# Patient Record
Sex: MALE | Race: WHITE | NOT HISPANIC OR LATINO | URBAN - METROPOLITAN AREA
[De-identification: names, ages, dates, MRNs, and addresses within clinical notes are randomized per-mention and may not be internally consistent; named-entity substitution may affect disease eponyms.]

---

## 2017-09-29 ENCOUNTER — OFFICE (AMBULATORY)
Dept: URBAN - METROPOLITAN AREA CLINIC 64 | Facility: CLINIC | Age: 72
End: 2017-09-29

## 2017-09-29 ENCOUNTER — HOSPITAL ENCOUNTER (OUTPATIENT)
Dept: OTHER | Facility: HOSPITAL | Age: 72
Setting detail: SPECIMEN
Discharge: HOME | End: 2017-09-29
Attending: INTERNAL MEDICINE | Admitting: INTERNAL MEDICINE

## 2017-09-29 ENCOUNTER — ON CAMPUS - OUTPATIENT (AMBULATORY)
Dept: URBAN - METROPOLITAN AREA HOSPITAL 2 | Facility: HOSPITAL | Age: 72
End: 2017-09-29

## 2017-09-29 VITALS
DIASTOLIC BLOOD PRESSURE: 66 MMHG | SYSTOLIC BLOOD PRESSURE: 121 MMHG | RESPIRATION RATE: 18 BRPM | WEIGHT: 212 LBS | RESPIRATION RATE: 16 BRPM | OXYGEN SATURATION: 100 % | DIASTOLIC BLOOD PRESSURE: 85 MMHG | OXYGEN SATURATION: 98 % | HEART RATE: 64 BPM | SYSTOLIC BLOOD PRESSURE: 171 MMHG | SYSTOLIC BLOOD PRESSURE: 143 MMHG | SYSTOLIC BLOOD PRESSURE: 100 MMHG | SYSTOLIC BLOOD PRESSURE: 124 MMHG | OXYGEN SATURATION: 97 % | SYSTOLIC BLOOD PRESSURE: 112 MMHG | HEART RATE: 63 BPM | DIASTOLIC BLOOD PRESSURE: 74 MMHG | HEART RATE: 70 BPM | HEART RATE: 61 BPM | DIASTOLIC BLOOD PRESSURE: 67 MMHG | SYSTOLIC BLOOD PRESSURE: 111 MMHG | HEIGHT: 72 IN | DIASTOLIC BLOOD PRESSURE: 84 MMHG | SYSTOLIC BLOOD PRESSURE: 117 MMHG | OXYGEN SATURATION: 99 % | TEMPERATURE: 97.1 F | DIASTOLIC BLOOD PRESSURE: 62 MMHG | HEART RATE: 60 BPM | SYSTOLIC BLOOD PRESSURE: 158 MMHG | DIASTOLIC BLOOD PRESSURE: 76 MMHG | HEART RATE: 77 BPM

## 2017-09-29 DIAGNOSIS — K64.1 SECOND DEGREE HEMORRHOIDS: ICD-10-CM

## 2017-09-29 DIAGNOSIS — B96.81 HELICOBACTER PYLORI [H. PYLORI] AS THE CAUSE OF DISEASES CLA: ICD-10-CM

## 2017-09-29 DIAGNOSIS — D12.5 BENIGN NEOPLASM OF SIGMOID COLON: ICD-10-CM

## 2017-09-29 DIAGNOSIS — K29.50 UNSPECIFIED CHRONIC GASTRITIS WITHOUT BLEEDING: ICD-10-CM

## 2017-09-29 DIAGNOSIS — K21.9 GASTRO-ESOPHAGEAL REFLUX DISEASE WITHOUT ESOPHAGITIS: ICD-10-CM

## 2017-09-29 DIAGNOSIS — Z12.11 ENCOUNTER FOR SCREENING FOR MALIGNANT NEOPLASM OF COLON: ICD-10-CM

## 2017-09-29 DIAGNOSIS — K31.89 OTHER DISEASES OF STOMACH AND DUODENUM: ICD-10-CM

## 2017-09-29 DIAGNOSIS — K29.70 GASTRITIS, UNSPECIFIED, WITHOUT BLEEDING: ICD-10-CM

## 2017-09-29 LAB
GI HISTOLOGY: A. SELECT: (no result)
GI HISTOLOGY: B. UNSPECIFIED: (no result)
GI HISTOLOGY: PDF REPORT: (no result)

## 2017-09-29 PROCEDURE — 45385 COLONOSCOPY W/LESION REMOVAL: CPT | Mod: PT | Performed by: INTERNAL MEDICINE

## 2017-09-29 PROCEDURE — 43239 EGD BIOPSY SINGLE/MULTIPLE: CPT | Mod: 59 | Performed by: INTERNAL MEDICINE

## 2017-09-29 PROCEDURE — 88342 IMHCHEM/IMCYTCHM 1ST ANTB: CPT | Mod: 26 | Performed by: INTERNAL MEDICINE

## 2017-09-29 PROCEDURE — 88305 TISSUE EXAM BY PATHOLOGIST: CPT | Mod: 26 | Performed by: INTERNAL MEDICINE

## 2017-09-29 RX ORDER — PANTOPRAZOLE SODIUM 40 MG/1
40 TABLET, DELAYED RELEASE ORAL
Qty: 90 | Refills: 3 | Status: ACTIVE
Start: 2017-09-29

## 2017-09-29 RX ADMIN — PROPOFOL: 10 INJECTION, EMULSION INTRAVENOUS at 11:49

## 2018-01-12 ENCOUNTER — HOSPITAL ENCOUNTER (OUTPATIENT)
Dept: FAMILY MEDICINE CLINIC | Facility: CLINIC | Age: 73
Setting detail: SPECIMEN
Discharge: HOME | End: 2018-01-12
Attending: FAMILY MEDICINE | Admitting: FAMILY MEDICINE

## 2018-01-12 LAB
ALBUMIN SERPL-MCNC: 4.2 G/DL (ref 3.5–4.8)
ALBUMIN/GLOB SERPL: 1.7 {RATIO} (ref 1–1.7)
ALP SERPL-CCNC: 48 IU/L (ref 32–91)
ALT SERPL-CCNC: 55 IU/L (ref 17–63)
ANION GAP SERPL CALC-SCNC: 12 MMOL/L (ref 10–20)
AST SERPL-CCNC: 44 IU/L (ref 15–41)
BILIRUB SERPL-MCNC: 1 MG/DL (ref 0.3–1.2)
BUN SERPL-MCNC: 15 MG/DL (ref 8–20)
BUN/CREAT SERPL: 13.6 (ref 6.2–20.3)
CALCIUM SERPL-MCNC: 9.8 MG/DL (ref 8.9–10.3)
CHLORIDE SERPL-SCNC: 103 MMOL/L (ref 101–111)
CHOLEST SERPL-MCNC: 159 MG/DL
CHOLEST/HDLC SERPL: 3.5 {RATIO}
CONV CO2: 29 MMOL/L (ref 22–32)
CONV LDL CHOLESTEROL DIRECT: 103 MG/DL (ref 0–100)
CONV TOTAL PROTEIN: 6.7 G/DL (ref 6.1–7.9)
CREAT UR-MCNC: 1.1 MG/DL (ref 0.7–1.2)
GLOBULIN UR ELPH-MCNC: 2.5 G/DL (ref 2.5–3.8)
GLUCOSE SERPL-MCNC: 134 MG/DL (ref 65–99)
HDLC SERPL-MCNC: 46 MG/DL
LDLC/HDLC SERPL: 2.2 {RATIO}
LIPID INTERPRETATION: ABNORMAL
POTASSIUM SERPL-SCNC: 4 MMOL/L (ref 3.6–5.1)
SODIUM SERPL-SCNC: 140 MMOL/L (ref 136–144)
TRIGL SERPL-MCNC: 105 MG/DL
VLDLC SERPL CALC-MCNC: 10.4 MG/DL

## 2018-07-10 ENCOUNTER — HOSPITAL ENCOUNTER (OUTPATIENT)
Dept: LAB | Facility: HOSPITAL | Age: 73
Setting detail: SPECIMEN
Discharge: HOME | End: 2018-07-10
Attending: INTERNAL MEDICINE | Admitting: INTERNAL MEDICINE

## 2018-07-10 LAB
ALBUMIN SERPL-MCNC: 3.7 G/DL (ref 3.5–4.8)
ALBUMIN/GLOB SERPL: 1.4 {RATIO} (ref 1–1.7)
ALP SERPL-CCNC: 45 IU/L (ref 32–91)
ALT SERPL-CCNC: 28 IU/L (ref 17–63)
ANION GAP SERPL CALC-SCNC: 10.1 MMOL/L (ref 10–20)
AST SERPL-CCNC: 32 IU/L (ref 15–41)
BILIRUB SERPL-MCNC: 0.7 MG/DL (ref 0.3–1.2)
BUN SERPL-MCNC: 10 MG/DL (ref 8–20)
BUN/CREAT SERPL: 10 (ref 6.2–20.3)
CALCIUM SERPL-MCNC: 9.4 MG/DL (ref 8.9–10.3)
CHLORIDE SERPL-SCNC: 107 MMOL/L (ref 101–111)
CHOLEST SERPL-MCNC: 167 MG/DL
CHOLEST/HDLC SERPL: 3.6 {RATIO}
CONV CO2: 27 MMOL/L (ref 22–32)
CONV LDL CHOLESTEROL DIRECT: 100 MG/DL (ref 0–100)
CONV MICROALBUM.,U,RANDOM: 17 MG/L
CONV TOTAL PROTEIN: 6.3 G/DL (ref 6.1–7.9)
CREAT 24H UR-MCNC: 220.6 MG/DL
CREAT UR-MCNC: 1 MG/DL (ref 0.7–1.2)
GLOBULIN UR ELPH-MCNC: 2.6 G/DL (ref 2.5–3.8)
GLUCOSE SERPL-MCNC: 114 MG/DL (ref 65–99)
HDLC SERPL-MCNC: 47 MG/DL
LDLC/HDLC SERPL: 2.1 {RATIO}
LIPID INTERPRETATION: NORMAL
MICROALBUMIN/CREAT UR: 7.7 UG/MG
POTASSIUM SERPL-SCNC: 4.1 MMOL/L (ref 3.6–5.1)
SODIUM SERPL-SCNC: 140 MMOL/L (ref 136–144)
TRIGL SERPL-MCNC: 81 MG/DL
VLDLC SERPL CALC-MCNC: 20.5 MG/DL

## 2018-11-12 ENCOUNTER — HOSPITAL ENCOUNTER (OUTPATIENT)
Dept: LAB | Facility: HOSPITAL | Age: 73
Setting detail: SPECIMEN
Discharge: HOME | End: 2018-11-12
Attending: INTERNAL MEDICINE | Admitting: INTERNAL MEDICINE

## 2018-11-12 LAB
ALBUMIN SERPL-MCNC: 4.1 G/DL (ref 3.5–4.8)
ALBUMIN/GLOB SERPL: 1.7 {RATIO} (ref 1–1.7)
ALP SERPL-CCNC: 60 IU/L (ref 32–91)
ALT SERPL-CCNC: 23 IU/L (ref 17–63)
ANION GAP SERPL CALC-SCNC: 10.4 MMOL/L (ref 10–20)
AST SERPL-CCNC: 26 IU/L (ref 15–41)
BILIRUB SERPL-MCNC: 0.9 MG/DL (ref 0.3–1.2)
BUN SERPL-MCNC: 12 MG/DL (ref 8–20)
BUN/CREAT SERPL: 12 (ref 6.2–20.3)
CALCIUM SERPL-MCNC: 9 MG/DL (ref 8.9–10.3)
CHLORIDE SERPL-SCNC: 103 MMOL/L (ref 101–111)
CHOLEST SERPL-MCNC: 152 MG/DL
CHOLEST/HDLC SERPL: 3.1 {RATIO}
CONV CO2: 28 MMOL/L (ref 22–32)
CONV LDL CHOLESTEROL DIRECT: 98 MG/DL (ref 0–100)
CONV TOTAL PROTEIN: 6.5 G/DL (ref 6.1–7.9)
CREAT UR-MCNC: 1 MG/DL (ref 0.7–1.2)
GLOBULIN UR ELPH-MCNC: 2.4 G/DL (ref 2.5–3.8)
GLUCOSE SERPL-MCNC: 117 MG/DL (ref 65–99)
HDLC SERPL-MCNC: 49 MG/DL
LDLC/HDLC SERPL: 2 {RATIO}
LIPID INTERPRETATION: NORMAL
POTASSIUM SERPL-SCNC: 4.4 MMOL/L (ref 3.6–5.1)
SODIUM SERPL-SCNC: 137 MMOL/L (ref 136–144)
TRIGL SERPL-MCNC: 68 MG/DL
VLDLC SERPL CALC-MCNC: 5.1 MG/DL

## 2019-01-04 ENCOUNTER — HOSPITAL ENCOUNTER (OUTPATIENT)
Dept: LAB | Facility: HOSPITAL | Age: 74
Setting detail: SPECIMEN
Discharge: HOME | End: 2019-01-04
Attending: NURSE PRACTITIONER | Admitting: NURSE PRACTITIONER

## 2019-05-14 ENCOUNTER — HOSPITAL ENCOUNTER (OUTPATIENT)
Dept: LAB | Facility: HOSPITAL | Age: 74
Setting detail: SPECIMEN
Discharge: HOME | End: 2019-05-14
Attending: NURSE PRACTITIONER | Admitting: NURSE PRACTITIONER

## 2019-05-14 LAB
ALBUMIN SERPL-MCNC: 4.2 G/DL (ref 3.5–4.8)
ALBUMIN/GLOB SERPL: 1.6 {RATIO} (ref 1–1.7)
ALP SERPL-CCNC: 49 IU/L (ref 32–91)
ALT SERPL-CCNC: 33 IU/L (ref 17–63)
ANION GAP SERPL CALC-SCNC: 15.2 MMOL/L (ref 10–20)
AST SERPL-CCNC: 27 IU/L (ref 15–41)
BILIRUB SERPL-MCNC: 0.8 MG/DL (ref 0.3–1.2)
BUN SERPL-MCNC: 17 MG/DL (ref 8–20)
BUN/CREAT SERPL: 15.5 (ref 6.2–20.3)
CALCIUM SERPL-MCNC: 9.4 MG/DL (ref 8.9–10.3)
CHLORIDE SERPL-SCNC: 102 MMOL/L (ref 101–111)
CONV CO2: 25 MMOL/L (ref 22–32)
CONV TOTAL PROTEIN: 6.8 G/DL (ref 6.1–7.9)
CREAT UR-MCNC: 1.1 MG/DL (ref 0.7–1.2)
GLOBULIN UR ELPH-MCNC: 2.6 G/DL (ref 2.5–3.8)
GLUCOSE SERPL-MCNC: 105 MG/DL (ref 65–99)
HBA1C MFR BLD: 5.7 % (ref 0–5.6)
POTASSIUM SERPL-SCNC: 4.2 MMOL/L (ref 3.6–5.1)
SODIUM SERPL-SCNC: 138 MMOL/L (ref 136–144)
TSH SERPL-ACNC: 5.84 UIU/ML (ref 0.34–5.6)

## 2019-05-24 ENCOUNTER — CONVERSION ENCOUNTER (OUTPATIENT)
Dept: CARDIOLOGY | Facility: CLINIC | Age: 74
End: 2019-05-24

## 2019-06-04 VITALS
DIASTOLIC BLOOD PRESSURE: 75 MMHG | BODY MASS INDEX: 28.17 KG/M2 | SYSTOLIC BLOOD PRESSURE: 102 MMHG | HEIGHT: 72 IN | WEIGHT: 208 LBS | HEART RATE: 78 BPM | OXYGEN SATURATION: 98 %

## 2019-06-06 NOTE — PROGRESS NOTES
Visit Type:  Follow-up Visit  Referring Provider:  Tammy Rodas MD  Primary Provider:  Adrianna Munoz MD    CC:  DM Type 1  and follow-up of diabetes Type 1.    History of Present Illness:  73-year-old male with history of type 1 diabetes, hypertension, hyperlipidemia is here for follow-up.  He had a high mario 65 antibodies with mid normal C-peptide.  He is currently taking Lantus 5 units subcu daily, metformin 500 mb BID, alogliptan  25 MG   daily, 2 tablets p.o.BID.   He stopped novolog.  He will have low dizziness at 70-80.  if he is active. He checks BS 3 times a day.  Bs are good at home but unfortunately he did not bring in BS. BS in am .   HTN: Well controlled.   HLD: On Simvastatin.    The patient complains of polydipsia and self managed hypoglycemia, but denies polyuria, polyphagia, nocturia, hypoglycemia requiring assistance, nocturnal hypoglycemia, hypoglycemic unawareness, weight loss, weight gain, fatigue and blurred vision.    Associated symptoms noted include nausea and vomiting.  Since the last visit the patient admits to HBGM testing:, dietary compliance is fair/good and complying with medications.  Patient notes eye care since last visit including seen by ophthalmologist.    He only eats aboout twice a day.       Standards of Care   Discussed Driving Precautions: yes  Discussed Carrying Glucose Source: Yes  Discussed Wear DM Alert ID: Yes  Discussed ASA Use: Yes  Last Eye Exam: 2019  Influenza vaccine: 2018  Pneumovax: 2018      Past Medical History:     Reviewed history from 01/08/2019 and no changes required:        Hyperlipidemia        Hypogonadism        Osteoarthritis        Sleep Apnea        Allergic Rhinitis        Elevated liver enzymes        Diabetes II        Sick Sinus Syndrome    Past Surgical History:     Reviewed history from 01/08/2019 and no changes required:        Shoulder Arthroscopy 2001        umbilical Hernia 2005         L-Spine Surgery 1986        Prostate Bx 2003         Carpal Tunnel Release 1997        Cholecystectomy 1994        Pacemaker: 09/10        Meniscal Tears  2004        Cataract Extraction (11/01/2017) both        EGD    Family History Summary:      Reviewed history Last on 02/13/2019 and no changes required:05/27/2019      General Comments - FH:  FH Diabetes  FH Hypertension  FH Stroke  FH Lung Cancer  FH Liver Dz       Social History:     Reviewed history from 02/13/2019 and no changes required:        Patient is a former smoker.        Passive Smoke: N        Alcohol Use: Y        Drug Use: N        HIV/High Risk: N        Regular Exercise: N                        Retired        Children~2        Risk Factors:     Smoked Tobacco Use:  Former smoker     Cigarettes:  Yes        Year quit:  1992        Years Since Last Quit:  27  Smokeless Tobacco Use:  Never  Passive smoke exposure:  no  Drug use:  no  HIV high-risk behavior:  no  Caffeine use:  3 drinks per day  Alcohol use:  yes     Drinks per day:  social     Has patient --        Felt need to cut down:  no        Been annoyed by complaints:  no        Felt guilty about drinking:  no        Needed eye opener in the morning:  no     Counseled to quit/cut down alcohol use:  no  Exercise:  no  Seatbelt use:  100 %  Sun Exposure:  occasionally    Previous Tobacco Use: Signed On - 02/13/2019  Smoked Tobacco Use:  Former smoker     Cigarettes:  Yes        Year quit:  1992        Years Since Last Quit:  27 years, 4 months, 26 days  Smokeless Tobacco Use:  Never     Counseled to quit/cut down:  no  Passive smoke exposure:  no  Drug use:  no  HIV high-risk behavior:  no  Caffeine use:  3 drinks per day    Previous Alcohol Use: Signed On - 02/13/2019  Alcohol use:  yes     Drinks per day:  social     Has patient --        Felt need to cut down:  no        Been annoyed by complaints:  no        Felt guilty about drinking:  no        Needed eye opener in the morning:  no     Counseled to quit/cut down alcohol use:   no  Exercise:  no  Seatbelt use:  100 %  Sun Exposure:  occasionally    Colonoscopy History:     Date of Last Colonoscopy:  09/29/2017    Current Allergies (reviewed today):  No known allergies    Current Medications (including medications started today):   METFORMIN  MG ORAL TABLET (METFORMIN HCL) Take one (1) tablet by mouth twice a day  ALOGLIPTIN BENZOATE 25 MG ORAL TABLET (ALOGLIPTIN BENZOATE) Take 1 tablet by mouth daily  PANTOPRAZOLE SODIUM 40 MG ORAL TABLET DELAYED RELEASE (PANTOPRAZOLE SODIUM) Take 1 tablet by mouth daily  BD PEN NEEDLE SHORT U/F 31G X 8 MM (INSULIN PEN NEEDLE) USE WITH insulin pens 4 times per day. Dx code: E11.65  ACCU-CHEK GUIDE TEST STRIPS 50 (GLUCOSE BLOOD) USE STRIPS TO CHECK BLOOD SUGAR LEVELS 4 TIMES DAILY.  BEFORE A MEAL AND AT BEDTIME. E11.69  ACCU-CHEK FASTCLIX LANCETS (LANCETS) USE TO TEST AC AND QHS UTD  LANTUS SOLOSTAR 100 UNIT/ML SUBCUTANEOUS SOLUTION PEN-INJECTOR (INSULIN GLARGINE) INJECT 5 UNITS SUBCUTANEOUSLY QAM DX E11.69  MONTELUKAST SODIUM 10 MG TABLET (MONTELUKAST SODIUM) TAKE 1 TABLET EVERY DAY  SYMBICORT AEROSOL (BUDESONIDE-FORMOTEROL FUMARATE AERO) prn 2 puffs BID  FLONASE 50 MCG/ACT NASAL SUSPENSION (FLUTICASONE PROPIONATE) PRN  ALFUZOSIN HCL ER 10 MG TABLET EXTENDED RELEASE 24 HOUR (ALFUZOSIN HCL) TAKE 1 TABLET AT BEDTIME  ASPIRIN 81 MG ORAL TABLET (ASPIRIN) Take 1 tablet by mouth daily  MULTI VITAMIN/MINERALS ORAL TABLET (MULTIPLE VITAMINS-MINERALS) Take 1 tablet by mouth daily  ZANTAC 150 MG ORAL TABLET (RANITIDINE HCL) Take 1 tablet by mouth daily  SIMVASTATIN 40 MG TABLET (SIMVASTATIN) TAKE 1 TABLET  EVERY EVENING FOR HIGH CHOLESTEROL.      Review of Systems     General       Denies weight loss and fatigue.    Eyes       Denies double vision and blurring.    CV       Denies chest pain or discomfort and shortness of breath with exertion.    GI       Complains of nausea, vomiting and abdominal pain.       comes and goes.            Denies urinary  frequency and urinary hesitancy.    MS       Denies muscle cramps and muscle aches.    Derm       Complains of dryness.       Denies itching.    Neuro       Denies numbness and tingling.    Psych       Denies anxiety and depression.    Endo       Complains of cold intolerance.       Denies heat intolerance.      Vital Signs:    Patient Profile:    73 Years Old Male  Height:     72 inches  Weight:     208 pounds  BMI:        28.21     O2 Sat:     98 %  Pulse rate: 78 / minute  BP Sittin / 75  (left arm)    Cuff size:  large      Problems: Active problems were reviewed with the patient during this visit.  Medications: Medications were reviewed with the patient during this visit.  Allergies: Allergies were reviewed with the patient during this visit.  No Known Allergy.        Vitals Entered By: Brittni Mcdowell MA (May 24, 2019 10:00 AM)      Physical Exam    General: Well developed, well nourished, NAD  Eyes: Pink conjunctivae, No ptosis.   Neck: No masses, No thyromegaly, trachea midline.  Lungs: CTA with normal respiratory effort  CV: RRR, no murmur rub or gallop.  Musculoskeletal: Normal gait and station. No digital cyanosis.  Extremities: No clubbing, cyanosis, edema, or deformity.   Neurologic: No focal deficits. Cranial nerves intact.  Skin: Warm and dry, No lesions or rashes  Psych: AAOx3 with appropraite affect        Blood Pressure:  Today's BP: 102/75 mm Hg    Labwork:   Most Recent Lab Results:   LDL: 98 mg/dL 2018  HbA1c: : 5.7 % 2019      Impression & Recommendations:    Problem # 1:  Diabetes mellitus, type I, uncontrolled (ICD-250.03) (KJQ18-A04.65)  Well controlled. A1C 5.7.  stop aloglipitan. continue metformin and lantus. Complications of uncontrolled diabetes discussed with patient in detail including micro and macro vascular complications. Advised to check blood sugars as recommended, take   medications as advised, follow life style changes as discussed, send blood sugar records for  review periodically, take baby ASA unless contraindicated, annual eye exam and flu vaccination and pneumonia vaccine if not done in last 5 yrs.    His updated medication list for this problem includes:     Metformin Hcl 500 Mg Oral Tablet (Metformin hcl) ..... Take one (1) tablet by mouth twice a day     Alogliptin Benzoate 25 Mg Oral Tablet (Alogliptin benzoate) ..... Take 1 tablet by mouth daily     Lantus Solostar 100 Unit/ml Subcutaneous Solution Pen-injector (Insulin glargine) ..... Inject 5 units subcutaneously qam dx e11.69    Orders:  Glucose (37423)  Ofc Vst, Est Level IV (50778)    Future Orders:  [facility] MICROALBUMIN/CREATININE RATIO (MACRE) ... 07/01/2019  [facility] LIPID PANEL (LIPID) ... 07/01/2019  [facility] COMPREHENSIVE METABOLIC PANEL (CMP) (MPC) ... 07/01/2019  [facility] T4 THYROXINE FREE (FT4) ... 07/01/2019  [facility] THYROID STIMULATING HORMONE (TSH) (TSH) ... 07/01/2019  [facility] THYROID PEROXIDASE (MICROSOMAL) AB (TPO) ... 07/01/2019  [facility] HEMOGLOBIN A1c (A1DCA) ... 07/01/2019      Problem # 2:  HYPERTENSION, BENIGN (ICD-401.1) (SHA41-E91)  102/75 well controlled CPM.  Orders:  Ofc Vst, Est Level IV (66519)    Future Orders:  [facility] MICROALBUMIN/CREATININE RATIO (MACRE) ... 07/01/2019  [facility] LIPID PANEL (LIPID) ... 07/01/2019  [facility] COMPREHENSIVE METABOLIC PANEL (CMP) (MPC) ... 07/01/2019  [facility] T4 THYROXINE FREE (FT4) ... 07/01/2019  [facility] THYROID STIMULATING HORMONE (TSH) (TSH) ... 07/01/2019  [facility] THYROID PEROXIDASE (MICROSOMAL) AB (TPO) ... 07/01/2019  [facility] HEMOGLOBIN A1c (A1DCA) ... 07/01/2019      Problem # 3:  HYPERLIPIDEMIA (ICD-272.4) (IZO70-R67.5)   CPM.   His updated medication list for this problem includes:     Simvastatin 40 Mg Tablet (Simvastatin) ..... Take 1 tablet  every evening for high cholesterol.  CHOL: 152 (11/12/2018)   LDL: 98 (11/12/2018)   HDL: 49 (11/12/2018)     Orders:  Ofc Vst, Est Level IV (07550)    Future Orders:  [facility] MICROALBUMIN/CREATININE RATIO (MACRE) ... 07/01/2019  [facility] LIPID PANEL (LIPID) ...  07/01/2019  [facility] COMPREHENSIVE METABOLIC PANEL (CMP) (MPC) ... 07/01/2019  [facility] T4 THYROXINE FREE (FT4) ... 07/01/2019  [facility] THYROID STIMULATING HORMONE (TSH) (TSH) ... 07/01/2019  [facility] THYROID PEROXIDASE (MICROSOMAL) AB (TPO) ... 07/01/2019  [facility] HEMOGLOBIN A1c (A1DCA) ... 07/01/2019      Problem # 4:  Abnormal Thyroid Test (ICD-796.4) (KVN58-A81.89)  TSH 5.84. WIll follow.   Orders:  Ofc Vst, Est Level IV (16914)    Future Orders:  [facility] MICROALBUMIN/CREATININE RATIO (MACRE) ... 07/01/2019  [facility] LIPID PANEL (LIPID) ... 07/01/2019  [facility] COMPREHENSIVE METABOLIC PANEL (CMP) (MPC) ... 07/01/2019  [facility] T4 THYROXINE FREE (FT4) ... 07/01/2019  [facility] THYROID STIMULATING HORMONE (TSH) (TSH) ... 07/01/2019  [facility] THYROID PEROXIDASE (MICROSOMAL) AB (TPO) ... 07/01/2019  [facility] HEMOGLOBIN A1c (A1DCA) ... 07/01/2019      Medications Added to Medication List This Visit:  1)  Alogliptin Benzoate 25 Mg Oral Tablet (Alogliptin benzoate) .... Take 1 tablet by mouth daily  2)  Lantus Solostar 100 Unit/ml Subcutaneous Solution Pen-injector (Insulin glargine) .... Inject 5 units subcutaneously qam dx e11.69      Patient Instructions:  1)  Please schedule a follow-up appointment in 3 months.  2)  Be sure to return for lab work one (1) week before your next appointment as scheduled.                    Medication Administration    Orders Added:  1)  Glucose [22308]  2)  [facility] MICROALBUMIN/CREATININE RATIO [MACRE]  3)  [facility] LIPID PANEL [LIPID]  4)  [facility] COMPREHENSIVE METABOLIC PANEL (CMP) [MPC]  5)  [facility] T4 THYROXINE FREE [FT4]  6)  [facility] THYROID STIMULATING HORMONE (TSH) [TSH]  7)  [facility] THYROID PEROXIDASE (MICROSOMAL) AB [TPO]  8)  [facility] HEMOGLOBIN A1c [A1DCA]  9)  Ofc Vst, Est Level IV [[id number]]  ]  Technician: Francoise Mcdowell MA         Date/Time Collected: May 24, 2019 9:53 AM)  Date/Time Received: May 24, 2019 9:53 AM)  Performed by: francoise mcdowell     Glucose (rdm):  150 mg/dL        mg/dL (normal range)                Electronically signed by Sania Mays MD  on 05/27/2019 at 10:33 AM  ________________________________________________________________________       Disclaimer: Converted Note message may not contain all data elements that existed in the legacy source system. Please see [employer] System for the original note details.

## 2019-06-07 NOTE — PROCEDURES
Device report      Imported By: Mary Seipel 5/31/2019 3:44:30 PM    _____________________________________________________________________    External Attachment:      Type: Image      Comment:  External Document      Signed before import by Rodger Limon MD  Filed automatically on 05/31/2019 at 3:45 PM  ________________________________________________________________________       Disclaimer: Converted Note message may not contain all data elements that existed in the legacy source system. Please see [facility] Legacy System for the original note details.

## 2019-08-14 PROBLEM — R68.89 OTHER GENERAL SYMPTOMS AND SIGNS: Status: ACTIVE | Noted: 2018-11-19

## 2019-08-14 PROBLEM — G47.30 SLEEP APNEA: Status: ACTIVE | Noted: 2019-08-14

## 2019-08-14 PROBLEM — IMO0002 HYPOGONADISM: Status: ACTIVE | Noted: 2019-08-14

## 2019-08-14 PROBLEM — E11.10 TYPE 2 DIABETES MELLITUS WITH KETOACIDOSIS (HCC): Status: ACTIVE | Noted: 2018-06-26

## 2019-08-14 PROBLEM — E10.65 TYPE 1 DIABETES MELLITUS WITH HYPERGLYCEMIA: Status: ACTIVE | Noted: 2018-06-26

## 2019-08-14 PROBLEM — N39.0 URINARY TRACT INFECTION: Status: ACTIVE | Noted: 2019-01-08

## 2019-08-14 PROBLEM — E78.5 HYPERLIPIDEMIA: Status: ACTIVE | Noted: 2019-08-14

## 2019-08-14 PROBLEM — M19.90 OSTEOARTHRITIS: Status: ACTIVE | Noted: 2019-08-14

## 2019-08-14 PROBLEM — J45.909 REACTIVE AIRWAY DISEASE: Status: ACTIVE | Noted: 2018-04-16

## 2019-08-14 RX ORDER — FLUTICASONE PROPIONATE 50 MCG
SPRAY, SUSPENSION (ML) NASAL AS NEEDED
COMMUNITY
Start: 2018-03-05

## 2019-08-14 RX ORDER — SIMVASTATIN 40 MG
TABLET ORAL
COMMUNITY
Start: 2018-09-06 | End: 2019-08-23

## 2019-08-14 RX ORDER — PANTOPRAZOLE SODIUM 40 MG/1
TABLET, DELAYED RELEASE ORAL EVERY 24 HOURS
COMMUNITY
Start: 2018-10-23 | End: 2019-10-04 | Stop reason: SDUPTHER

## 2019-08-14 RX ORDER — ALFUZOSIN HYDROCHLORIDE 10 MG/1
TABLET, EXTENDED RELEASE ORAL
COMMUNITY
Start: 2019-05-13 | End: 2019-10-04 | Stop reason: SDUPTHER

## 2019-08-14 RX ORDER — RANITIDINE 150 MG/1
TABLET ORAL EVERY 24 HOURS
COMMUNITY
Start: 2017-09-29 | End: 2019-10-04 | Stop reason: ALTCHOICE

## 2019-08-14 RX ORDER — MONTELUKAST SODIUM 10 MG/1
TABLET ORAL
COMMUNITY
Start: 2019-05-07 | End: 2019-10-04 | Stop reason: SDUPTHER

## 2019-08-14 RX ORDER — ALOGLIPTIN 25 MG/1
TABLET, FILM COATED ORAL EVERY 24 HOURS
COMMUNITY
Start: 2019-02-13 | End: 2019-08-23 | Stop reason: ALTCHOICE

## 2019-08-14 RX ORDER — LANCETS
EACH MISCELLANEOUS
COMMUNITY
Start: 2018-06-28

## 2019-08-15 DIAGNOSIS — E10.65 TYPE 1 DIABETES MELLITUS WITH HYPERGLYCEMIA (HCC): ICD-10-CM

## 2019-08-15 DIAGNOSIS — I10 HYPERTENSION, BENIGN: ICD-10-CM

## 2019-08-15 DIAGNOSIS — R79.89 ABNORMAL THYROID BLOOD TEST: ICD-10-CM

## 2019-08-15 DIAGNOSIS — E78.5 HYPERLIPIDEMIA, UNSPECIFIED HYPERLIPIDEMIA TYPE: Primary | ICD-10-CM

## 2019-08-16 ENCOUNTER — LAB (OUTPATIENT)
Dept: LAB | Facility: HOSPITAL | Age: 74
End: 2019-08-16

## 2019-08-16 DIAGNOSIS — R79.89 ABNORMAL THYROID BLOOD TEST: ICD-10-CM

## 2019-08-16 DIAGNOSIS — I10 HYPERTENSION, BENIGN: ICD-10-CM

## 2019-08-16 DIAGNOSIS — E10.65 TYPE 1 DIABETES MELLITUS WITH HYPERGLYCEMIA (HCC): ICD-10-CM

## 2019-08-16 DIAGNOSIS — E78.5 HYPERLIPIDEMIA, UNSPECIFIED HYPERLIPIDEMIA TYPE: ICD-10-CM

## 2019-08-16 LAB
ALBUMIN SERPL-MCNC: 4.1 G/DL (ref 3.5–4.8)
ALBUMIN UR-MCNC: 15 MG/L
ALBUMIN/GLOB SERPL: 1.9 G/DL (ref 1–1.7)
ALP SERPL-CCNC: 51 U/L (ref 32–91)
ALT SERPL W P-5'-P-CCNC: 60 U/L (ref 17–63)
ANION GAP SERPL CALCULATED.3IONS-SCNC: 13.2 MMOL/L (ref 5–15)
ARTICHOKE IGE QN: 121 MG/DL (ref 0–100)
AST SERPL-CCNC: 48 U/L (ref 15–41)
BILIRUB SERPL-MCNC: 0.9 MG/DL (ref 0.3–1.2)
BUN BLD-MCNC: 19 MG/DL (ref 8–20)
BUN/CREAT SERPL: 15.8 (ref 6.2–20.3)
CALCIUM SPEC-SCNC: 9.2 MG/DL (ref 8.9–10.3)
CHLORIDE SERPL-SCNC: 103 MMOL/L (ref 101–111)
CHOLEST SERPL-MCNC: 173 MG/DL
CO2 SERPL-SCNC: 26 MMOL/L (ref 22–32)
CREAT BLD-MCNC: 1.2 MG/DL (ref 0.7–1.2)
CREAT UR-MCNC: 191.1 MG/DL
GFR SERPL CREATININE-BSD FRML MDRD: 60 ML/MIN/1.73
GLOBULIN UR ELPH-MCNC: 2.2 GM/DL (ref 2.5–3.8)
GLUCOSE BLD-MCNC: 128 MG/DL (ref 65–99)
HBA1C MFR BLD: 6.2 % (ref 3.5–5.6)
HDLC SERPL QL: 3.93
HDLC SERPL-MCNC: 44 MG/DL
LDLC/HDLC SERPL: 2.42 {RATIO}
MICROALBUMIN/CREAT UR: 7.8 UG/MG
POTASSIUM BLD-SCNC: 4.2 MMOL/L (ref 3.6–5.1)
PROT SERPL-MCNC: 6.3 G/DL (ref 6.1–7.9)
SODIUM BLD-SCNC: 138 MMOL/L (ref 136–144)
T4 FREE SERPL-MCNC: 0.66 NG/DL (ref 0.58–1.64)
THYROID PEROXIDASE: <1 IU/ML (ref 0–9)
TRIGL SERPL-MCNC: 113 MG/DL
TSH SERPL DL<=0.05 MIU/L-ACNC: 5.51 MIU/ML (ref 0.34–5.6)
VLDLC SERPL-MCNC: 22.6 MG/DL

## 2019-08-16 PROCEDURE — 80061 LIPID PANEL: CPT | Performed by: INTERNAL MEDICINE

## 2019-08-16 PROCEDURE — 86376 MICROSOMAL ANTIBODY EACH: CPT | Performed by: INTERNAL MEDICINE

## 2019-08-16 PROCEDURE — 82043 UR ALBUMIN QUANTITATIVE: CPT | Performed by: INTERNAL MEDICINE

## 2019-08-16 PROCEDURE — 36415 COLL VENOUS BLD VENIPUNCTURE: CPT

## 2019-08-16 PROCEDURE — 84443 ASSAY THYROID STIM HORMONE: CPT | Performed by: INTERNAL MEDICINE

## 2019-08-16 PROCEDURE — 82570 ASSAY OF URINE CREATININE: CPT | Performed by: INTERNAL MEDICINE

## 2019-08-16 PROCEDURE — 83036 HEMOGLOBIN GLYCOSYLATED A1C: CPT | Performed by: INTERNAL MEDICINE

## 2019-08-16 PROCEDURE — 84439 ASSAY OF FREE THYROXINE: CPT | Performed by: INTERNAL MEDICINE

## 2019-08-16 PROCEDURE — 80053 COMPREHEN METABOLIC PANEL: CPT | Performed by: INTERNAL MEDICINE

## 2019-08-22 ENCOUNTER — CLINICAL SUPPORT NO REQUIREMENTS (OUTPATIENT)
Dept: CARDIOLOGY | Facility: CLINIC | Age: 74
End: 2019-08-22

## 2019-08-22 DIAGNOSIS — Z95.0 PACEMAKER: ICD-10-CM

## 2019-08-22 DIAGNOSIS — I49.5 SICK SINUS SYNDROME (HCC): Primary | ICD-10-CM

## 2019-08-22 PROCEDURE — 93296 REM INTERROG EVL PM/IDS: CPT | Performed by: INTERNAL MEDICINE

## 2019-08-22 PROCEDURE — 93294 REM INTERROG EVL PM/LDLS PM: CPT | Performed by: INTERNAL MEDICINE

## 2019-08-23 ENCOUNTER — OFFICE VISIT (OUTPATIENT)
Dept: ENDOCRINOLOGY | Facility: CLINIC | Age: 74
End: 2019-08-23

## 2019-08-23 VITALS
HEIGHT: 72 IN | DIASTOLIC BLOOD PRESSURE: 82 MMHG | OXYGEN SATURATION: 98 % | HEART RATE: 84 BPM | WEIGHT: 215 LBS | BODY MASS INDEX: 29.12 KG/M2 | SYSTOLIC BLOOD PRESSURE: 135 MMHG

## 2019-08-23 DIAGNOSIS — I10 HYPERTENSION, BENIGN: ICD-10-CM

## 2019-08-23 DIAGNOSIS — E10.65 TYPE 1 DIABETES MELLITUS WITH HYPERGLYCEMIA (HCC): Primary | ICD-10-CM

## 2019-08-23 DIAGNOSIS — R94.6 ABNORMAL THYROID FUNCTION TEST: ICD-10-CM

## 2019-08-23 DIAGNOSIS — E78.5 HYPERLIPIDEMIA, UNSPECIFIED HYPERLIPIDEMIA TYPE: ICD-10-CM

## 2019-08-23 PROCEDURE — 99214 OFFICE O/P EST MOD 30 MIN: CPT | Performed by: INTERNAL MEDICINE

## 2019-08-23 RX ORDER — LANOLIN ALCOHOL/MO/W.PET/CERES
1000 CREAM (GRAM) TOPICAL DAILY
COMMUNITY

## 2019-08-23 RX ORDER — CETIRIZINE HYDROCHLORIDE 10 MG/1
TABLET ORAL
COMMUNITY
Start: 2019-06-07

## 2019-08-23 RX ORDER — ATORVASTATIN CALCIUM 40 MG/1
40 TABLET, FILM COATED ORAL DAILY
Qty: 90 TABLET | Refills: 3 | Status: SHIPPED | OUTPATIENT
Start: 2019-08-23 | End: 2019-10-04 | Stop reason: SDUPTHER

## 2019-08-23 RX ORDER — MV-MIN/FOLIC/VIT K/LYCOP/COQ10 200-100MCG
CAPSULE ORAL
COMMUNITY
Start: 2019-06-10 | End: 2019-08-23 | Stop reason: ALTCHOICE

## 2019-08-23 NOTE — PROGRESS NOTES
Endocrine Progress Note Outpatient     Patient Care Team:  Tammy Rodas MD as PCP - General (Family Medicine)  Provider, No Known as PCP - Family Medicine    Chief Complaint: Follow-up type 1 diabetes    HPI: 73-year-old male with history of type 1 diabetes, hypertension, hyperlipidemia is here for follow-up.  He has high mario 65 antibodies been mid normal C-peptide therefore he is a still on metformin 500 twice a day along with Lantus units subcu daily.  He forgot to bring blood sugar records for review, he checks his blood sugars twice a day and runs between 110-1 20 most of the time.  He does not have low blood sugars of less than 70.    Hypertension: Well-controlled  Hyperlipidemia: On simvastatin.    Does have high normal TSH however he denies any dry skin, hair loss, constipation.  He tells me that if he is sitting he might doze off, eyes fatigue and tiredness.  He is not on any thyroid medications at this time.    Past Medical History:   Diagnosis Date   • Diabetes mellitus type I (CMS/[facility])    • Hyperlipidemia    • Hypertension        Social History     Socioeconomic History   • Marital status:      Spouse name: Not on file   • Number of children: Not on file   • Years of education: Not on file   • Highest education level: Not on file   Tobacco Use   • Smoking status: Former Smoker   Substance and Sexual Activity   • Alcohol use: Yes       Family History   Problem Relation Age of Onset   • Stroke Mother    • Heart attack Sister    • Cancer Sister         colon   • Cancer Brother         lung   • Alcohol abuse Brother        No Known Allergies    ROS:   Constitutional:  Denies fatigue, tiredness.    Eyes:  Denies change in visual acuity   HENT:  Denies nasal congestion or sore throat   Respiratory: denies cough, shortness of breath.   Cardiovascular:  denies chest pain, edema   GI:  Denies abdominal pain, nausea, vomiting.   Musculoskeletal:  Denies back pain or joint pain   Integument:  Denies dry  skin and rash   Neurologic:  Denies headache, focal weakness or sensory changes   Endocrine:  Denies polyuria or polydipsia   Psychiatric:  Denies depression or anxiety      Vitals:    08/23/19 0956   BP: 135/82   Pulse: 84   SpO2: 98%       Physical Exam:  GEN: NAD, conversant  EYES: EOMI, PERRL, no conjunctival erythema  NECK: no thyromegaly, full ROM   CV: RRR, no murmurs/rubs/gallops, no peripheral edema  LUNG: CTAB, no wheezes/rales/ronchi  SKIN: no rashes, no acanthosis  MSK: no deformities, full ROM of all extremities  NEURO: no tremors, DTR normal  PSYCH: AOX3, appropriate mood, affect normal      Results Review:     I reviewed the patient's new clinical results.    Lab Results   Component Value Date    HGBA1C 6.2 (H) 08/16/2019    HGBA1C 5.7 (H) 05/14/2019      Lab Results   Component Value Date    GLUCOSE 128 (H) 08/16/2019    BUN 19 08/16/2019    CREATININE 1.20 08/16/2019    EGFRIFNONA 60 (L) 08/16/2019    BCR 15.8 08/16/2019    K 4.2 08/16/2019    CO2 26.0 08/16/2019    CALCIUM 9.2 08/16/2019    ALBUMIN 4.10 08/16/2019    LABIL2 1.6 05/14/2019    AST 48 (H) 08/16/2019    ALT 60 08/16/2019    CHOL 173 08/16/2019    TRIG 113 08/16/2019     (H) 08/16/2019    HDL 44 08/16/2019     Lab Results   Component Value Date    TSH 5.510 08/16/2019    FREET4 0.66 08/16/2019         Medication Review: Reviewed.       Current Outpatient Medications:   •  ACCU-CHEK FASTCLIX LANCETS misc, ACCU-CHEK FASTCLIX LANCETS, Disp: , Rfl:   •  alfuzosin (UROXATRAL) 10 MG 24 hr tablet, , Disp: , Rfl:   •  aspirin 81 MG tablet, ASPIRIN 81 MG ORAL TABLET, Disp: , Rfl:   •  cetirizine (zyrTEC) 10 MG tablet, , Disp: , Rfl:   •  fluticasone (FLONASE) 50 MCG/ACT nasal spray, FLONASE 50 MCG/ACT NASAL SUSPENSION, Disp: , Rfl:   •  glucose blood (ACCU-CHEK GUIDE) test strip, ACCU-CHEK GUIDE STRP, Disp: , Rfl:   •  Insulin Glargine (LANTUS SOLOSTAR) 100 UNIT/ML injection pen, LANTUS SOLOSTAR 100 UNIT/ML SOPN, Disp: , Rfl:   •  Insulin  "Pen Needle (B-D ULTRAFINE III SHORT PEN) 31G X 8 MM misc, BD PEN NEEDLE SHORT U/F 31G X 8 MM, Disp: , Rfl:   •  metFORMIN (GLUCOPHAGE) 500 MG tablet, Every 12 (Twelve) Hours., Disp: , Rfl:   •  montelukast (SINGULAIR) 10 MG tablet, , Disp: , Rfl:   •  Multiple Vitamins-Minerals (MULTI VITAMIN/MINERALS) tablet, MULTI VITAMIN/MINERALS TABS, Disp: , Rfl:   •  pantoprazole (PROTONIX) 40 MG EC tablet, Daily., Disp: , Rfl:   •  raNITIdine (ZANTAC) 150 MG tablet, Daily., Disp: , Rfl:   •  simvastatin (ZOCOR) 40 MG tablet, SIMVASTATIN 40 MG TABS, Disp: , Rfl:   •  vitamin B-12 (CYANOCOBALAMIN) 1000 MCG tablet, Take 1,000 mcg by mouth Daily., Disp: , Rfl:       Assessment/Plan   1. Diabetes mellitus type 1: Excellent control, continue current medications.  2. Hypertension: He is currently not on any blood pressure medications.  Advised to watch blood pressure at home and decrease salt intake  3. Hyperlipidemia: His LDL is high at 121.  Will DC simvastatin and try atorvastatin 40 mg p.o. daily and follow lipid panel.  4.  Abnormal mild high TSH, TPO antibodies are negative and he is relatively asymptomatic.  I will follow TSH and free T4.            Sania Mays MD FACE.  06/15/19  4:34 PM      EMR Dragon / transcription disclaimer:     \"Dictated utilizing Dragon dictation\".                 "

## 2019-09-12 ENCOUNTER — TELEPHONE (OUTPATIENT)
Dept: CARDIOLOGY | Facility: CLINIC | Age: 74
End: 2019-09-12

## 2019-09-12 NOTE — TELEPHONE ENCOUNTER
Patient calling he telephoned in for his device last month and has not heard anything back. He can be reached at [phone] thanks

## 2019-10-04 ENCOUNTER — OFFICE VISIT (OUTPATIENT)
Dept: FAMILY MEDICINE CLINIC | Facility: CLINIC | Age: 74
End: 2019-10-04

## 2019-10-04 VITALS
WEIGHT: 218 LBS | DIASTOLIC BLOOD PRESSURE: 76 MMHG | HEART RATE: 67 BPM | OXYGEN SATURATION: 98 % | BODY MASS INDEX: 30.52 KG/M2 | HEIGHT: 71 IN | SYSTOLIC BLOOD PRESSURE: 126 MMHG | TEMPERATURE: 97.9 F

## 2019-10-04 DIAGNOSIS — Z00.00 ENCOUNTER FOR MEDICARE ANNUAL WELLNESS EXAM: Primary | ICD-10-CM

## 2019-10-04 DIAGNOSIS — K21.9 GASTROESOPHAGEAL REFLUX DISEASE WITHOUT ESOPHAGITIS: ICD-10-CM

## 2019-10-04 DIAGNOSIS — N40.1 BENIGN PROSTATIC HYPERPLASIA WITH URINARY OBSTRUCTION: ICD-10-CM

## 2019-10-04 DIAGNOSIS — N13.8 BENIGN PROSTATIC HYPERPLASIA WITH URINARY OBSTRUCTION: ICD-10-CM

## 2019-10-04 DIAGNOSIS — E78.5 HYPERLIPIDEMIA, UNSPECIFIED HYPERLIPIDEMIA TYPE: ICD-10-CM

## 2019-10-04 PROCEDURE — 96160 PT-FOCUSED HLTH RISK ASSMT: CPT | Performed by: FAMILY MEDICINE

## 2019-10-04 PROCEDURE — G0439 PPPS, SUBSEQ VISIT: HCPCS | Performed by: FAMILY MEDICINE

## 2019-10-04 RX ORDER — ALFUZOSIN HYDROCHLORIDE 10 MG/1
10 TABLET, EXTENDED RELEASE ORAL DAILY
Qty: 90 TABLET | Refills: 3 | Status: SHIPPED | OUTPATIENT
Start: 2019-10-04 | End: 2020-08-21

## 2019-10-04 RX ORDER — PANTOPRAZOLE SODIUM 40 MG/1
40 TABLET, DELAYED RELEASE ORAL DAILY
Qty: 90 TABLET | Refills: 3 | Status: SHIPPED | OUTPATIENT
Start: 2019-10-04 | End: 2020-08-21

## 2019-10-04 RX ORDER — MONTELUKAST SODIUM 10 MG/1
10 TABLET ORAL NIGHTLY
Qty: 90 TABLET | Refills: 3 | Status: SHIPPED | OUTPATIENT
Start: 2019-10-04 | End: 2020-05-07

## 2019-10-04 RX ORDER — ATORVASTATIN CALCIUM 40 MG/1
40 TABLET, FILM COATED ORAL DAILY
Qty: 90 TABLET | Refills: 3 | Status: SHIPPED | OUTPATIENT
Start: 2019-10-04 | End: 2020-08-21

## 2019-10-04 NOTE — PROGRESS NOTES
Medicare Wellness Visit   The ABC's of the Annual Wellness Visit    Chief Complaint   Patient presents with   • Medicare Wellness-subsequent       HPI:  Floyd Herrera, -1945, is a 74 y.o. male who presents for a Medicare Wellness Visit.    Recent Hospitalizations:  No hospitalization(s) within the last year..    Current Medical Providers:  Patient Care Team:  Tammy Rodas MD as PCP - General (Family Medicine)    Health Habits and Functional and Cognitive Screening and Depression Screening:  Functional & Cognitive Status 10/4/2019   Do you have difficulty preparing food and eating? No   Do you have difficulty bathing yourself, getting dressed or grooming yourself? No   Do you have difficulty using the toilet? No   Do you have difficulty moving around from place to place? No   Do you have trouble with steps or getting out of a bed or a chair? No   Current Diet Well Balanced Diet   Dental Exam Unknown   Eye Exam Unknown   Exercise (times per week) 2 times per week   Current Exercise Activities Include Yard Work   Do you need help using the phone?  No   Are you deaf or do you have serious difficulty hearing?  No   Do you need help with transportation? No   Do you need help shopping? No   Do you need help preparing meals?  No   Do you need help with housework?  No   Do you need help with laundry? No   Do you need help taking your medications? No   Do you need help managing money? No   Do you ever drive or ride in a car without wearing a seat belt? No   Have you felt unusual stress, anger or loneliness in the last month? No   Who do you live with? Spouse   If you need help, do you have trouble finding someone available to you? No   Have you been bothered in the last four weeks by sexual problems? No   Do you have difficulty concentrating, remembering or making decisions? No       Compared to one year ago, the patient feels his physical health is the same and his mental health is the same.    Depression  Screen:  PHQ-2/PHQ-9 Depression Screening 10/4/2019   Little interest or pleasure in doing things 0   Feeling down, depressed, or hopeless 0   Trouble falling or staying asleep, or sleeping too much 0   Feeling tired or having little energy 0   Poor appetite or overeating 0   Feeling bad about yourself - or that you are a failure or have let yourself or your family down 0   Trouble concentrating on things, such as reading the newspaper or watching television 0   Moving or speaking so slowly that other people could have noticed. Or the opposite - being so fidgety or restless that you have been moving around a lot more than usual 0   Thoughts that you would be better off dead, or of hurting yourself in some way 0   Total Score 0   If you checked off any problems, how difficult have these problems made it for you to do your work, take care of things at home, or get along with other people? Not difficult at all         Past Medical/Family/Social History:  The following portions of the patient's history were reviewed and updated as appropriate: allergies, current medications, past family history, past medical history, past social history, past surgical history and problem list.    No Known Allergies      Current Outpatient Medications:   •  ACCU-CHEK FASTCLIX LANCETS misc, ACCU-CHEK FASTCLIX LANCETS, Disp: , Rfl:   •  alfuzosin (UROXATRAL) 10 MG 24 hr tablet, Take 1 tablet by mouth Daily., Disp: 90 tablet, Rfl: 3  •  aspirin 81 MG tablet, ASPIRIN 81 MG ORAL TABLET, Disp: , Rfl:   •  atorvastatin (LIPITOR) 40 MG tablet, Take 1 tablet by mouth Daily., Disp: 90 tablet, Rfl: 3  •  cetirizine (zyrTEC) 10 MG tablet, , Disp: , Rfl:   •  fluticasone (FLONASE) 50 MCG/ACT nasal spray, FLONASE 50 MCG/ACT NASAL SUSPENSION, Disp: , Rfl:   •  glucose blood (ACCU-CHEK GUIDE) test strip, ACCU-CHEK GUIDE STRP, Disp: , Rfl:   •  Insulin Glargine (LANTUS SOLOSTAR) 100 UNIT/ML injection pen, LANTUS SOLOSTAR 100 UNIT/ML SOPN, Disp: , Rfl:    •  Insulin Pen Needle (B-D ULTRAFINE III SHORT PEN) 31G X 8 MM misc, BD PEN NEEDLE SHORT U/F 31G X 8 MM, Disp: , Rfl:   •  metFORMIN (GLUCOPHAGE) 500 MG tablet, Every 12 (Twelve) Hours., Disp: , Rfl:   •  montelukast (SINGULAIR) 10 MG tablet, Take 1 tablet by mouth Every Night., Disp: 90 tablet, Rfl: 3  •  Multiple Vitamins-Minerals (MULTI VITAMIN/MINERALS) tablet, MULTI VITAMIN/MINERALS TABS, Disp: , Rfl:   •  pantoprazole (PROTONIX) 40 MG EC tablet, Take 1 tablet by mouth Daily., Disp: 90 tablet, Rfl: 3  •  vitamin B-12 (CYANOCOBALAMIN) 1000 MCG tablet, Take 1,000 mcg by mouth Daily., Disp: , Rfl:     Aspirin use counseling: Taking ASA appropriately as indicated    Current medication list contains no high risk medications.  No harmful drug interactions have been identified.     Family History   Problem Relation Age of Onset   • Stroke Mother    • Heart attack Sister    • Cancer Sister         colon   • Cancer Brother         lung   • Alcohol abuse Brother        Social History     Tobacco Use   • Smoking status: Former Smoker   Substance Use Topics   • Alcohol use: Yes       Past Surgical History:   Procedure Laterality Date   • BACK SURGERY     • CARPAL TUNNEL RELEASE     • CATARACT EXTRACTION     • GALLBLADDER SURGERY     • HERNIA REPAIR     • KNEE SURGERY         Patient Active Problem List   Diagnosis   • Acquired hallux rigidus   • Benign prostatic hyperplasia with urinary obstruction   • Encounter for immunization   • Encounter for general adult medical examination without abnormal findings   • Hyperlipidemia   • Hypertension, benign   • Hypogonadism   • Impaired fasting glucose   • Need for prophylactic vaccination with combined diphtheria-tetanus-pertussis (DTP) vaccine   • Osteoarthritis   • Other general symptoms and signs   • Presence of cardiac pacemaker   • Sick sinus syndrome (CMS/HCC)   • Reactive airway disease   • Sleep apnea   • Type 1 diabetes mellitus with hyperglycemia (CMS/HCC)   • Abnormal  "thyroid function test   • Gastroesophageal reflux disease without esophagitis       Review of Systems   Constitutional: Negative for chills and fever.   HENT: Negative for sinus pressure and sore throat.    Eyes: Negative for blurred vision.   Respiratory: Negative for cough and shortness of breath.    Cardiovascular: Negative for chest pain and palpitations.   Gastrointestinal: Negative for abdominal pain.   Endocrine: Negative for polyuria.   Skin: Negative for rash.   Neurological: Negative for dizziness and headache.   Hematological: Negative for adenopathy.   Psychiatric/Behavioral: Negative for depressed mood.       Objective     Vitals:    10/04/19 1021   BP: 126/76   BP Location: Right arm   Patient Position: Sitting   Cuff Size: Adult   Pulse: 67   Temp: 97.9 °F (36.6 °C)   TempSrc: Oral   SpO2: 98%   Weight: 98.9 kg (218 lb)   Height: 179.7 cm (70.75\")       Patient's Body mass index is 30.62 kg/m². BMI is above normal parameters. Recommendations include: exercise counseling.      No exam data present    The patient has no evidence of cognitve impairment.     Physical Exam   Constitutional: He is oriented to person, place, and time. He appears well-developed. No distress.   HENT:   Head: Normocephalic.   Eyes: Conjunctivae and lids are normal.   Neck: Trachea normal. No thyroid mass and no thyromegaly present.   Cardiovascular: Normal rate, regular rhythm and normal heart sounds.   Pulmonary/Chest: Effort normal and breath sounds normal.   Lymphadenopathy:     He has no cervical adenopathy.   Neurological: He is alert and oriented to person, place, and time.   Skin: Skin is warm and dry.   Psychiatric: He has a normal mood and affect. His speech is normal and behavior is normal. He is attentive.       Recent Lab Results:     Lab Results   Component Value Date    CHOL 173 08/16/2019    TRIG 113 08/16/2019    HDL 44 08/16/2019    VLDL 22.6 08/16/2019    LDLHDL 2.42 08/16/2019     No visits with results " within 1 Week(s) from this visit.   Latest known visit with results is:   Lab on 08/16/2019   Component Date Value Ref Range Status   • Hemoglobin A1C 08/16/2019 6.2* 3.5 - 5.6 % Final   • Glucose 08/16/2019 128* 65 - 99 mg/dL Final   • BUN 08/16/2019 19  8 - 20 mg/dL Final   • Creatinine 08/16/2019 1.20  0.70 - 1.20 mg/dL Final   • Sodium 08/16/2019 138  136 - 144 mmol/L Final   • Potassium 08/16/2019 4.2  3.6 - 5.1 mmol/L Final   • Chloride 08/16/2019 103  101 - 111 mmol/L Final   • CO2 08/16/2019 26.0  22.0 - 32.0 mmol/L Final   • Calcium 08/16/2019 9.2  8.9 - 10.3 mg/dL Final   • Total Protein 08/16/2019 6.3  6.1 - 7.9 g/dL Final   • Albumin 08/16/2019 4.10  3.50 - 4.80 g/dL Final   • ALT (SGPT) 08/16/2019 60  17 - 63 U/L Final   • AST (SGOT) 08/16/2019 48* 15 - 41 U/L Final   • Alkaline Phosphatase 08/16/2019 51  32 - 91 U/L Final   • Total Bilirubin 08/16/2019 0.9  0.3 - 1.2 mg/dL Final   • eGFR Non African Amer 08/16/2019 60* >60 mL/min/1.73 Final   • Globulin 08/16/2019 2.2* 2.5 - 3.8 gm/dL Final   • A/G Ratio 08/16/2019 1.9* 1.0 - 1.7 g/dL Final   • BUN/Creatinine Ratio 08/16/2019 15.8  6.2 - 20.3 Final   • Anion Gap 08/16/2019 13.2  5.0 - 15.0 mmol/L Final   • Total Cholesterol 08/16/2019 173  <=200 mg/dL Final   • Triglycerides 08/16/2019 113  <=150 mg/dL Final   • HDL Cholesterol 08/16/2019 44  >=39 mg/dL Final   • LDL Cholesterol  08/16/2019 121* 0 - 100 mg/dL Final   • VLDL Cholesterol 08/16/2019 22.6  mg/dL Final   • LDL/HDL Ratio 08/16/2019 2.42   Final   • Chol/HDL Ratio 08/16/2019 3.93   Final   • TSH 08/16/2019 5.510  0.340 - 5.600 mIU/mL Final    Results may be falsely decreased if patient taking Biotin.   • Free T4 08/16/2019 0.66  0.58 - 1.64 ng/dL Final    Results may be falsely increased if patient taking Biotin.   • Creatinine, Urine 08/16/2019 191.1  mg/dL Final   • Microalbumin, Urine 08/16/2019 15.0  <=20.0 mg/L Final   • Microalbumin/Creatinine Ratio 08/16/2019 7.8  <=30.0 ug/mg Final    • THYROID PEROXIDASE 08/16/2019 <1  0 - 9 IU/mL Final    Results may be falsely decreased if patient taking Biotin.         Assessment/Plan   Age-appropriate Screening Schedule:  Refer to the list below for future screening recommendations based on patient's age, sex and/or medical conditions.      Health Maintenance   Topic Date Due   • TDAP/TD VACCINES (1 - Tdap) 09/27/1966   • ZOSTER VACCINE (1 of 2) 09/27/1997   • PNEUMOCOCCAL VACCINES (65+ LOW/MEDIUM RISK) (1 of 2 - PCV13) 09/27/2012   • INFLUENZA VACCINE  08/01/2019   • DIABETIC FOOT EXAM  08/14/2019   • DIABETIC EYE EXAM  08/14/2019   • COLONOSCOPY  08/14/2019   • HEMOGLOBIN A1C  02/16/2020   • LIPID PANEL  08/16/2020   • URINE MICROALBUMIN  08/16/2020       Medicare Risks and Personalized Health Plan:  Cardiovascular risk  Colon Cancer Screening  Diabetic Lab Screening   Immunizations Discussed/Encouraged (specific immunizations; Shingrix )      CMS-Preventive Services Quick Reference  Medicare Preventive Services Addressed:  Annual Wellness Visit (AWV)    Advance Care Planning:  Patient has an advance directive - a copy has not been provided. Have asked the patient to send this to us to add to record    Diagnoses and all orders for this visit:    1. Encounter for Medicare annual wellness exam (Primary)    2. Gastroesophageal reflux disease without esophagitis    3. Hyperlipidemia, unspecified hyperlipidemia type    4. Benign prostatic hyperplasia with urinary obstruction    Other orders  -     pantoprazole (PROTONIX) 40 MG EC tablet; Take 1 tablet by mouth Daily.  Dispense: 90 tablet; Refill: 3  -     montelukast (SINGULAIR) 10 MG tablet; Take 1 tablet by mouth Every Night.  Dispense: 90 tablet; Refill: 3  -     alfuzosin (UROXATRAL) 10 MG 24 hr tablet; Take 1 tablet by mouth Daily.  Dispense: 90 tablet; Refill: 3  -     atorvastatin (LIPITOR) 40 MG tablet; Take 1 tablet by mouth Daily.  Dispense: 90 tablet; Refill: 3        An After Visit Summary and  PPPS with all of these plans were given to the patient.      Follow Up:  Return in about 1 year (around 10/4/2020) for Medicare Wellness.

## 2019-10-04 NOTE — PATIENT INSTRUCTIONS
Medicare Wellness  Personal Prevention Plan of Service     Date of Office Visit:  10/04/2019  Encounter Provider:  Tammy Rodas MD  Place of Service:  [facility]  Patient Name: Floyd Herrera  :  1945    As part of the Medicare Wellness portion of your visit today, we are providing you with this personalized preventive plan of services (PPPS). This plan is based upon recommendations of the [place] Preventive Services Task Force (USPSTF) and the Advisory Committee on Immunization Practices (ACIP).    This lists the preventive care services that should be considered, and provides dates of when you are due. Items listed as completed are up-to-date and do not require any further intervention.    Health Maintenance   Topic Date Due   • TDAP/TD VACCINES (1 - Tdap) 1966   • ZOSTER VACCINE (1 of 2) 1997   • PNEUMOCOCCAL VACCINES (65+ LOW/MEDIUM RISK) (1 of 2 - PCV13) 2012   • INFLUENZA VACCINE  2019   • HEPATITIS C SCREENING  2019   • MEDICARE ANNUAL WELLNESS  2019   • DIABETIC FOOT EXAM  2019   • DIABETIC EYE EXAM  2019   • COLONOSCOPY  2019   • HEMOGLOBIN A1C  2020   • LIPID PANEL  2020   • URINE MICROALBUMIN  2020   • AAA SCREEN (ONE-TIME)  Completed       No orders of the defined types were placed in this encounter.      No Follow-up on file.

## 2019-10-04 NOTE — PROGRESS NOTES
Subjective   Chief Complaint   Patient presents with   • Medicare Wellness-subsequent     Floyd Herrera is a 74 y.o. male.     Heartburn   He reports no abdominal pain, no belching, no chest pain, no choking, no coughing, no dysphagia, no heartburn, no nausea or no sore throat. This is a chronic problem. The current episode started more than 1 year ago. The problem occurs frequently. The problem has been resolved. The symptoms are aggravated by certain foods. There are no known risk factors. He has tried a PPI for the symptoms. The treatment provided significant relief.   Hyperlipidemia   This is a chronic problem. The current episode started more than 1 year ago. The problem is controlled. Recent lipid tests were reviewed and are normal. There are no known factors aggravating his hyperlipidemia. Pertinent negatives include no chest pain, myalgias or shortness of breath. Current antihyperlipidemic treatment includes statins. The current treatment provides significant improvement of lipids. There are no compliance problems.    Benign Prostatic Hypertrophy   This is a chronic problem. The problem has been resolved since onset. Irritative symptoms do not include frequency or nocturia. Obstructive symptoms do not include dribbling or an intermittent stream. Pertinent negatives include no chills, dysuria, hematuria or nausea. Nothing aggravates the symptoms. Past treatments include alfuzosin. The treatment provided moderate relief.      Past Medical History:   Diagnosis Date   • Diabetes mellitus type I (CMS/HCC)    • Hyperlipidemia    • Hypertension    • Skin cancer      Past Surgical History:   Procedure Laterality Date   • BACK SURGERY     • CARPAL TUNNEL RELEASE     • CATARACT EXTRACTION     • GALLBLADDER SURGERY     • HERNIA REPAIR     • KNEE SURGERY       No Known Allergies  Social History     Socioeconomic History   • Marital status:      Spouse name: Not on file   • Number of children: Not on file    • Years of education: Not on file   • Highest education level: Not on file   Tobacco Use   • Smoking status: Former Smoker   Substance and Sexual Activity   • Alcohol use: Yes     Social History     Tobacco Use   Smoking Status Former Smoker       family history includes Alcohol abuse in his brother; Cancer in his brother and sister; Heart attack in his sister; Stroke in his mother.  Current Outpatient Medications on File Prior to Visit   Medication Sig Dispense Refill   • ACCU-CHEK FASTCLIX LANCETS misc ACCU-CHEK FASTCLIX LANCETS     • aspirin 81 MG tablet ASPIRIN 81 MG ORAL TABLET     • cetirizine (zyrTEC) 10 MG tablet      • fluticasone (FLONASE) 50 MCG/ACT nasal spray FLONASE 50 MCG/ACT NASAL SUSPENSION     • glucose blood (ACCU-CHEK GUIDE) test strip ACCU-CHEK GUIDE STRP     • Insulin Glargine (LANTUS SOLOSTAR) 100 UNIT/ML injection pen LANTUS SOLOSTAR 100 UNIT/ML SOPN     • Insulin Pen Needle (B-D ULTRAFINE III SHORT PEN) 31G X 8 MM misc BD PEN NEEDLE SHORT U/F 31G X 8 MM     • metFORMIN (GLUCOPHAGE) 500 MG tablet Every 12 (Twelve) Hours.     • Multiple Vitamins-Minerals (MULTI VITAMIN/MINERALS) tablet MULTI VITAMIN/MINERALS TABS     • vitamin B-12 (CYANOCOBALAMIN) 1000 MCG tablet Take 1,000 mcg by mouth Daily.     • [DISCONTINUED] alfuzosin (UROXATRAL) 10 MG 24 hr tablet      • [DISCONTINUED] atorvastatin (LIPITOR) 40 MG tablet Take 1 tablet by mouth Daily. 90 tablet 3   • [DISCONTINUED] montelukast (SINGULAIR) 10 MG tablet      • [DISCONTINUED] pantoprazole (PROTONIX) 40 MG EC tablet Daily.     • [DISCONTINUED] raNITIdine (ZANTAC) 150 MG tablet Daily.       No current facility-administered medications on file prior to visit.      Patient Active Problem List   Diagnosis   • Acquired hallux rigidus   • Benign prostatic hyperplasia with urinary obstruction   • Encounter for immunization   • Encounter for general adult medical examination without abnormal findings   • Hyperlipidemia   • Hypertension, benign  "  • Hypogonadism   • Impaired fasting glucose   • Need for prophylactic vaccination with combined diphtheria-tetanus-pertussis (DTP) vaccine   • Osteoarthritis   • Other general symptoms and signs   • Presence of cardiac pacemaker   • Sick sinus syndrome (CMS/HCC)   • Reactive airway disease   • Sleep apnea   • Type 1 diabetes mellitus with hyperglycemia (CMS/HCC)   • Abnormal thyroid function test   • Gastroesophageal reflux disease without esophagitis       The following portions of the patient's history were reviewed and updated as appropriate: allergies, current medications, past family history, past medical history, past social history, past surgical history and problem list.    Review of Systems   Constitutional: Negative for chills and fever.   HENT: Negative for sinus pressure and sore throat.    Eyes: Negative for blurred vision.   Respiratory: Negative for cough, choking and shortness of breath.    Cardiovascular: Negative for chest pain and palpitations.   Gastrointestinal: Negative for abdominal pain, dysphagia and nausea.   Endocrine: Negative for polyuria.   Genitourinary: Negative for dysuria, frequency, hematuria and nocturia.   Musculoskeletal: Negative for myalgias.   Skin: Negative for rash.   Neurological: Negative for dizziness and headache.   Hematological: Negative for adenopathy.   Psychiatric/Behavioral: Negative for depressed mood.       Objective   /76 (BP Location: Right arm, Patient Position: Sitting, Cuff Size: Adult)   Pulse 67   Temp 97.9 °F (36.6 °C) (Oral)   Ht 179.7 cm (70.75\")   Wt 98.9 kg (218 lb)   SpO2 98%   BMI 30.62 kg/m²   Physical Exam   Constitutional: He is oriented to person, place, and time. He appears well-developed. No distress.   HENT:   Head: Normocephalic.   Eyes: Conjunctivae and lids are normal.   Neck: Trachea normal. No thyroid mass and no thyromegaly present.   Cardiovascular: Normal rate, regular rhythm and normal heart sounds.   Pulmonary/Chest: " Effort normal and breath sounds normal.   Lymphadenopathy:     He has no cervical adenopathy.   Neurological: He is alert and oriented to person, place, and time.   Skin: Skin is warm and dry.   Psychiatric: He has a normal mood and affect. His speech is normal and behavior is normal. He is attentive.       No visits with results within 1 Week(s) from this visit.   Latest known visit with results is:   Lab on 08/16/2019   Component Date Value Ref Range Status   • Hemoglobin A1C 08/16/2019 6.2* 3.5 - 5.6 % Final   • Glucose 08/16/2019 128* 65 - 99 mg/dL Final   • BUN 08/16/2019 19  8 - 20 mg/dL Final   • Creatinine 08/16/2019 1.20  0.70 - 1.20 mg/dL Final   • Sodium 08/16/2019 138  136 - 144 mmol/L Final   • Potassium 08/16/2019 4.2  3.6 - 5.1 mmol/L Final   • Chloride 08/16/2019 103  101 - 111 mmol/L Final   • CO2 08/16/2019 26.0  22.0 - 32.0 mmol/L Final   • Calcium 08/16/2019 9.2  8.9 - 10.3 mg/dL Final   • Total Protein 08/16/2019 6.3  6.1 - 7.9 g/dL Final   • Albumin 08/16/2019 4.10  3.50 - 4.80 g/dL Final   • ALT (SGPT) 08/16/2019 60  17 - 63 U/L Final   • AST (SGOT) 08/16/2019 48* 15 - 41 U/L Final   • Alkaline Phosphatase 08/16/2019 51  32 - 91 U/L Final   • Total Bilirubin 08/16/2019 0.9  0.3 - 1.2 mg/dL Final   • eGFR Non African Amer 08/16/2019 60* >60 mL/min/1.73 Final   • Globulin 08/16/2019 2.2* 2.5 - 3.8 gm/dL Final   • A/G Ratio 08/16/2019 1.9* 1.0 - 1.7 g/dL Final   • BUN/Creatinine Ratio 08/16/2019 15.8  6.2 - 20.3 Final   • Anion Gap 08/16/2019 13.2  5.0 - 15.0 mmol/L Final   • Total Cholesterol 08/16/2019 173  <=200 mg/dL Final   • Triglycerides 08/16/2019 113  <=150 mg/dL Final   • HDL Cholesterol 08/16/2019 44  >=39 mg/dL Final   • LDL Cholesterol  08/16/2019 121* 0 - 100 mg/dL Final   • VLDL Cholesterol 08/16/2019 22.6  mg/dL Final   • LDL/HDL Ratio 08/16/2019 2.42   Final   • Chol/HDL Ratio 08/16/2019 3.93   Final   • TSH 08/16/2019 5.510  0.340 - 5.600 mIU/mL Final    Results may be falsely  decreased if patient taking Biotin.   • Free T4 08/16/2019 0.66  0.58 - 1.64 ng/dL Final    Results may be falsely increased if patient taking Biotin.   • Creatinine, Urine 08/16/2019 191.1  mg/dL Final   • Microalbumin, Urine 08/16/2019 15.0  <=20.0 mg/L Final   • Microalbumin/Creatinine Ratio 08/16/2019 7.8  <=30.0 ug/mg Final   • THYROID PEROXIDASE 08/16/2019 <1  0 - 9 IU/mL Final    Results may be falsely decreased if patient taking Biotin.           Assessment/Plan   Problems Addressed this Visit        Cardiovascular and Mediastinum    Hyperlipidemia    Relevant Medications    atorvastatin (LIPITOR) 40 MG tablet       Digestive    Gastroesophageal reflux disease without esophagitis - Primary    Relevant Medications    pantoprazole (PROTONIX) 40 MG EC tablet       Genitourinary    Benign prostatic hyperplasia with urinary obstruction    Relevant Medications    alfuzosin (UROXATRAL) 10 MG 24 hr tablet          Floyd was seen today for medicare wellness-subsequent.    Diagnoses and all orders for this visit:    Gastroesophageal reflux disease without esophagitis    Hyperlipidemia, unspecified hyperlipidemia type    Benign prostatic hyperplasia with urinary obstruction    Other orders  -     pantoprazole (PROTONIX) 40 MG EC tablet; Take 1 tablet by mouth Daily.  -     montelukast (SINGULAIR) 10 MG tablet; Take 1 tablet by mouth Every Night.  -     alfuzosin (UROXATRAL) 10 MG 24 hr tablet; Take 1 tablet by mouth Daily.  -     atorvastatin (LIPITOR) 40 MG tablet; Take 1 tablet by mouth Daily.

## 2019-12-13 ENCOUNTER — LAB (OUTPATIENT)
Dept: LAB | Facility: HOSPITAL | Age: 74
End: 2019-12-13

## 2019-12-13 DIAGNOSIS — I10 HYPERTENSION, BENIGN: ICD-10-CM

## 2019-12-13 DIAGNOSIS — E10.65 TYPE 1 DIABETES MELLITUS WITH HYPERGLYCEMIA (HCC): ICD-10-CM

## 2019-12-13 DIAGNOSIS — R94.6 ABNORMAL THYROID FUNCTION TEST: ICD-10-CM

## 2019-12-13 DIAGNOSIS — E78.5 HYPERLIPIDEMIA, UNSPECIFIED HYPERLIPIDEMIA TYPE: ICD-10-CM

## 2019-12-13 LAB
ALBUMIN SERPL-MCNC: 4.7 G/DL (ref 3.5–5.2)
ALBUMIN UR-MCNC: 4.1 MG/DL
ALBUMIN/GLOB SERPL: 1.9 G/DL
ALP SERPL-CCNC: 66 U/L (ref 39–117)
ALT SERPL W P-5'-P-CCNC: 36 U/L (ref 1–41)
ANION GAP SERPL CALCULATED.3IONS-SCNC: 13 MMOL/L (ref 5–15)
AST SERPL-CCNC: 33 U/L (ref 1–40)
BILIRUB SERPL-MCNC: 0.6 MG/DL (ref 0.2–1.2)
BUN BLD-MCNC: 16 MG/DL (ref 8–23)
BUN/CREAT SERPL: 15.4 (ref 7–25)
CALCIUM SPEC-SCNC: 9.8 MG/DL (ref 8.6–10.5)
CHLORIDE SERPL-SCNC: 105 MMOL/L (ref 98–107)
CHOLEST SERPL-MCNC: 129 MG/DL (ref 0–200)
CO2 SERPL-SCNC: 27 MMOL/L (ref 22–29)
CREAT BLD-MCNC: 1.04 MG/DL (ref 0.76–1.27)
CREAT UR-MCNC: 245 MG/DL
GFR SERPL CREATININE-BSD FRML MDRD: 70 ML/MIN/1.73
GLOBULIN UR ELPH-MCNC: 2.5 GM/DL
GLUCOSE BLD-MCNC: 125 MG/DL (ref 65–99)
HBA1C MFR BLD: 6.5 % (ref 3.5–5.6)
HDLC SERPL-MCNC: 44 MG/DL (ref 40–60)
LDLC SERPL CALC-MCNC: 71 MG/DL (ref 0–100)
LDLC/HDLC SERPL: 1.61 {RATIO}
MICROALBUMIN/CREAT UR: 16.7 MG/G
POTASSIUM BLD-SCNC: 4.7 MMOL/L (ref 3.5–5.2)
PROT SERPL-MCNC: 7.2 G/DL (ref 6–8.5)
SODIUM BLD-SCNC: 145 MMOL/L (ref 136–145)
T4 FREE SERPL-MCNC: 0.95 NG/DL (ref 0.93–1.7)
TRIGL SERPL-MCNC: 70 MG/DL (ref 0–150)
TSH SERPL DL<=0.05 MIU/L-ACNC: 6.63 UIU/ML (ref 0.27–4.2)
VLDLC SERPL-MCNC: 14 MG/DL (ref 5–40)

## 2019-12-13 PROCEDURE — 82570 ASSAY OF URINE CREATININE: CPT

## 2019-12-13 PROCEDURE — 84439 ASSAY OF FREE THYROXINE: CPT

## 2019-12-13 PROCEDURE — 36415 COLL VENOUS BLD VENIPUNCTURE: CPT

## 2019-12-13 PROCEDURE — 84443 ASSAY THYROID STIM HORMONE: CPT

## 2019-12-13 PROCEDURE — 82043 UR ALBUMIN QUANTITATIVE: CPT

## 2019-12-13 PROCEDURE — 83036 HEMOGLOBIN GLYCOSYLATED A1C: CPT

## 2019-12-13 PROCEDURE — 80053 COMPREHEN METABOLIC PANEL: CPT

## 2019-12-13 PROCEDURE — 80061 LIPID PANEL: CPT

## 2019-12-16 ENCOUNTER — TELEPHONE (OUTPATIENT)
Dept: CARDIOLOGY | Facility: CLINIC | Age: 74
End: 2019-12-16

## 2019-12-17 NOTE — TELEPHONE ENCOUNTER
Reviewed last remote check - last charged 8/22/19; pt due 12/13/19, but no report received.    Msg left for pt at home number to send transmission.

## 2019-12-20 ENCOUNTER — OFFICE VISIT (OUTPATIENT)
Dept: ENDOCRINOLOGY | Facility: CLINIC | Age: 74
End: 2019-12-20

## 2019-12-20 ENCOUNTER — CLINICAL SUPPORT NO REQUIREMENTS (OUTPATIENT)
Dept: CARDIOLOGY | Facility: CLINIC | Age: 74
End: 2019-12-20

## 2019-12-20 VITALS
HEART RATE: 70 BPM | SYSTOLIC BLOOD PRESSURE: 132 MMHG | WEIGHT: 215 LBS | DIASTOLIC BLOOD PRESSURE: 82 MMHG | OXYGEN SATURATION: 97 % | BODY MASS INDEX: 42.21 KG/M2 | HEIGHT: 60 IN

## 2019-12-20 DIAGNOSIS — Z95.0 PACEMAKER: Primary | ICD-10-CM

## 2019-12-20 DIAGNOSIS — I49.5 SICK SINUS SYNDROME (HCC): ICD-10-CM

## 2019-12-20 DIAGNOSIS — R94.6 ABNORMAL THYROID FUNCTION TEST: ICD-10-CM

## 2019-12-20 DIAGNOSIS — E78.2 MIXED HYPERLIPIDEMIA: ICD-10-CM

## 2019-12-20 DIAGNOSIS — I10 HYPERTENSION, BENIGN: ICD-10-CM

## 2019-12-20 DIAGNOSIS — E10.65 TYPE 1 DIABETES MELLITUS WITH HYPERGLYCEMIA (HCC): Primary | ICD-10-CM

## 2019-12-20 LAB — GLUCOSE BLDC GLUCOMTR-MCNC: 189 MG/DL (ref 70–130)

## 2019-12-20 PROCEDURE — 93296 REM INTERROG EVL PM/IDS: CPT | Performed by: INTERNAL MEDICINE

## 2019-12-20 PROCEDURE — 93294 REM INTERROG EVL PM/LDLS PM: CPT | Performed by: INTERNAL MEDICINE

## 2019-12-20 PROCEDURE — 82962 GLUCOSE BLOOD TEST: CPT | Performed by: INTERNAL MEDICINE

## 2019-12-20 PROCEDURE — 99214 OFFICE O/P EST MOD 30 MIN: CPT | Performed by: INTERNAL MEDICINE

## 2019-12-20 NOTE — PATIENT INSTRUCTIONS
Continue current medications  Always keep glucose source with you in case of low blood sugar  Follow-up in 6 months with labs  Always get annual eye exam and flu vaccine

## 2019-12-20 NOTE — PROGRESS NOTES
Endocrine Progress Note Outpatient     Patient Care Team:  Tammy Rodas MD as PCP - General (Family Medicine)    Chief Complaint: Follow-up type 1 diabetes    HPI: 74-year-old male with history of type 1 diabetes, hypertension, hyperlipidemia is here for follow-up.  He has high mario 65 antibodies been mid normal C-peptide therefore he is a still on metformin 500 twice a day along with Lantus 5 units subcu daily.  Blood sugars at home are doing very well.  I reviewed his records and are mostly less than 120.  He checks his blood sugars twice a day. He does not have low blood sugars of less than 70.    Hypertension: Well-controlled  Hyperlipidemia: On simvastatin.    Does have high normal TSH however he denies any dry skin, hair loss, constipation.  He tells me that if he is sitting he might doze off, eyes fatigue and tiredness.  He is not on any thyroid medications at this time.    Past Medical History:   Diagnosis Date   • Diabetes mellitus type I (CMS/[facility])    • Hyperlipidemia    • Hypertension    • Skin cancer        Social History     Socioeconomic History   • Marital status:      Spouse name: Not on file   • Number of children: Not on file   • Years of education: Not on file   • Highest education level: Not on file   Tobacco Use   • Smoking status: Former Smoker   • Smokeless tobacco: Former User   Substance and Sexual Activity   • Alcohol use: Yes   • Drug use: Never   • Sexual activity: Never       Family History   Problem Relation Age of Onset   • Stroke Mother    • Heart attack Sister    • Cancer Sister         colon   • Cancer Brother         lung   • Alcohol abuse Brother        No Known Allergies    ROS:   Constitutional:  Denies fatigue, tiredness.    Eyes:  Denies change in visual acuity   HENT:  Denies nasal congestion or sore throat   Respiratory: denies cough, shortness of breath.   Cardiovascular:  denies chest pain, edema   GI:  Denies abdominal pain, nausea, vomiting.   Musculoskeletal:   Denies back pain or joint pain   Integument:  Denies dry skin and rash   Neurologic:  Denies headache, focal weakness or sensory changes   Endocrine:  Denies polyuria or polydipsia   Psychiatric:  Denies depression or anxiety      Vitals:    12/20/19 0916   BP: 132/82   Pulse: 70   SpO2: 97%       Physical Exam:  GEN: NAD, conversant  EYES: EOMI, PERRL, no conjunctival erythema  NECK: no thyromegaly, full ROM   CV: RRR, no murmurs/rubs/gallops, no peripheral edema  LUNG: CTAB, no wheezes/rales/ronchi  SKIN: no rashes, no acanthosis  MSK: no deformities, full ROM of all extremities  NEURO: no tremors, DTR normal  PSYCH: AOX3, appropriate mood, affect normal      Results Review:     I reviewed the patient's new clinical results.    Lab Results   Component Value Date    HGBA1C 6.5 (H) 12/13/2019    HGBA1C 6.2 (H) 08/16/2019    HGBA1C 5.7 (H) 05/14/2019      Lab Results   Component Value Date    GLUCOSE 125 (H) 12/13/2019    BUN 16 12/13/2019    CREATININE 1.04 12/13/2019    EGFRIFNONA 70 12/13/2019    BCR 15.4 12/13/2019    K 4.7 12/13/2019    CO2 27.0 12/13/2019    CALCIUM 9.8 12/13/2019    ALBUMIN 4.70 12/13/2019    LABIL2 1.6 05/14/2019    AST 33 12/13/2019    ALT 36 12/13/2019    CHOL 129 12/13/2019    TRIG 70 12/13/2019    LDL 71 12/13/2019    HDL 44 12/13/2019     Lab Results   Component Value Date    TSH 6.630 (H) 12/13/2019    FREET4 0.95 12/13/2019         Medication Review: Reviewed.       Current Outpatient Medications:   •  ACCU-CHEK FASTCLIX LANCETS misc, Use to check blood sugar twice daily, Disp: , Rfl:   •  alfuzosin (UROXATRAL) 10 MG 24 hr tablet, Take 1 tablet by mouth Daily., Disp: 90 tablet, Rfl: 3  •  aspirin 81 MG tablet, ASPIRIN 81 MG ORAL TABLET, Disp: , Rfl:   •  atorvastatin (LIPITOR) 40 MG tablet, Take 1 tablet by mouth Daily., Disp: 90 tablet, Rfl: 3  •  cetirizine (zyrTEC) 10 MG tablet, , Disp: , Rfl:   •  fluticasone (FLONASE) 50 MCG/ACT nasal spray, As Needed., Disp: , Rfl:   •  glucose  "blood (ACCU-CHEK GUIDE) test strip, Use to check blood sugar twice daily, Disp: , Rfl:   •  Insulin Glargine (LANTUS SOLOSTAR) 100 UNIT/ML injection pen, LANTUS SOLOSTAR 100 UNIT/ML SOPN, Disp: , Rfl:   •  Insulin Pen Needle (B-D ULTRAFINE III SHORT PEN) 31G X 8 MM misc, BD PEN NEEDLE SHORT U/F 31G X 8 MM, Disp: , Rfl:   •  metFORMIN (GLUCOPHAGE) 500 MG tablet, Every 12 (Twelve) Hours., Disp: , Rfl:   •  montelukast (SINGULAIR) 10 MG tablet, Take 1 tablet by mouth Every Night., Disp: 90 tablet, Rfl: 3  •  Multiple Vitamins-Minerals (MULTI VITAMIN/MINERALS) tablet, MULTI VITAMIN/MINERALS TABS, Disp: , Rfl:   •  pantoprazole (PROTONIX) 40 MG EC tablet, Take 1 tablet by mouth Daily., Disp: 90 tablet, Rfl: 3  •  vitamin B-12 (CYANOCOBALAMIN) 1000 MCG tablet, Take 1,000 mcg by mouth Daily., Disp: , Rfl:       Assessment/Plan   1. Diabetes mellitus type 1: Excellent control, continue current medications.  2. Hypertension: He is currently not on any blood pressure medications.  Advised to watch blood pressure at home and decrease salt intake  3. Hyperlipidemia: Much better with LDL now at 71 and triglycerides 70.  He is currently on atorvastatin.  Will continue that.    4.  Abnormal mild high TSH, TPO antibodies are negative and he is relatively asymptomatic.  I will follow TSH and free T4.            Sania Mays MD FACE.  06/15/19  4:34 PM      EMR Dragon / transcription disclaimer:     \"Dictated utilizing Dragon dictation\".                 "

## 2020-03-20 ENCOUNTER — CLINICAL SUPPORT NO REQUIREMENTS (OUTPATIENT)
Dept: CARDIOLOGY | Facility: CLINIC | Age: 75
End: 2020-03-20

## 2020-03-20 ENCOUNTER — OFFICE VISIT (OUTPATIENT)
Dept: CARDIOLOGY | Facility: CLINIC | Age: 75
End: 2020-03-20

## 2020-03-20 VITALS
BODY MASS INDEX: 29.66 KG/M2 | SYSTOLIC BLOOD PRESSURE: 136 MMHG | WEIGHT: 219 LBS | HEART RATE: 71 BPM | HEIGHT: 72 IN | OXYGEN SATURATION: 94 % | DIASTOLIC BLOOD PRESSURE: 79 MMHG

## 2020-03-20 DIAGNOSIS — G47.30 SLEEP APNEA, UNSPECIFIED TYPE: ICD-10-CM

## 2020-03-20 DIAGNOSIS — Z95.0 PRESENCE OF CARDIAC PACEMAKER: Primary | ICD-10-CM

## 2020-03-20 DIAGNOSIS — I49.5 SICK SINUS SYNDROME (HCC): ICD-10-CM

## 2020-03-20 DIAGNOSIS — E78.2 MIXED HYPERLIPIDEMIA: ICD-10-CM

## 2020-03-20 PROCEDURE — 93280 PM DEVICE PROGR EVAL DUAL: CPT | Performed by: INTERNAL MEDICINE

## 2020-03-20 PROCEDURE — 99213 OFFICE O/P EST LOW 20 MIN: CPT | Performed by: INTERNAL MEDICINE

## 2020-03-20 NOTE — PROGRESS NOTES
"    Subjective:     Encounter Date:03/20/2020      Patient ID: Floyd Herrera is a 74 y.o. male.    Chief Complaint: Follow-up for HTN & HLD, pacemaker check  History of Present Illness     74 year-old white male patient with known history of hypertension and dyslipidemia  status post permanent pacemaker placement   for sick sinus syndrome,  comes back for follow up.         patient is doing well without any active cardiac symptoms  EKG showed atrial pacer rhythm  Patient was recently diagnosed with type 2 diabetes mellitus   pacemaker working well,         His labs show TSH mildly elevated he does labs with endocrinology office        Will followup in 1 year with pacemaker check and EKG    The following portions of the patient's history were reviewed and updated as appropriate: Allergies current medications past family history past medical history past social history past surgical history problem list and review of systems  Past Medical History:   Diagnosis Date   • Diabetes mellitus type I (CMS/HCC)    • Hyperlipidemia    • Hypertension    • Skin cancer      Past Surgical History:   Procedure Laterality Date   • BACK SURGERY     • CARPAL TUNNEL RELEASE     • CATARACT EXTRACTION     • COLONOSCOPY  09/29/2017   • GALLBLADDER SURGERY     • HERNIA REPAIR     • KNEE SURGERY       /79 (BP Location: Left arm, Patient Position: Sitting, Cuff Size: Adult)   Pulse 71   Ht 182.9 cm (72\")   Wt 99.3 kg (219 lb)   SpO2 94%   BMI 29.70 kg/m²   Family History   Problem Relation Age of Onset   • Stroke Mother    • Heart attack Sister    • Cancer Sister         colon   • Cancer Brother         lung   • Alcohol abuse Brother        Current Outpatient Medications:   •  ACCU-CHEK FASTCLIX LANCETS misc, Use to check blood sugar twice daily, Disp: , Rfl:   •  alfuzosin (UROXATRAL) 10 MG 24 hr tablet, Take 1 tablet by mouth Daily., Disp: 90 tablet, Rfl: 3  •  aspirin 81 MG tablet, ASPIRIN 81 MG ORAL TABLET, Disp: , Rfl: "   •  atorvastatin (LIPITOR) 40 MG tablet, Take 1 tablet by mouth Daily., Disp: 90 tablet, Rfl: 3  •  cetirizine (zyrTEC) 10 MG tablet, , Disp: , Rfl:   •  fluticasone (FLONASE) 50 MCG/ACT nasal spray, As Needed., Disp: , Rfl:   •  glucose blood (ACCU-CHEK GUIDE) test strip, Use to check blood sugar twice daily, Disp: , Rfl:   •  Insulin Glargine (LANTUS SOLOSTAR) 100 UNIT/ML injection pen, LANTUS SOLOSTAR 100 UNIT/ML SOPN, Disp: , Rfl:   •  Insulin Pen Needle (B-D ULTRAFINE III SHORT PEN) 31G X 8 MM misc, BD PEN NEEDLE SHORT U/F 31G X 8 MM, Disp: , Rfl:   •  metFORMIN (GLUCOPHAGE) 500 MG tablet, Every 12 (Twelve) Hours., Disp: , Rfl:   •  montelukast (SINGULAIR) 10 MG tablet, Take 1 tablet by mouth Every Night., Disp: 90 tablet, Rfl: 3  •  Multiple Vitamins-Minerals (MULTI VITAMIN/MINERALS) tablet, MULTI VITAMIN/MINERALS TABS, Disp: , Rfl:   •  pantoprazole (PROTONIX) 40 MG EC tablet, Take 1 tablet by mouth Daily., Disp: 90 tablet, Rfl: 3  •  vitamin B-12 (CYANOCOBALAMIN) 1000 MCG tablet, Take 1,000 mcg by mouth Daily., Disp: , Rfl:   Social History     Socioeconomic History   • Marital status:      Spouse name: Not on file   • Number of children: Not on file   • Years of education: Not on file   • Highest education level: Not on file   Tobacco Use   • Smoking status: Former Smoker   • Smokeless tobacco: Former User   Substance and Sexual Activity   • Alcohol use: Yes   • Drug use: Never   • Sexual activity: Never     No Known Allergies  Review of Systems   Constitution: Negative for fever and malaise/fatigue.   HENT: Negative for congestion and hearing loss.    Eyes: Negative for double vision and visual disturbance.   Cardiovascular: Negative for chest pain, claudication, dyspnea on exertion, leg swelling and syncope.   Respiratory: Negative for cough and shortness of breath.    Endocrine: Negative for cold intolerance.   Skin: Negative for color change and rash.   Musculoskeletal: Negative for arthritis  and joint pain.   Gastrointestinal: Negative for abdominal pain and heartburn.   Genitourinary: Negative for hematuria.   Neurological: Negative for excessive daytime sleepiness.   Psychiatric/Behavioral: Negative for depression. The patient is not nervous/anxious.    All other systems reviewed and are negative.             Objective:     Physical Exam   Constitutional: He is oriented to person, place, and time. He appears well-developed and well-nourished. He is cooperative.   HENT:   Head: Normocephalic and atraumatic.   Mouth/Throat: Uvula is midline and oropharynx is clear and moist. No oral lesions.   Eyes: Conjunctivae are normal. No scleral icterus.   Neck: Trachea normal. Neck supple. No JVD present. Carotid bruit is not present. No thyromegaly present.   Cardiovascular: Normal rate, regular rhythm, S1 normal, S2 normal, normal heart sounds, intact distal pulses and normal pulses. PMI is not displaced. Exam reveals no gallop and no friction rub.   No murmur heard.  Pulmonary/Chest: Effort normal and breath sounds normal.   Abdominal: Soft. Bowel sounds are normal.   Musculoskeletal: Normal range of motion.   Neurological: He is alert and oriented to person, place, and time. He has normal strength.   No focal deficits   Skin: Skin is warm. No cyanosis.   Psychiatric: He has a normal mood and affect.         ECG 12 Lead  Date/Time: 3/20/2020 2:30 PM  Performed by: Rodger Limon MD  Authorized by: Rodger Limon MD   Comments: AV paced rhythm compared to the last EKG no significant changes noted            Lab Review:       Assessment:          Diagnosis Plan   1. Presence of cardiac pacemaker     2. Sick sinus syndrome (CMS/HCC)            Plan:         Pacemaker functioning well continue current medical treatment

## 2020-05-07 RX ORDER — MONTELUKAST SODIUM 10 MG/1
TABLET ORAL
Qty: 90 TABLET | Refills: 3 | Status: SHIPPED | OUTPATIENT
Start: 2020-05-07 | End: 2021-04-16

## 2020-06-10 ENCOUNTER — LAB (OUTPATIENT)
Dept: LAB | Facility: HOSPITAL | Age: 75
End: 2020-06-10

## 2020-06-10 DIAGNOSIS — R94.6 ABNORMAL THYROID FUNCTION TEST: ICD-10-CM

## 2020-06-10 DIAGNOSIS — E78.2 MIXED HYPERLIPIDEMIA: ICD-10-CM

## 2020-06-10 DIAGNOSIS — E10.65 TYPE 1 DIABETES MELLITUS WITH HYPERGLYCEMIA (HCC): ICD-10-CM

## 2020-06-10 DIAGNOSIS — I10 HYPERTENSION, BENIGN: ICD-10-CM

## 2020-06-10 LAB
ALBUMIN SERPL-MCNC: 4.4 G/DL (ref 3.5–5.2)
ALBUMIN UR-MCNC: 2.1 MG/DL
ALBUMIN/GLOB SERPL: 1.9 G/DL
ALP SERPL-CCNC: 73 U/L (ref 39–117)
ALT SERPL W P-5'-P-CCNC: 29 U/L (ref 1–41)
ANION GAP SERPL CALCULATED.3IONS-SCNC: 9.5 MMOL/L (ref 5–15)
AST SERPL-CCNC: 24 U/L (ref 1–40)
BILIRUB SERPL-MCNC: 0.5 MG/DL (ref 0.2–1.2)
BUN BLD-MCNC: 22 MG/DL (ref 8–23)
BUN/CREAT SERPL: 19 (ref 7–25)
CALCIUM SPEC-SCNC: 9.5 MG/DL (ref 8.6–10.5)
CHLORIDE SERPL-SCNC: 105 MMOL/L (ref 98–107)
CHOLEST SERPL-MCNC: 122 MG/DL (ref 0–200)
CO2 SERPL-SCNC: 25.5 MMOL/L (ref 22–29)
CREAT BLD-MCNC: 1.16 MG/DL (ref 0.76–1.27)
CREAT UR-MCNC: 266.7 MG/DL
GFR SERPL CREATININE-BSD FRML MDRD: 62 ML/MIN/1.73
GLOBULIN UR ELPH-MCNC: 2.3 GM/DL
GLUCOSE BLD-MCNC: 126 MG/DL (ref 65–99)
HDLC SERPL-MCNC: 45 MG/DL (ref 40–60)
LDLC SERPL CALC-MCNC: 63 MG/DL (ref 0–100)
LDLC/HDLC SERPL: 1.4 {RATIO}
MICROALBUMIN/CREAT UR: 7.9 MG/G
POTASSIUM BLD-SCNC: 4.8 MMOL/L (ref 3.5–5.2)
PROT SERPL-MCNC: 6.7 G/DL (ref 6–8.5)
SODIUM BLD-SCNC: 140 MMOL/L (ref 136–145)
T4 FREE SERPL-MCNC: 0.95 NG/DL (ref 0.93–1.7)
TRIGL SERPL-MCNC: 70 MG/DL (ref 0–150)
TSH SERPL DL<=0.05 MIU/L-ACNC: 5.86 UIU/ML (ref 0.27–4.2)
VLDLC SERPL-MCNC: 14 MG/DL (ref 5–40)

## 2020-06-10 PROCEDURE — 83036 HEMOGLOBIN GLYCOSYLATED A1C: CPT

## 2020-06-10 PROCEDURE — 80061 LIPID PANEL: CPT

## 2020-06-10 PROCEDURE — 84439 ASSAY OF FREE THYROXINE: CPT

## 2020-06-10 PROCEDURE — 80053 COMPREHEN METABOLIC PANEL: CPT

## 2020-06-10 PROCEDURE — 82043 UR ALBUMIN QUANTITATIVE: CPT

## 2020-06-10 PROCEDURE — 84443 ASSAY THYROID STIM HORMONE: CPT

## 2020-06-10 PROCEDURE — 36415 COLL VENOUS BLD VENIPUNCTURE: CPT

## 2020-06-10 PROCEDURE — 82570 ASSAY OF URINE CREATININE: CPT

## 2020-06-11 LAB — HBA1C MFR BLD: 6.3 % (ref 3.5–5.6)

## 2020-06-26 ENCOUNTER — OFFICE VISIT (OUTPATIENT)
Dept: ENDOCRINOLOGY | Facility: CLINIC | Age: 75
End: 2020-06-26

## 2020-06-26 ENCOUNTER — CLINICAL SUPPORT NO REQUIREMENTS (OUTPATIENT)
Dept: CARDIOLOGY | Facility: CLINIC | Age: 75
End: 2020-06-26

## 2020-06-26 VITALS
DIASTOLIC BLOOD PRESSURE: 70 MMHG | WEIGHT: 214 LBS | HEART RATE: 67 BPM | SYSTOLIC BLOOD PRESSURE: 110 MMHG | HEIGHT: 72 IN | TEMPERATURE: 97.3 F | OXYGEN SATURATION: 96 % | BODY MASS INDEX: 28.99 KG/M2

## 2020-06-26 DIAGNOSIS — I49.5 SICK SINUS SYNDROME (HCC): ICD-10-CM

## 2020-06-26 DIAGNOSIS — E10.65 TYPE 1 DIABETES MELLITUS WITH HYPERGLYCEMIA (HCC): Primary | ICD-10-CM

## 2020-06-26 DIAGNOSIS — I10 HYPERTENSION, BENIGN: ICD-10-CM

## 2020-06-26 DIAGNOSIS — E78.2 MIXED HYPERLIPIDEMIA: ICD-10-CM

## 2020-06-26 DIAGNOSIS — R94.6 ABNORMAL THYROID FUNCTION TEST: ICD-10-CM

## 2020-06-26 DIAGNOSIS — R73.9 HYPERGLYCEMIA: ICD-10-CM

## 2020-06-26 DIAGNOSIS — Z95.0 PACEMAKER: Primary | ICD-10-CM

## 2020-06-26 LAB — GLUCOSE BLDC GLUCOMTR-MCNC: 177 MG/DL (ref 70–130)

## 2020-06-26 PROCEDURE — 93296 REM INTERROG EVL PM/IDS: CPT | Performed by: INTERNAL MEDICINE

## 2020-06-26 PROCEDURE — 93294 REM INTERROG EVL PM/LDLS PM: CPT | Performed by: INTERNAL MEDICINE

## 2020-06-26 PROCEDURE — 99214 OFFICE O/P EST MOD 30 MIN: CPT | Performed by: INTERNAL MEDICINE

## 2020-06-26 PROCEDURE — 82962 GLUCOSE BLOOD TEST: CPT | Performed by: INTERNAL MEDICINE

## 2020-06-26 NOTE — PATIENT INSTRUCTIONS
Continue current medications  Always keep glucose source with you in case of low blood sugar  Continue to work on your diet and activity  Get eye exam done  Follow-up in 6 months with labs.

## 2020-06-26 NOTE — PROGRESS NOTES
Endocrine Progress Note Outpatient     Patient Care Team:  Tammy Rodas MD as PCP - General (Family Medicine)  Rodger Limon MD as Consulting Physician (Cardiology)    Chief Complaint: Follow-up type 1 diabetes    HPI: 74-year-old male with history of type 1 diabetes, hypertension, hyperlipidemia is here for follow-up.  He has high mario 65 antibodies been mid normal C-peptide therefore he is a still on metformin 500 twice a day along with Lantus 5 units subcu daily.  Blood sugars at home are doing very well.  I reviewed his records and are mostly less than 120.  He checks his blood sugars twice a day. He does not have low blood sugars of less than 70.    Hypertension: Well-controlled  Hyperlipidemia: On simvastatin.    Does have high normal TSH however he denies any dry skin, hair loss, constipation.  He tells me that if he is sitting he might doze off, eyes fatigue and tiredness.  He is not on any thyroid medications at this time.    Past Medical History:   Diagnosis Date   • Diabetes mellitus type I (CMS/[facility])    • Hyperlipidemia    • Hypertension    • Skin cancer        Social History     Socioeconomic History   • Marital status:      Spouse name: Not on file   • Number of children: Not on file   • Years of education: Not on file   • Highest education level: Not on file   Tobacco Use   • Smoking status: Former Smoker   • Smokeless tobacco: Never Used   Substance and Sexual Activity   • Alcohol use: Yes     Comment: occ   • Drug use: Never   • Sexual activity: Never       Family History   Problem Relation Age of Onset   • Stroke Mother    • Heart attack Sister    • Cancer Sister         colon   • Cancer Brother         lung   • Alcohol abuse Brother        No Known Allergies    ROS:   Constitutional:  Denies fatigue, tiredness.    Eyes:  Denies change in visual acuity   HENT:  Denies nasal congestion or sore throat   Respiratory: denies cough, shortness of breath.   Cardiovascular:  denies  chest pain, edema   GI:  Denies abdominal pain, nausea, vomiting.   Musculoskeletal:  Denies back pain or joint pain   Integument:  Denies dry skin and rash   Neurologic:  Denies headache, focal weakness or sensory changes   Endocrine:  Denies polyuria or polydipsia   Psychiatric:  Denies depression or anxiety      Vitals:    06/26/20 0822   BP: 110/70   Pulse: 67   Temp: 97.3 °F (36.3 °C)   SpO2: 96%       Physical Exam:  GEN: NAD, conversant  EYES: EOMI, PERRL, no conjunctival erythema  NECK: no thyromegaly, full ROM   CV: RRR, no murmurs/rubs/gallops, no peripheral edema  LUNG: CTAB, no wheezes/rales/ronchi  SKIN: no rashes, no acanthosis  MSK: no deformities, full ROM of all extremities  NEURO: no tremors, DTR normal  PSYCH: AOX3, appropriate mood, affect normal  FEET: No ulcer or calluses. 10 gm monofilament intact on both feet.     Results Review:     I reviewed the patient's new clinical results.    Lab Results   Component Value Date    HGBA1C 6.3 (H) 06/10/2020    HGBA1C 6.5 (H) 12/13/2019    HGBA1C 6.2 (H) 08/16/2019      Lab Results   Component Value Date    GLUCOSE 126 (H) 06/10/2020    BUN 22 06/10/2020    CREATININE 1.16 06/10/2020    EGFRIFNONA 62 06/10/2020    BCR 19.0 06/10/2020    K 4.8 06/10/2020    CO2 25.5 06/10/2020    CALCIUM 9.5 06/10/2020    ALBUMIN 4.40 06/10/2020    LABIL2 1.6 05/14/2019    AST 24 06/10/2020    ALT 29 06/10/2020    CHOL 122 06/10/2020    TRIG 70 06/10/2020    LDL 63 06/10/2020    HDL 45 06/10/2020     Lab Results   Component Value Date    TSH 5.860 (H) 06/10/2020    FREET4 0.95 06/10/2020         Medication Review: Reviewed.       Current Outpatient Medications:   •  ACCU-CHEK FASTCLIX LANCETS misc, Use to check blood sugar twice daily, Disp: , Rfl:   •  alfuzosin (UROXATRAL) 10 MG 24 hr tablet, Take 1 tablet by mouth Daily., Disp: 90 tablet, Rfl: 3  •  aspirin 81 MG tablet, ASPIRIN 81 MG ORAL TABLET, Disp: , Rfl:   •  atorvastatin (LIPITOR) 40 MG tablet, Take 1 tablet by  "mouth Daily., Disp: 90 tablet, Rfl: 3  •  cetirizine (zyrTEC) 10 MG tablet, , Disp: , Rfl:   •  fluticasone (FLONASE) 50 MCG/ACT nasal spray, As Needed., Disp: , Rfl:   •  glucose blood (ACCU-CHEK GUIDE) test strip, Use to check blood sugar twice daily, Disp: , Rfl:   •  Insulin Glargine (LANTUS SOLOSTAR) 100 UNIT/ML injection pen, LANTUS SOLOSTAR 100 UNIT/ML SOPN, Disp: , Rfl:   •  Insulin Pen Needle (B-D ULTRAFINE III SHORT PEN) 31G X 8 MM misc, BD PEN NEEDLE SHORT U/F 31G X 8 MM, Disp: , Rfl:   •  metFORMIN (GLUCOPHAGE) 500 MG tablet, Take 500 mg by mouth 2 (Two) Times a Day With Meals., Disp: , Rfl:   •  montelukast (SINGULAIR) 10 MG tablet, TAKE 1 TABLET EVERY DAY, Disp: 90 tablet, Rfl: 3  •  Multiple Vitamins-Minerals (MULTI VITAMIN/MINERALS) tablet, MULTI VITAMIN/MINERALS TABS, Disp: , Rfl:   •  pantoprazole (PROTONIX) 40 MG EC tablet, Take 1 tablet by mouth Daily., Disp: 90 tablet, Rfl: 3  •  vitamin B-12 (CYANOCOBALAMIN) 1000 MCG tablet, Take 1,000 mcg by mouth Daily., Disp: , Rfl:       Assessment/Plan   1. Diabetes mellitus type 1: Excellent control, continue current medications.  Always keep glucose source in case of low blood sugar also advised to get regular eye exam and flu vaccine.  2. Hypertension: He is currently not on any blood pressure medications.  Advised to watch blood pressure at home and decrease salt intake  3. Hyperlipidemia: Much better with LDL now at 63 and triglycerides 70.  He is currently on atorvastatin.  Will continue that.    4.  Abnormal mild high TSH, TPO antibodies are negative and he is relatively asymptomatic.  I will follow TSH and free T4.             Sania Mays MD FACE.        EMR Dragon / transcription disclaimer:     \"Dictated utilizing Dragon dictation\".                 "

## 2020-06-30 ENCOUNTER — TELEPHONE (OUTPATIENT)
Dept: CARDIOLOGY | Facility: CLINIC | Age: 75
End: 2020-06-30

## 2020-06-30 NOTE — PROGRESS NOTES
Spoke with patient regarding remote device check. No chest pain reported, he has had some dizziness. Note sent to Dr Moss, advised appointment if issues continue.

## 2020-07-01 NOTE — TELEPHONE ENCOUNTER
Faxed [facility] rec request to [facility]  Fax # [phone]  For recent cardiac test

## 2020-07-01 NOTE — TELEPHONE ENCOUNTER
Left message for patient- no results received yet, please sched an appointment and let us know who/where to call to get the results prior.

## 2020-07-10 NOTE — TELEPHONE ENCOUNTER
Pt stated he was scheduled to see [facility] in Aug. Requested appt after that so he could  the results. If he is able to get them prior he will call us to move appt sooner

## 2020-07-29 ENCOUNTER — TELEPHONE (OUTPATIENT)
Dept: ORTHOPEDIC SURGERY | Facility: CLINIC | Age: 75
End: 2020-07-29

## 2020-08-17 ENCOUNTER — OFFICE VISIT (OUTPATIENT)
Dept: CARDIOLOGY | Facility: CLINIC | Age: 75
End: 2020-08-17

## 2020-08-17 VITALS
WEIGHT: 209 LBS | SYSTOLIC BLOOD PRESSURE: 157 MMHG | HEART RATE: 66 BPM | HEIGHT: 72 IN | DIASTOLIC BLOOD PRESSURE: 75 MMHG | OXYGEN SATURATION: 98 % | BODY MASS INDEX: 28.31 KG/M2

## 2020-08-17 DIAGNOSIS — G47.30 SLEEP APNEA, UNSPECIFIED TYPE: ICD-10-CM

## 2020-08-17 DIAGNOSIS — E10.65 TYPE 1 DIABETES MELLITUS WITH HYPERGLYCEMIA (HCC): ICD-10-CM

## 2020-08-17 DIAGNOSIS — I10 HYPERTENSION, BENIGN: ICD-10-CM

## 2020-08-17 DIAGNOSIS — E78.2 MIXED HYPERLIPIDEMIA: Primary | ICD-10-CM

## 2020-08-17 DIAGNOSIS — Z95.0 PRESENCE OF CARDIAC PACEMAKER: ICD-10-CM

## 2020-08-17 DIAGNOSIS — R42 DIZZINESS: ICD-10-CM

## 2020-08-17 PROCEDURE — 99214 OFFICE O/P EST MOD 30 MIN: CPT | Performed by: INTERNAL MEDICINE

## 2020-08-17 NOTE — PROGRESS NOTES
"    Subjective:     Encounter Date:08/17/2020      Patient ID: Floyd Herrera is a 74 y.o. male.    Chief Complaint: HTN and HLD f/u   History of Present Illness     74 year-old white male patient with known history of hypertension diabetes dyslipidemia  status post permanent pacemaker placement   for sick sinus syndrome,  comes back for follow up.        Patient [facility] an [facility]ogram [facility] will [facility]  Last night he had some sinus congestion then he started using the CPAP machine felt short of breath and then got anxious felt lightheaded blood pressure dropped to 90/60 then resolved within few seconds  Today he denies of any shortness of breath or chest pain he looks alert oriented not in any acute distress        His labs show TSH mildly elevated he does labs with endocrinology office     I advised patient to follow-up with PCP regarding history of asthma obstructive sleep apnea and shortness of breath  Cardiac wise appears to be stable if he have any recurrent problems he will contact me  Will followup in 1 year with pacemaker check and EKG    The following portions of the patient's history were reviewed and updated as appropriate: Allergies current medications past family history past medical history past social history past surgical history problem list and review of systems  Past Medical History:   Diagnosis Date   • Diabetes mellitus type I (CMS/HCC)    • Hyperlipidemia    • Hypertension    • Skin cancer      Past Surgical History:   Procedure Laterality Date   • BACK SURGERY     • CARDIOVASCULAR STRESS TEST      Medications induced sress test   • CARPAL TUNNEL RELEASE     • CATARACT EXTRACTION     • COLONOSCOPY  09/29/2017   • GALLBLADDER SURGERY     • HERNIA REPAIR     • KNEE SURGERY       /75 (BP Location: Left arm, Patient Position: Sitting, Cuff Size: Adult)   Pulse 66   Ht 182.9 cm (72\")   Wt 94.8 kg (209 lb)   SpO2 98%   BMI 28.35 kg/m²   Family " History   Problem Relation Age of Onset   • Stroke Mother    • Heart attack Sister    • Cancer Sister         colon   • Cancer Brother         lung   • Alcohol abuse Brother        Current Outpatient Medications:   •  ACCU-CHEK FASTCLIX LANCETS misc, Use to check blood sugar twice daily, Disp: , Rfl:   •  alfuzosin (UROXATRAL) 10 MG 24 hr tablet, Take 1 tablet by mouth Daily., Disp: 90 tablet, Rfl: 3  •  aspirin 81 MG tablet, ASPIRIN 81 MG ORAL TABLET, Disp: , Rfl:   •  atorvastatin (LIPITOR) 40 MG tablet, Take 1 tablet by mouth Daily., Disp: 90 tablet, Rfl: 3  •  cetirizine (zyrTEC) 10 MG tablet, , Disp: , Rfl:   •  fluticasone (FLONASE) 50 MCG/ACT nasal spray, As Needed., Disp: , Rfl:   •  glucose blood (ACCU-CHEK GUIDE) test strip, Use to check blood sugar twice daily, Disp: , Rfl:   •  Insulin Glargine (LANTUS SOLOSTAR) 100 UNIT/ML injection pen, LANTUS SOLOSTAR 100 UNIT/ML SOPN, Disp: , Rfl:   •  Insulin Pen Needle (B-D ULTRAFINE III SHORT PEN) 31G X 8 MM misc, BD PEN NEEDLE SHORT U/F 31G X 8 MM, Disp: , Rfl:   •  metFORMIN (GLUCOPHAGE) 500 MG tablet, Take 500 mg by mouth 2 (Two) Times a Day With Meals., Disp: , Rfl:   •  montelukast (SINGULAIR) 10 MG tablet, TAKE 1 TABLET EVERY DAY, Disp: 90 tablet, Rfl: 3  •  Multiple Vitamins-Minerals (MULTI VITAMIN/MINERALS) tablet, MULTI VITAMIN/MINERALS TABS, Disp: , Rfl:   •  pantoprazole (PROTONIX) 40 MG EC tablet, Take 1 tablet by mouth Daily., Disp: 90 tablet, Rfl: 3  •  vitamin B-12 (CYANOCOBALAMIN) 1000 MCG tablet, Take 1,000 mcg by mouth Daily., Disp: , Rfl:   Social History     Socioeconomic History   • Marital status:      Spouse name: Not on file   • Number of children: Not on file   • Years of education: Not on file   • Highest education level: Not on file   Tobacco Use   • Smoking status: Former Smoker   • Smokeless tobacco: Never Used   Substance and Sexual Activity   • Alcohol use: Yes     Comment: occ   • Drug use: Never   • Sexual activity: Never      No Known Allergies  Review of Systems   Constitution: Negative for chills, fever and malaise/fatigue.   HENT: Negative for congestion and hearing loss.    Eyes: Negative for double vision and visual disturbance.   Cardiovascular: Positive for dyspnea on exertion and leg swelling. Negative for chest pain, claudication, palpitations and syncope.   Respiratory: Positive for shortness of breath. Negative for cough.    Endocrine: Negative for cold intolerance.   Skin: Negative for color change and rash.   Musculoskeletal: Negative for arthritis and joint pain.   Gastrointestinal: Negative for abdominal pain and heartburn.   Genitourinary: Negative for hematuria.   Neurological: Positive for dizziness and light-headedness. Negative for excessive daytime sleepiness and numbness.   Psychiatric/Behavioral: Negative for depression. The patient is not nervous/anxious.    All other systems reviewed and are negative.             Objective:     Physical Exam   Constitutional: He is oriented to person, place, and time. He appears well-developed and well-nourished. He is cooperative.   HENT:   Head: Normocephalic and atraumatic.   Mouth/Throat: Uvula is midline and oropharynx is clear and moist. No oral lesions.   Eyes: Conjunctivae are normal. No scleral icterus.   Neck: Trachea normal. Neck supple. No JVD present. Carotid bruit is not present. No thyromegaly present.   Cardiovascular: Normal rate, regular rhythm, S1 normal, S2 normal, normal heart sounds, intact distal pulses and normal pulses. PMI is not displaced. Exam reveals no gallop and no friction rub.   No murmur heard.  Pulmonary/Chest: Effort normal and breath sounds normal.   Abdominal: Soft. Bowel sounds are normal.   Musculoskeletal: Normal range of motion.   Neurological: He is alert and oriented to person, place, and time. He has normal strength.   No focal deficits   Skin: Skin is warm. No cyanosis.   Psychiatric: He has a normal mood and affect.        Procedures    Lab Review:       Assessment:          Diagnosis Plan   1. Mixed hyperlipidemia     2. Hypertension, benign     3. Presence of cardiac pacemaker     4. Sleep apnea, unspecified type     5. Type 1 diabetes mellitus with hyperglycemia (CMS/[facility])     6. Dizziness            Plan:       Dyslipidemia well controlled  Hypertension mostly stable  Status post permanent pacemaker placement working well  Needs aggressive control of diabetes sleep apnea  Transient dizziness with mild hypotension probably vasovagal currently stable  Will review the echo and Myoview results

## 2020-08-21 RX ORDER — ATORVASTATIN CALCIUM 40 MG/1
TABLET, FILM COATED ORAL
Qty: 90 TABLET | Refills: 0 | Status: SHIPPED | OUTPATIENT
Start: 2020-08-21 | End: 2020-11-06

## 2020-08-21 RX ORDER — PANTOPRAZOLE SODIUM 40 MG/1
TABLET, DELAYED RELEASE ORAL
Qty: 90 TABLET | Refills: 0 | Status: SHIPPED | OUTPATIENT
Start: 2020-08-21 | End: 2020-11-06

## 2020-08-21 RX ORDER — ALFUZOSIN HYDROCHLORIDE 10 MG/1
TABLET, EXTENDED RELEASE ORAL
Qty: 90 TABLET | Refills: 0 | Status: SHIPPED | OUTPATIENT
Start: 2020-08-21 | End: 2020-11-06

## 2020-11-06 RX ORDER — ATORVASTATIN CALCIUM 40 MG/1
TABLET, FILM COATED ORAL
Qty: 90 TABLET | Refills: 0 | Status: SHIPPED | OUTPATIENT
Start: 2020-11-06 | End: 2021-01-11

## 2020-11-06 RX ORDER — PANTOPRAZOLE SODIUM 40 MG/1
TABLET, DELAYED RELEASE ORAL
Qty: 90 TABLET | Refills: 0 | Status: SHIPPED | OUTPATIENT
Start: 2020-11-06 | End: 2021-01-11

## 2020-11-06 RX ORDER — ALFUZOSIN HYDROCHLORIDE 10 MG/1
TABLET, EXTENDED RELEASE ORAL
Qty: 90 TABLET | Refills: 0 | Status: SHIPPED | OUTPATIENT
Start: 2020-11-06 | End: 2021-01-11

## 2020-12-07 ENCOUNTER — LAB (OUTPATIENT)
Dept: LAB | Facility: HOSPITAL | Age: 75
End: 2020-12-07

## 2020-12-07 DIAGNOSIS — E10.65 TYPE 1 DIABETES MELLITUS WITH HYPERGLYCEMIA (HCC): ICD-10-CM

## 2020-12-07 DIAGNOSIS — R94.6 ABNORMAL THYROID FUNCTION TEST: ICD-10-CM

## 2020-12-07 DIAGNOSIS — I10 HYPERTENSION, BENIGN: ICD-10-CM

## 2020-12-07 DIAGNOSIS — E78.2 MIXED HYPERLIPIDEMIA: ICD-10-CM

## 2020-12-07 LAB
ALBUMIN SERPL-MCNC: 4.9 G/DL (ref 3.5–5.2)
ALBUMIN UR-MCNC: 3 MG/DL
ALBUMIN/GLOB SERPL: 2.2 G/DL
ALP SERPL-CCNC: 68 U/L (ref 39–117)
ALT SERPL W P-5'-P-CCNC: 24 U/L (ref 1–41)
ANION GAP SERPL CALCULATED.3IONS-SCNC: 8 MMOL/L (ref 5–15)
AST SERPL-CCNC: 23 U/L (ref 1–40)
BILIRUB SERPL-MCNC: 0.5 MG/DL (ref 0–1.2)
BUN SERPL-MCNC: 19 MG/DL (ref 8–23)
BUN/CREAT SERPL: 18.4 (ref 7–25)
CALCIUM SPEC-SCNC: 9.9 MG/DL (ref 8.6–10.5)
CHLORIDE SERPL-SCNC: 100 MMOL/L (ref 98–107)
CHOLEST SERPL-MCNC: 146 MG/DL (ref 0–200)
CO2 SERPL-SCNC: 29 MMOL/L (ref 22–29)
CREAT SERPL-MCNC: 1.03 MG/DL (ref 0.76–1.27)
CREAT UR-MCNC: 164.4 MG/DL
GFR SERPL CREATININE-BSD FRML MDRD: 71 ML/MIN/1.73
GLOBULIN UR ELPH-MCNC: 2.2 GM/DL
GLUCOSE SERPL-MCNC: 103 MG/DL (ref 65–99)
HBA1C MFR BLD: 6.5 % (ref 3.5–5.6)
HDLC SERPL-MCNC: 52 MG/DL (ref 40–60)
LDLC SERPL CALC-MCNC: 79 MG/DL (ref 0–100)
LDLC/HDLC SERPL: 1.51 {RATIO}
MICROALBUMIN/CREAT UR: 18.2 MG/G
POTASSIUM SERPL-SCNC: 4.9 MMOL/L (ref 3.5–5.2)
PROT SERPL-MCNC: 7.1 G/DL (ref 6–8.5)
SODIUM SERPL-SCNC: 137 MMOL/L (ref 136–145)
T4 FREE SERPL-MCNC: 0.9 NG/DL (ref 0.93–1.7)
TRIGL SERPL-MCNC: 77 MG/DL (ref 0–150)
TSH SERPL DL<=0.05 MIU/L-ACNC: 8.15 UIU/ML (ref 0.27–4.2)
VLDLC SERPL-MCNC: 15 MG/DL (ref 5–40)

## 2020-12-07 PROCEDURE — 80053 COMPREHEN METABOLIC PANEL: CPT

## 2020-12-07 PROCEDURE — 80061 LIPID PANEL: CPT

## 2020-12-07 PROCEDURE — 83036 HEMOGLOBIN GLYCOSYLATED A1C: CPT

## 2020-12-07 PROCEDURE — 84443 ASSAY THYROID STIM HORMONE: CPT

## 2020-12-07 PROCEDURE — 82570 ASSAY OF URINE CREATININE: CPT

## 2020-12-07 PROCEDURE — 82043 UR ALBUMIN QUANTITATIVE: CPT

## 2020-12-07 PROCEDURE — 84439 ASSAY OF FREE THYROXINE: CPT

## 2020-12-07 PROCEDURE — 36415 COLL VENOUS BLD VENIPUNCTURE: CPT

## 2020-12-14 ENCOUNTER — OFFICE VISIT (OUTPATIENT)
Dept: ENDOCRINOLOGY | Facility: CLINIC | Age: 75
End: 2020-12-14

## 2020-12-14 VITALS
HEART RATE: 78 BPM | WEIGHT: 210 LBS | OXYGEN SATURATION: 98 % | SYSTOLIC BLOOD PRESSURE: 110 MMHG | HEIGHT: 72 IN | DIASTOLIC BLOOD PRESSURE: 70 MMHG | BODY MASS INDEX: 28.44 KG/M2 | TEMPERATURE: 97.1 F

## 2020-12-14 DIAGNOSIS — R94.6 ABNORMAL THYROID FUNCTION TEST: ICD-10-CM

## 2020-12-14 DIAGNOSIS — E78.2 MIXED HYPERLIPIDEMIA: ICD-10-CM

## 2020-12-14 DIAGNOSIS — I10 HYPERTENSION, BENIGN: ICD-10-CM

## 2020-12-14 DIAGNOSIS — E10.65 TYPE 1 DIABETES MELLITUS WITH HYPERGLYCEMIA (HCC): Primary | ICD-10-CM

## 2020-12-14 LAB — GLUCOSE BLDC GLUCOMTR-MCNC: 101 MG/DL (ref 70–105)

## 2020-12-14 PROCEDURE — 82962 GLUCOSE BLOOD TEST: CPT | Performed by: INTERNAL MEDICINE

## 2020-12-14 PROCEDURE — 99214 OFFICE O/P EST MOD 30 MIN: CPT | Performed by: INTERNAL MEDICINE

## 2020-12-14 RX ORDER — INFLUENZA A VIRUS A/MICHIGAN/45/2015 X-275 (H1N1) ANTIGEN (FORMALDEHYDE INACTIVATED), INFLUENZA A VIRUS A/SINGAPORE/INFIMH-16-0019/2016 IVR-186 (H3N2) ANTIGEN (FORMALDEHYDE INACTIVATED), INFLUENZA B VIRUS B/PHUKET/3073/2013 ANTIGEN (FORMALDEHYDE INACTIVATED), AND INFLUENZA B VIRUS B/MARYLAND/15/2016 BX-69A ANTIGEN (FORMALDEHYDE INACTIVATED) 60; 60; 60; 60 UG/.7ML; UG/.7ML; UG/.7ML; UG/.7ML
INJECTION, SUSPENSION INTRAMUSCULAR
COMMUNITY
Start: 2020-11-04 | End: 2020-12-14

## 2020-12-14 NOTE — PROGRESS NOTES
Endocrine Progress Note Outpatient     Patient Care Team:  Tammy Rodas MD as PCP - General (Family Medicine)  Rodger Limon MD as Consulting Physician (Cardiology)    Chief Complaint: Follow-up type 1 diabetes    HPI: 75-year-old male with history of type 1 diabetes, hypertension, hyperlipidemia abnormal thyroid tests is here for follow-up.  He has high mario 65 antibodies been mid normal C-peptide therefore he is a still on metformin 500 twice a day along with Lantus 5 units subcu daily.  Blood sugars at home are doing very well.  I reviewed his records and are mostly less than 120.  He checks his blood sugars twice a day. He does not have low blood sugars of less than 70.      Hypertension: Well-controlled    Hyperlipidemia: On simvastatin.    Does have high  TSH however he denies any dry skin, hair loss, constipation.  He tells me that if he is sitting he might doze off, denies fatigue and tiredness.  He is not on any thyroid medications at this time.    Past Medical History:   Diagnosis Date   • Diabetes mellitus type I (CMS/[facility])    • Hyperlipidemia    • Hypertension    • Skin cancer        Social History     Socioeconomic History   • Marital status:      Spouse name: Not on file   • Number of children: Not on file   • Years of education: Not on file   • Highest education level: Not on file   Tobacco Use   • Smoking status: Former Smoker   • Smokeless tobacco: Never Used   Substance and Sexual Activity   • Alcohol use: Yes     Comment: occ   • Drug use: Never   • Sexual activity: Never       Family History   Problem Relation Age of Onset   • Stroke Mother    • Heart attack Sister    • Cancer Sister         colon   • Cancer Brother         lung   • Alcohol abuse Brother        No Known Allergies    ROS:   Constitutional:  Denies fatigue, tiredness.    Eyes:  Denies change in visual acuity   HENT:  Denies nasal congestion or sore throat   Respiratory: denies cough, shortness of breath.    Cardiovascular:  denies chest pain, edema   GI:  Denies abdominal pain, nausea, vomiting.   Musculoskeletal:  Denies back pain or joint pain   Integument:  Denies dry skin and rash   Neurologic:  Denies headache, focal weakness or sensory changes   Endocrine:  Denies polyuria or polydipsia   Psychiatric:  Denies depression or anxiety      Vitals:    12/14/20 0829   BP: 110/70   Pulse: 78   Temp: 97.1 °F (36.2 °C)   SpO2: 98%       Physical Exam:  GEN: NAD, conversant  EYES: EOMI, PERRL, no conjunctival erythema  NECK: no thyromegaly, full ROM   CV: RRR, no murmurs/rubs/gallops, no peripheral edema  LUNG: CTAB, no wheezes/rales/ronchi  SKIN: no rashes, no acanthosis  MSK: no deformities, full ROM of all extremities  NEURO: no tremors, DTR normal  PSYCH: AOX3, appropriate mood, affect normal  FEET: No ulcer or calluses. 10 gm monofilament intact on both feet.     Results Review:     I reviewed the patient's new clinical results.    Lab Results   Component Value Date    HGBA1C 6.5 (H) 12/07/2020    HGBA1C 6.3 (H) 06/10/2020    HGBA1C 6.5 (H) 12/13/2019      Lab Results   Component Value Date    GLUCOSE 103 (H) 12/07/2020    BUN 19 12/07/2020    CREATININE 1.03 12/07/2020    EGFRIFNONA 71 12/07/2020    BCR 18.4 12/07/2020    K 4.9 12/07/2020    CO2 29.0 12/07/2020    CALCIUM 9.9 12/07/2020    ALBUMIN 4.90 12/07/2020    LABIL2 1.6 05/14/2019    AST 23 12/07/2020    ALT 24 12/07/2020    CHOL 146 12/07/2020    TRIG 77 12/07/2020    LDL 79 12/07/2020    HDL 52 12/07/2020     Lab Results   Component Value Date    TSH 8.150 (H) 12/07/2020    FREET4 0.90 (L) 12/07/2020         Medication Review: Reviewed.       Current Outpatient Medications:   •  ACCU-CHEK FASTCLIX LANCETS misc, Use to check blood sugar twice daily, Disp: , Rfl:   •  alfuzosin (UROXATRAL) 10 MG 24 hr tablet, TAKE 1 TABLET EVERY DAY, Disp: 90 tablet, Rfl: 0  •  aspirin 81 MG tablet, ASPIRIN 81 MG ORAL TABLET, Disp: , Rfl:   •  atorvastatin (LIPITOR) 40 MG  tablet, TAKE 1 TABLET EVERY DAY, Disp: 90 tablet, Rfl: 0  •  cetirizine (zyrTEC) 10 MG tablet, , Disp: , Rfl:   •  fluticasone (FLONASE) 50 MCG/ACT nasal spray, As Needed., Disp: , Rfl:   •  glucose blood (ACCU-CHEK GUIDE) test strip, Use to check blood sugar twice daily, Disp: , Rfl:   •  Insulin Glargine (LANTUS SOLOSTAR) 100 UNIT/ML injection pen, LANTUS SOLOSTAR 100 UNIT/ML SOPN, Disp: , Rfl:   •  Insulin Pen Needle (B-D ULTRAFINE III SHORT PEN) 31G X 8 MM misc, BD PEN NEEDLE SHORT U/F 31G X 8 MM, Disp: , Rfl:   •  metFORMIN (GLUCOPHAGE) 500 MG tablet, Take 500 mg by mouth 2 (Two) Times a Day With Meals., Disp: , Rfl:   •  Misc Natural Products (ALLERGY RELEAF SYSTEM PO), Take  by mouth., Disp: , Rfl:   •  montelukast (SINGULAIR) 10 MG tablet, TAKE 1 TABLET EVERY DAY, Disp: 90 tablet, Rfl: 3  •  Multiple Vitamins-Minerals (MULTI VITAMIN/MINERALS) tablet, MULTI VITAMIN/MINERALS TABS, Disp: , Rfl:   •  pantoprazole (PROTONIX) 40 MG EC tablet, TAKE 1 TABLET EVERY DAY, Disp: 90 tablet, Rfl: 0  •  vitamin B-12 (CYANOCOBALAMIN) 1000 MCG tablet, Take 1,000 mcg by mouth Daily., Disp: , Rfl:       Assessment/Plan   1. Diabetes mellitus type 1: Excellent control, continue current medications.  Always keep glucose source in case of low blood sugar also advised to get regular eye exam and flu vaccine.    2. Hypertension: He is currently not on any blood pressure medications.  Advised to watch blood pressure at home and decrease salt intake    3. Hyperlipidemia: Much better with LDL now at 79 and triglycerides 70.  He is currently on atorvastatin.  Will continue that.      4.  Abnormal thyroid test: He does have mild high TSH, TPO antibodies are negative and he is relatively asymptomatic.  He would like to wait and watch for now and follow labs.  We did talk about replacing small dose of levothyroxine but as I mentioned at this time he would like to wait and watch.  He will call me if he develops any symptoms of  "hypothyroidism which he knows and includes excessive fatigue, dry skin, hair loss, constipation, unexplained weight gain or cold intolerance.  I will follow TSH and free T4.             Sania Mays MD FACE.        EMR Dragon / transcription disclaimer:     \"Dictated utilizing Dragon dictation\".                 "

## 2020-12-14 NOTE — PATIENT INSTRUCTIONS
Continue current medications  Continue to work on your diet and activity  Always keep glucose source in case of low blood sugar  Annual eye exam and flu vaccine  If you develop any excessive fatigue, dry skin, hair loss, constipation or cold intolerance then call me  Labs before follow-up.

## 2021-01-11 RX ORDER — ATORVASTATIN CALCIUM 40 MG/1
TABLET, FILM COATED ORAL
Qty: 90 TABLET | Refills: 0 | Status: SHIPPED | OUTPATIENT
Start: 2021-01-11 | End: 2021-03-18

## 2021-01-11 RX ORDER — ALFUZOSIN HYDROCHLORIDE 10 MG/1
TABLET, EXTENDED RELEASE ORAL
Qty: 90 TABLET | Refills: 0 | Status: SHIPPED | OUTPATIENT
Start: 2021-01-11 | End: 2021-03-18

## 2021-01-11 RX ORDER — PANTOPRAZOLE SODIUM 40 MG/1
TABLET, DELAYED RELEASE ORAL
Qty: 90 TABLET | Refills: 0 | Status: SHIPPED | OUTPATIENT
Start: 2021-01-11 | End: 2021-03-18

## 2021-01-11 NOTE — TELEPHONE ENCOUNTER
I refilled his medicines but he will need to be seen before any more refills.  If [facility] please ask him to bring them in.

## 2021-01-20 ENCOUNTER — LAB (OUTPATIENT)
Dept: LAB | Facility: HOSPITAL | Age: 76
End: 2021-01-20

## 2021-01-20 ENCOUNTER — OFFICE VISIT (OUTPATIENT)
Dept: FAMILY MEDICINE CLINIC | Facility: CLINIC | Age: 76
End: 2021-01-20

## 2021-01-20 VITALS
SYSTOLIC BLOOD PRESSURE: 148 MMHG | BODY MASS INDEX: 29.54 KG/M2 | HEIGHT: 71 IN | DIASTOLIC BLOOD PRESSURE: 77 MMHG | TEMPERATURE: 97.3 F | HEART RATE: 82 BPM | WEIGHT: 211 LBS | OXYGEN SATURATION: 97 %

## 2021-01-20 DIAGNOSIS — Z00.00 MEDICARE ANNUAL WELLNESS VISIT, SUBSEQUENT: Primary | ICD-10-CM

## 2021-01-20 DIAGNOSIS — Z12.5 SCREENING FOR PROSTATE CANCER: ICD-10-CM

## 2021-01-20 DIAGNOSIS — Z23 NEED FOR VACCINATION: ICD-10-CM

## 2021-01-20 PROBLEM — R68.89 OTHER GENERAL SYMPTOMS AND SIGNS: Status: RESOLVED | Noted: 2018-11-19 | Resolved: 2021-01-20

## 2021-01-20 LAB — PSA SERPL-MCNC: 4.31 NG/ML (ref 0–4)

## 2021-01-20 PROCEDURE — 1170F FXNL STATUS ASSESSED: CPT | Performed by: FAMILY MEDICINE

## 2021-01-20 PROCEDURE — G0103 PSA SCREENING: HCPCS | Performed by: FAMILY MEDICINE

## 2021-01-20 PROCEDURE — G0009 ADMIN PNEUMOCOCCAL VACCINE: HCPCS | Performed by: FAMILY MEDICINE

## 2021-01-20 PROCEDURE — 96160 PT-FOCUSED HLTH RISK ASSMT: CPT | Performed by: FAMILY MEDICINE

## 2021-01-20 PROCEDURE — G0439 PPPS, SUBSEQ VISIT: HCPCS | Performed by: FAMILY MEDICINE

## 2021-01-20 PROCEDURE — 90670 PCV13 VACCINE IM: CPT | Performed by: FAMILY MEDICINE

## 2021-01-20 PROCEDURE — 1160F RVW MEDS BY RX/DR IN RCRD: CPT | Performed by: FAMILY MEDICINE

## 2021-01-20 PROCEDURE — 36415 COLL VENOUS BLD VENIPUNCTURE: CPT | Performed by: FAMILY MEDICINE

## 2021-01-20 NOTE — PATIENT INSTRUCTIONS
Medicare Wellness  Personal Prevention Plan of Service     Date of Office Visit:  2021  Encounter Provider:  Tammy Rodas MD  Place of Service:  [facility]  Patient Name: Floyd Herrera  :  1945    As part of the Medicare Wellness portion of your visit today, we are providing you with this personalized preventive plan of services (PPPS). This plan is based upon recommendations of the [place] Preventive Services Task Force (USPSTF) and the Advisory Committee on Immunization Practices (ACIP).    This lists the preventive care services that should be considered, and provides dates of when you are due. Items listed as completed are up-to-date and do not require any further intervention.    Health Maintenance   Topic Date Due   • ZOSTER VACCINE (1 of 2) 1997   • Pneumococcal Vaccine 65+ (1 of 1 - PPSV23) 2012   • HEPATITIS C SCREENING  2019   • DIABETIC FOOT EXAM  2019   • DIABETIC EYE EXAM  2019   • HEMOGLOBIN A1C  2021   • LIPID PANEL  2021   • URINE MICROALBUMIN  2021   • ANNUAL WELLNESS VISIT  2022   • TDAP/TD VACCINES (2 - Td) 2024   • COLONOSCOPY  2027   • INFLUENZA VACCINE  Completed   • AAA SCREEN (ONE-TIME)  Completed   • MENINGOCOCCAL VACCINE  Aged Out       Orders Placed This Encounter   Procedures   • PSA Screen       No follow-ups on file.          Advance Directive    Advance directives are legal documents that let you make choices ahead of time about your health care and medical treatment in case you become unable to communicate for yourself. Advance directives are a way for you to make known your wishes to family, friends, and health care providers. This can let others know about your end-of-life care if you become unable to communicate.  Discussing and writing advance directives should happen over time rather than all at once. Advance directives can be changed depending on  your situation and what you want, even after you have signed the advance directives.  There are different types of advance directives, such as:  · Medical power of .  · Living will.  · Do not resuscitate (DNR) or do not attempt resuscitation (DNAR) order.  Health care proxy and medical power of   A health care proxy is also called a health care agent. This is a person who is appointed to make medical decisions for you in cases where you are unable to make the decisions yourself. Generally, people choose someone they know well and trust to represent their preferences. Make sure to ask this person for an agreement to act as your proxy. A proxy may have to exercise judgment in the event of a medical decision for which your wishes are not known.  A medical power of  is a legal document that names your health care proxy. Depending on the laws in your state, after the document is written, it may also need to be:  · Signed.  · Notarized.  · Dated.  · Copied.  · Witnessed.  · Incorporated into your medical record.  You may also want to appoint someone to manage your money in a situation in which you are unable to do so. This is called a durable power of  for finances. It is a separate legal document from the durable power of  for health care. You may choose the same person or someone different from your health care proxy to act as your agent in money matters.  If you do not appoint a proxy, or if there is a concern that the proxy is not acting in your best interests, a court may appoint a guardian to act on your behalf.  Living will  A living will is a set of instructions that state your wishes about medical care when you cannot express them yourself. Health care providers should keep a copy of your living will in your medical record. You may want to give a copy to family members or friends. To alert caregivers in case of an emergency, you can place a card in your wallet to let them  know that you have a living will and where they can find it. A living will is used if you become:  · Terminally ill.  · Disabled.  · Unable to communicate or make decisions.  Items to consider in your living will include:  · To use or not to use life-support equipment, such as dialysis machines and breathing machines (ventilators).  · A DNR or DNAR order. This tells health care providers not to use cardiopulmonary resuscitation (CPR) if breathing or heartbeat stops.  · To use or not to use tube feeding.  · To be given or not to be given food and fluids.  · Comfort (palliative) care when the goal becomes comfort rather than a cure.  · Donation of organs and tissues.  A living will does not give instructions for distributing your money and property if you should pass away.  DNR or DNAR  A DNR or DNAR order is a request not to have CPR in the event that your heart stops beating or you stop breathing. If a DNR or DNAR order has not been made and shared, a health care provider will try to help any patient whose heart has stopped or who has stopped breathing. If you plan to have surgery, talk with your health care provider about how your DNR or DNAR order will be followed if problems occur.  What if I do not have an advance directive?  If you do not have an advance directive, some states assign family decision makers to act on your behalf based on how closely you are related to them. Each state has its own laws about advance directives. You may want to check with your health care provider, , or state representative about the laws in your state.  Summary  · Advance directives are the legal documents that allow you to make choices ahead of time about your health care and medical treatment in case you become unable to tell others about your care.  · The process of discussing and writing advance directives should happen over time. You can change the advance directives, even after you have signed them.  · Advance  directives include DNR or DNAR orders, living caballero, and designating an agent as your medical power of .  This information is not intended to replace advice given to you by your health care provider. Make sure you discuss any questions you have with your health care provider.  Document Revised: 07/16/2020 Document Reviewed: 07/16/2020  [employer]vier Patient Education © 2020 [employer]

## 2021-01-20 NOTE — PROGRESS NOTES
Medicare Wellness Visit   The ABC's of the Annual Wellness Visit    Chief Complaint   Patient presents with   • Medicare Wellness-subsequent     labs done in Dec       HPI:  Floyd Herrera, -1945, is a 75 y.o. male who presents for an Initial Medicare Wellness Visit.    Recent Hospitalizations:  No hospitalization(s) within the last year..    Current Medical Providers:  Patient Care Team:  Tammy Rodas MD as PCP - General (Family Medicine)  Rodger Limon MD as Consulting Physician (Cardiology)    Health Habits and Functional and Cognitive Screening and Depression Screening:  Functional & Cognitive Status 2021   Do you have difficulty preparing food and eating? No   Do you have difficulty bathing yourself, getting dressed or grooming yourself? No   Do you have difficulty using the toilet? No   Do you have difficulty moving around from place to place? No   Do you have trouble with steps or getting out of a bed or a chair? No   Current Diet Diabetic Diet   Dental Exam Up to date   Eye Exam Up to date   Exercise (times per week) 2 times per week   Current Exercises Include Yard Work   Current Exercise Activities Include -   Do you need help using the phone?  No   Are you deaf or do you have serious difficulty hearing?  No   Do you need help with transportation? No   Do you need help shopping? No   Do you need help preparing meals?  No   Do you need help with housework?  No   Do you need help with laundry? No   Do you need help taking your medications? No   Do you need help managing money? -   Do you ever drive or ride in a car without wearing a seat belt? No   Have you felt unusual stress, anger or loneliness in the last month? No   Who do you live with? Spouse   If you need help, do you have trouble finding someone available to you? No   Have you been bothered in the last four weeks by sexual problems? -   Do you have difficulty concentrating, remembering or making decisions? No        Compared to one year ago, the patient feels his physical health is the same and his mental health is the same.    Depression Screen:  PHQ-2/PHQ-9 Depression Screening 1/20/2021   Little interest or pleasure in doing things 0   Feeling down, depressed, or hopeless 0   Trouble falling or staying asleep, or sleeping too much -   Feeling tired or having little energy -   Poor appetite or overeating -   Feeling bad about yourself - or that you are a failure or have let yourself or your family down -   Trouble concentrating on things, such as reading the newspaper or watching television -   Moving or speaking so slowly that other people could have noticed. Or the opposite - being so fidgety or restless that you have been moving around a lot more than usual -   Thoughts that you would be better off dead, or of hurting yourself in some way -   Total Score 0   If you checked off any problems, how difficult have these problems made it for you to do your work, take care of things at home, or get along with other people? -         Past Medical/Family/Social History:  The following portions of the patient's history were reviewed and updated as appropriate: allergies, current medications, past family history, past medical history, past social history, past surgical history and problem list.    No Known Allergies      Current Outpatient Medications:   •  ACCU-CHEK FASTCLIX LANCETS misc, Use to check blood sugar twice daily, Disp: , Rfl:   •  alfuzosin (UROXATRAL) 10 MG 24 hr tablet, TAKE 1 TABLET EVERY DAY, Disp: 90 tablet, Rfl: 0  •  aspirin 81 MG tablet, ASPIRIN 81 MG ORAL TABLET, Disp: , Rfl:   •  atorvastatin (LIPITOR) 40 MG tablet, TAKE 1 TABLET EVERY DAY, Disp: 90 tablet, Rfl: 0  •  cetirizine (zyrTEC) 10 MG tablet, , Disp: , Rfl:   •  fluticasone (FLONASE) 50 MCG/ACT nasal spray, As Needed., Disp: , Rfl:   •  glucose blood (ACCU-CHEK GUIDE) test strip, Use to check blood sugar twice daily, Disp: , Rfl:   •  Insulin  Glargine (LANTUS SOLOSTAR) 100 UNIT/ML injection pen, LANTUS SOLOSTAR 100 UNIT/ML SOPN, Disp: , Rfl:   •  Insulin Pen Needle (B-D ULTRAFINE III SHORT PEN) 31G X 8 MM misc, BD PEN NEEDLE SHORT U/F 31G X 8 MM, Disp: , Rfl:   •  metFORMIN (GLUCOPHAGE) 500 MG tablet, Take 500 mg by mouth 2 (Two) Times a Day With Meals., Disp: , Rfl:   •  Misc Natural Products (ALLERGY RELEAF SYSTEM PO), Take  by mouth., Disp: , Rfl:   •  montelukast (SINGULAIR) 10 MG tablet, TAKE 1 TABLET EVERY DAY, Disp: 90 tablet, Rfl: 3  •  Multiple Vitamins-Minerals (MULTI VITAMIN/MINERALS) tablet, MULTI VITAMIN/MINERALS TABS, Disp: , Rfl:   •  pantoprazole (PROTONIX) 40 MG EC tablet, TAKE 1 TABLET EVERY DAY, Disp: 90 tablet, Rfl: 0  •  vitamin B-12 (CYANOCOBALAMIN) 1000 MCG tablet, Take 1,000 mcg by mouth Daily., Disp: , Rfl:   No current facility-administered medications for this visit.     Aspirin use counseling: Taking ASA appropriately as indicated    Current medication list contains no high risk medications.  No harmful drug interactions have been identified.     Family History   Problem Relation Age of Onset   • Stroke Mother    • Heart attack Sister    • Cancer Sister         colon   • Cancer Brother         lung   • Alcohol abuse Brother        Social History     Tobacco Use   • Smoking status: Former Smoker   • Smokeless tobacco: Never Used   Substance Use Topics   • Alcohol use: Yes     Comment: occ       Past Surgical History:   Procedure Laterality Date   • BACK SURGERY     • CARDIOVASCULAR STRESS TEST      Medications induced sress test   • CARPAL TUNNEL RELEASE     • CATARACT EXTRACTION     • COLONOSCOPY  09/29/2017   • GALLBLADDER SURGERY     • HERNIA REPAIR     • KNEE SURGERY         Patient Active Problem List   Diagnosis   • Acquired hallux rigidus   • Benign prostatic hyperplasia with urinary obstruction   • Encounter for immunization   • Medicare annual wellness visit, subsequent   • Mixed hyperlipidemia   • Hypertension, benign  "  • Hypogonadism   • Impaired fasting glucose   • Need for prophylactic vaccination with combined diphtheria-tetanus-pertussis (DTP) vaccine   • Osteoarthritis   • Presence of cardiac pacemaker   • Sick sinus syndrome (CMS/HCC)   • Reactive airway disease   • Sleep apnea   • Type 1 diabetes mellitus with hyperglycemia (CMS/HCC)   • Abnormal thyroid function test   • Gastroesophageal reflux disease without esophagitis   • Dizziness       Review of Systems   Constitutional: Negative for chills and fever.   HENT: Negative for sinus pressure, sore throat and swollen glands.    Eyes: Negative for blurred vision.   Respiratory: Negative for cough, shortness of breath and wheezing.    Cardiovascular: Negative for chest pain and palpitations.   Gastrointestinal: Negative for abdominal pain.   Endocrine: Negative for polyuria.   Genitourinary: Negative for difficulty urinating.   Skin: Negative for rash.   Neurological: Negative for dizziness, seizures and headache.   Hematological: Negative for adenopathy.   Psychiatric/Behavioral: Negative for depressed mood.       Objective     Vitals:    01/20/21 0832   BP: 148/77   BP Location: Right arm   Patient Position: Sitting   Cuff Size: Adult   Pulse: 82   Temp: 97.3 °F (36.3 °C)   SpO2: 97%   Weight: 95.7 kg (211 lb)   Height: 179.7 cm (70.75\")       Patient's Body mass index is 29.64 kg/m². BMI is above normal parameters. Recommendations include: exercise counseling.      No exam data present    The patient has no evidence of cognitve impairment.     Physical Exam  Constitutional:       General: He is not in acute distress.     Appearance: He is well-developed.   HENT:      Head: Normocephalic.   Eyes:      General: Lids are normal.      Conjunctiva/sclera: Conjunctivae normal.   Neck:      Musculoskeletal: Normal range of motion.      Thyroid: No thyroid mass or thyromegaly.      Trachea: Trachea normal.   Cardiovascular:      Rate and Rhythm: Normal rate and regular rhythm. "      Heart sounds: Murmur present.   Pulmonary:      Effort: Pulmonary effort is normal.      Breath sounds: Normal breath sounds.   Abdominal:      Palpations: Abdomen is soft.   Lymphadenopathy:      Cervical: No cervical adenopathy.   Skin:     General: Skin is warm and dry.   Neurological:      Mental Status: He is alert and oriented to person, place, and time.   Psychiatric:         Attention and Perception: He is attentive.         Mood and Affect: Mood normal.         Speech: Speech normal.         Behavior: Behavior normal.         Recent Lab Results:     Lab Results   Component Value Date    CHOL 146 12/07/2020    TRIG 77 12/07/2020    HDL 52 12/07/2020    VLDL 15 12/07/2020    LDLHDL 1.51 12/07/2020     Office Visit on 01/20/2021   Component Date Value Ref Range Status   • PSA 01/20/2021 4.310* 0.000 - 4.000 ng/mL Final         Assessment/Plan   Age-appropriate Screening Schedule:  Refer to the list below for future screening recommendations based on patient's age, sex and/or medical conditions.      Health Maintenance   Topic Date Due   • ZOSTER VACCINE (1 of 2) 09/27/1997   • DIABETIC FOOT EXAM  08/14/2019   • DIABETIC EYE EXAM  12/01/2019   • HEMOGLOBIN A1C  06/07/2021   • LIPID PANEL  12/07/2021   • URINE MICROALBUMIN  12/07/2021   • TDAP/TD VACCINES (2 - Td) 11/05/2024   • COLONOSCOPY  09/29/2027   • INFLUENZA VACCINE  Completed       Medicare Risks and Personalized Health Plan:  Advance Directive Discussion  Immunizations Discussed/Encouraged (specific immunizations; Prevnar )  Prostate Cancer Screening       CMS-Preventive Services Quick Reference  Medicare Preventive Services Addressed:  Annual Wellness Visit (AWV)    Advance Care Planning:  ACP discussion was held with the patient during this visit. Patient has an advance directive in EMR which is still valid.     Diagnoses and all orders for this visit:    1. Medicare annual wellness visit, subsequent (Primary)    2. Screening for prostate  cancer  -     PSA Screen    3. Need for vaccination  -     pneumococcal conj. 13-valent (PREVNAR-13) vaccine 0.5 mL        An After Visit Summary and PPPS with all of these plans were given to the patient.      Follow Up:  Return in about 1 year (around 1/20/2022) for Medicare Wellness.

## 2021-03-15 ENCOUNTER — TELEPHONE (OUTPATIENT)
Dept: FAMILY MEDICINE CLINIC | Facility: CLINIC | Age: 76
End: 2021-03-15

## 2021-03-15 NOTE — TELEPHONE ENCOUNTER
"PATIENT IS CALLING IN HE STATES THE THINKS HE MIGHT HAVE PULLED MUSCLE BACK BY \"FLOATING RIB\" HE IS WANTING TO GET SOME ADVICE ON WHAT TO DO. WOULD LIKE CALLBACK TO DISCUSS.    CALLBACK NUMBER IS  [phone]  "

## 2021-03-18 RX ORDER — ATORVASTATIN CALCIUM 40 MG/1
TABLET, FILM COATED ORAL
Qty: 90 TABLET | Refills: 0 | Status: SHIPPED | OUTPATIENT
Start: 2021-03-18 | End: 2021-05-25

## 2021-03-18 RX ORDER — ALFUZOSIN HYDROCHLORIDE 10 MG/1
TABLET, EXTENDED RELEASE ORAL
Qty: 90 TABLET | Refills: 0 | Status: SHIPPED | OUTPATIENT
Start: 2021-03-18 | End: 2021-05-25

## 2021-03-18 RX ORDER — PANTOPRAZOLE SODIUM 40 MG/1
TABLET, DELAYED RELEASE ORAL
Qty: 90 TABLET | Refills: 0 | Status: SHIPPED | OUTPATIENT
Start: 2021-03-18 | End: 2021-05-25

## 2021-03-22 ENCOUNTER — CLINICAL SUPPORT NO REQUIREMENTS (OUTPATIENT)
Dept: CARDIOLOGY | Facility: CLINIC | Age: 76
End: 2021-03-22

## 2021-03-22 ENCOUNTER — OFFICE VISIT (OUTPATIENT)
Dept: CARDIOLOGY | Facility: CLINIC | Age: 76
End: 2021-03-22

## 2021-03-22 VITALS — TEMPERATURE: 96.8 F | HEIGHT: 71 IN | BODY MASS INDEX: 29.82 KG/M2 | WEIGHT: 213 LBS

## 2021-03-22 DIAGNOSIS — G47.30 SLEEP APNEA, UNSPECIFIED TYPE: ICD-10-CM

## 2021-03-22 DIAGNOSIS — E78.2 MIXED HYPERLIPIDEMIA: Primary | ICD-10-CM

## 2021-03-22 DIAGNOSIS — I49.5 SICK SINUS SYNDROME (HCC): ICD-10-CM

## 2021-03-22 DIAGNOSIS — E10.65 TYPE 1 DIABETES MELLITUS WITH HYPERGLYCEMIA (HCC): ICD-10-CM

## 2021-03-22 DIAGNOSIS — Z95.0 PRESENCE OF CARDIAC PACEMAKER: Primary | ICD-10-CM

## 2021-03-22 DIAGNOSIS — I10 HYPERTENSION, BENIGN: ICD-10-CM

## 2021-03-22 DIAGNOSIS — Z95.0 PRESENCE OF CARDIAC PACEMAKER: ICD-10-CM

## 2021-03-22 PROCEDURE — 99214 OFFICE O/P EST MOD 30 MIN: CPT | Performed by: INTERNAL MEDICINE

## 2021-03-22 PROCEDURE — 93000 ELECTROCARDIOGRAM COMPLETE: CPT | Performed by: INTERNAL MEDICINE

## 2021-03-22 PROCEDURE — 93280 PM DEVICE PROGR EVAL DUAL: CPT | Performed by: INTERNAL MEDICINE

## 2021-03-22 RX ORDER — LEVOTHYROXINE SODIUM 0.05 MG/1
25 TABLET ORAL DAILY
COMMUNITY
End: 2021-08-30 | Stop reason: SDUPTHER

## 2021-03-22 NOTE — PROGRESS NOTES
"    Subjective:     Encounter Date:03/22/2021      Patient ID: Floyd Herrera is a 75 y.o. male.    Chief Complaint: 7 mo f/u for Hypertension & Hyperlipidemia  History of Present Illness     75 year-old white male patient with known history of hypertension diabetes dyslipidemia  status post permanent pacemaker placement   for sick sinus syndrome,  comes back for follow up.        Patient denies of any symptoms of chest pain or shortness of breath     [facility] regarding diabetes dyslipidemia and thyroid problems     I advised patient to follow-up with PCP regarding history of asthma obstructive sleep apnea and shortness of breath  Cardiac wise appears to be stable if he have any recurrent problems he will contact me  Patient pacemaker was interrogated EKG showed AV pacer rhythm no changes in the pacemaker battery life 7 months  So advised him to come back in 2 months for recheck  He does have mostly isolated PVCs but patient denies of any palpitations  If any symptoms suggest stress test in the future    The following portions of the patient's history were reviewed and updated as appropriate: Allergies current medications past family history past medical history past social history past surgical history problem list and review of systems  Past Medical History:   Diagnosis Date   • Diabetes mellitus type I (CMS/HCC)    • Hyperlipidemia    • Hypertension    • Skin cancer    • Thyroid disease      Past Surgical History:   Procedure Laterality Date   • BACK SURGERY     • CARDIOVASCULAR STRESS TEST      Medications induced sress test   • CARPAL TUNNEL RELEASE     • CATARACT EXTRACTION     • COLONOSCOPY  09/29/2017   • GALLBLADDER SURGERY     • HERNIA REPAIR     • KNEE SURGERY       Temp 96.8 °F (36 °C) (Infrared)   Ht 180.3 cm (71\")   Wt 96.6 kg (213 lb)   BMI 29.71 kg/m²   Family History   Problem Relation Age of Onset   • Stroke Mother    • Heart attack Sister    • Cancer " Sister         colon   • Cancer Brother         lung   • Alcohol abuse Brother    • No Known Problems Father    • No Known Problems Maternal Aunt    • No Known Problems Maternal Uncle    • No Known Problems Paternal Aunt    • No Known Problems Paternal Uncle    • No Known Problems Maternal Grandmother    • No Known Problems Maternal Grandfather    • No Known Problems Paternal Grandmother    • No Known Problems Paternal Grandfather    • No Known Problems Other    • Anemia Neg Hx    • Arrhythmia Neg Hx    • Asthma Neg Hx    • Clotting disorder Neg Hx    • Fainting Neg Hx    • Heart disease Neg Hx    • Heart failure Neg Hx    • Hyperlipidemia Neg Hx    • Hypertension Neg Hx        Current Outpatient Medications:   •  ACCU-CHEK FASTCLIX LANCETS misc, Use to check blood sugar twice daily, Disp: , Rfl:   •  alfuzosin (UROXATRAL) 10 MG 24 hr tablet, TAKE 1 TABLET EVERY DAY, Disp: 90 tablet, Rfl: 0  •  aspirin 81 MG tablet, ASPIRIN 81 MG ORAL TABLET, Disp: , Rfl:   •  atorvastatin (LIPITOR) 40 MG tablet, TAKE 1 TABLET EVERY DAY, Disp: 90 tablet, Rfl: 0  •  cetirizine (zyrTEC) 10 MG tablet, , Disp: , Rfl:   •  fluticasone (FLONASE) 50 MCG/ACT nasal spray, As Needed., Disp: , Rfl:   •  glucose blood (ACCU-CHEK GUIDE) test strip, Use to check blood sugar twice daily, Disp: , Rfl:   •  Insulin Glargine (LANTUS SOLOSTAR) 100 UNIT/ML injection pen, LANTUS SOLOSTAR 100 UNIT/ML SOPN, Disp: , Rfl:   •  Insulin Pen Needle (B-D ULTRAFINE III SHORT PEN) 31G X 8 MM misc, BD PEN NEEDLE SHORT U/F 31G X 8 MM, Disp: , Rfl:   •  levothyroxine (Synthroid) 50 MCG tablet, Take 25 mcg by mouth Daily., Disp: , Rfl:   •  metFORMIN (GLUCOPHAGE) 500 MG tablet, Take 500 mg by mouth 2 (Two) Times a Day With Meals., Disp: , Rfl:   •  montelukast (SINGULAIR) 10 MG tablet, TAKE 1 TABLET EVERY DAY, Disp: 90 tablet, Rfl: 3  •  Multiple Vitamins-Minerals (MULTI VITAMIN/MINERALS) tablet, MULTI VITAMIN/MINERALS TABS, Disp: , Rfl:   •  pantoprazole (PROTONIX)  40 MG EC tablet, TAKE 1 TABLET EVERY DAY, Disp: 90 tablet, Rfl: 0  •  vitamin B-12 (CYANOCOBALAMIN) 1000 MCG tablet, Take 1,000 mcg by mouth Daily., Disp: , Rfl:   Social History     Socioeconomic History   • Marital status:      Spouse name: Not on file   • Number of children: Not on file   • Years of education: Not on file   • Highest education level: Not on file   Tobacco Use   • Smoking status: Former Smoker   • Smokeless tobacco: Never Used   Vaping Use   • Vaping Use: Never used   Substance and Sexual Activity   • Alcohol use: Yes     Comment: occ   • Drug use: Never   • Sexual activity: Never     No Known Allergies  Review of Systems   Constitutional: Negative for fever and malaise/fatigue.   HENT: Negative for congestion and hearing loss.    Eyes: Negative for double vision and visual disturbance.   Cardiovascular: Negative for chest pain, claudication, dyspnea on exertion, leg swelling and syncope.   Respiratory: Negative for cough and shortness of breath.    Endocrine: Negative for cold intolerance.   Skin: Negative for color change and rash.   Musculoskeletal: Negative for arthritis and joint pain.   Gastrointestinal: Negative for abdominal pain and heartburn.   Genitourinary: Negative for hematuria.   Neurological: Positive for light-headedness. Negative for excessive daytime sleepiness and dizziness.   Psychiatric/Behavioral: Negative for depression. The patient is not nervous/anxious.    All other systems reviewed and are negative.             Objective:     Physical Exam     Vital stable neck no JVP elevation lungs bilateral entry mostly clear heart sounds S1-S2 regular extremities no edema bilateral pulses present equal          ECG 12 Lead    Date/Time: 3/22/2021 12:56 PM  Performed by: Rodger Limon MD  Authorized by: Rodger Limon MD   Comments: AV pacer rhythm no changes compared to the last EKG            Lab Review:       Assessment:          Diagnosis Plan    1. Mixed hyperlipidemia     2. Hypertension, benign     3. Presence of cardiac pacemaker     4. Sleep apnea, unspecified type     5. Type 1 diabetes mellitus with hyperglycemia (CMS/[facility])            Plan:       MDM  Number of Diagnoses or Management Options  Hypertension, benign: established, improving  Mixed hyperlipidemia: established, improving  Presence of cardiac pacemaker: established, improving  Sleep apnea, unspecified type: established, improving  Type 1 diabetes mellitus with hyperglycemia (CMS/[facility]): established, improving     Amount and/or Complexity of Data Reviewed  Clinical lab tests: ordered and reviewed  Review and summarize past medical records: yes    Risk of Complications, Morbidity, and/or Mortality  Presenting problems: moderate  Management options: moderate    Patient Progress  Patient progress: stable

## 2021-04-16 RX ORDER — MONTELUKAST SODIUM 10 MG/1
TABLET ORAL
Qty: 90 TABLET | Refills: 3 | Status: SHIPPED | OUTPATIENT
Start: 2021-04-16 | End: 2022-02-04

## 2021-05-24 ENCOUNTER — TELEPHONE (OUTPATIENT)
Dept: CARDIOLOGY | Facility: CLINIC | Age: 76
End: 2021-05-24

## 2021-05-24 NOTE — TELEPHONE ENCOUNTER
Called patient, his PM battery is at [facility], advised him scheduling will be calling him to set up an appointment for Dr Moss OV and device check. Please make it at a time Dr Camacho is here as well. Thanks

## 2021-05-25 RX ORDER — ALFUZOSIN HYDROCHLORIDE 10 MG/1
TABLET, EXTENDED RELEASE ORAL
Qty: 90 TABLET | Refills: 1 | Status: SHIPPED | OUTPATIENT
Start: 2021-05-25 | End: 2021-10-08

## 2021-05-25 RX ORDER — PANTOPRAZOLE SODIUM 40 MG/1
TABLET, DELAYED RELEASE ORAL
Qty: 90 TABLET | Refills: 1 | Status: SHIPPED | OUTPATIENT
Start: 2021-05-25 | End: 2021-10-08

## 2021-05-25 RX ORDER — ATORVASTATIN CALCIUM 40 MG/1
TABLET, FILM COATED ORAL
Qty: 90 TABLET | Refills: 1 | Status: SHIPPED | OUTPATIENT
Start: 2021-05-25 | End: 2021-10-08

## 2021-06-04 ENCOUNTER — HOSPITAL ENCOUNTER (EMERGENCY)
Facility: HOSPITAL | Age: 76
Discharge: HOME | End: 2021-06-04
Admitting: EMERGENCY MEDICINE

## 2021-06-04 ENCOUNTER — LAB (OUTPATIENT)
Dept: LAB | Facility: HOSPITAL | Age: 76
End: 2021-06-04

## 2021-06-04 ENCOUNTER — APPOINTMENT (OUTPATIENT)
Dept: GENERAL RADIOLOGY | Facility: HOSPITAL | Age: 76
End: 2021-06-04

## 2021-06-04 VITALS
HEIGHT: 72 IN | DIASTOLIC BLOOD PRESSURE: 55 MMHG | BODY MASS INDEX: 28.71 KG/M2 | WEIGHT: 212 LBS | OXYGEN SATURATION: 95 % | HEART RATE: 65 BPM | SYSTOLIC BLOOD PRESSURE: 115 MMHG | RESPIRATION RATE: 16 BRPM | TEMPERATURE: 98 F

## 2021-06-04 DIAGNOSIS — R11.0 NAUSEA: ICD-10-CM

## 2021-06-04 DIAGNOSIS — R42 DIZZINESS: Primary | ICD-10-CM

## 2021-06-04 DIAGNOSIS — E10.65 TYPE 1 DIABETES MELLITUS WITH HYPERGLYCEMIA (HCC): ICD-10-CM

## 2021-06-04 DIAGNOSIS — R94.6 ABNORMAL THYROID FUNCTION TEST: ICD-10-CM

## 2021-06-04 LAB
ALBUMIN SERPL-MCNC: 4.3 G/DL (ref 3.5–5.2)
ALBUMIN SERPL-MCNC: 4.4 G/DL (ref 3.5–5.2)
ALBUMIN UR-MCNC: 1.5 MG/DL
ALBUMIN/GLOB SERPL: 1.8 G/DL
ALBUMIN/GLOB SERPL: 1.8 G/DL
ALP SERPL-CCNC: 62 U/L (ref 39–117)
ALP SERPL-CCNC: 66 U/L (ref 39–117)
ALT SERPL W P-5'-P-CCNC: 23 U/L (ref 1–41)
ALT SERPL W P-5'-P-CCNC: 25 U/L (ref 1–41)
ANION GAP SERPL CALCULATED.3IONS-SCNC: 10.8 MMOL/L (ref 5–15)
ANION GAP SERPL CALCULATED.3IONS-SCNC: 11 MMOL/L (ref 5–15)
AST SERPL-CCNC: 26 U/L (ref 1–40)
AST SERPL-CCNC: 27 U/L (ref 1–40)
BASOPHILS # BLD AUTO: 0 10*3/MM3 (ref 0–0.2)
BASOPHILS NFR BLD AUTO: 0.5 % (ref 0–1.5)
BILIRUB SERPL-MCNC: 1 MG/DL (ref 0–1.2)
BILIRUB SERPL-MCNC: 1 MG/DL (ref 0–1.2)
BUN SERPL-MCNC: 16 MG/DL (ref 8–23)
BUN SERPL-MCNC: 16 MG/DL (ref 8–23)
BUN/CREAT SERPL: 14.8 (ref 7–25)
BUN/CREAT SERPL: 15.8 (ref 7–25)
CALCIUM SPEC-SCNC: 9.4 MG/DL (ref 8.6–10.5)
CALCIUM SPEC-SCNC: 9.5 MG/DL (ref 8.6–10.5)
CHLORIDE SERPL-SCNC: 104 MMOL/L (ref 98–107)
CHLORIDE SERPL-SCNC: 106 MMOL/L (ref 98–107)
CHOLEST SERPL-MCNC: 112 MG/DL (ref 0–200)
CO2 SERPL-SCNC: 22 MMOL/L (ref 22–29)
CO2 SERPL-SCNC: 26.2 MMOL/L (ref 22–29)
CREAT SERPL-MCNC: 1.01 MG/DL (ref 0.76–1.27)
CREAT SERPL-MCNC: 1.08 MG/DL (ref 0.76–1.27)
CREAT UR-MCNC: 183 MG/DL
DEPRECATED RDW RBC AUTO: 41.1 FL (ref 37–54)
EOSINOPHIL # BLD AUTO: 0.1 10*3/MM3 (ref 0–0.4)
EOSINOPHIL NFR BLD AUTO: 1 % (ref 0.3–6.2)
ERYTHROCYTE [DISTWIDTH] IN BLOOD BY AUTOMATED COUNT: 13.4 % (ref 12.3–15.4)
GFR SERPL CREATININE-BSD FRML MDRD: 67 ML/MIN/1.73
GFR SERPL CREATININE-BSD FRML MDRD: 72 ML/MIN/1.73
GLOBULIN UR ELPH-MCNC: 2.4 GM/DL
GLOBULIN UR ELPH-MCNC: 2.4 GM/DL
GLUCOSE BLDC GLUCOMTR-MCNC: 126 MG/DL (ref 70–105)
GLUCOSE SERPL-MCNC: 116 MG/DL (ref 65–99)
GLUCOSE SERPL-MCNC: 124 MG/DL (ref 65–99)
HCT VFR BLD AUTO: 42.8 % (ref 37.5–51)
HDLC SERPL-MCNC: 42 MG/DL (ref 40–60)
HGB BLD-MCNC: 14.6 G/DL (ref 13–17.7)
LDLC SERPL CALC-MCNC: 56 MG/DL (ref 0–100)
LDLC/HDLC SERPL: 1.37 {RATIO}
LYMPHOCYTES # BLD AUTO: 1.6 10*3/MM3 (ref 0.7–3.1)
LYMPHOCYTES NFR BLD AUTO: 26.4 % (ref 19.6–45.3)
MAGNESIUM SERPL-MCNC: 1.9 MG/DL (ref 1.6–2.4)
MCH RBC QN AUTO: 30.4 PG (ref 26.6–33)
MCHC RBC AUTO-ENTMCNC: 34 G/DL (ref 31.5–35.7)
MCV RBC AUTO: 89.2 FL (ref 79–97)
MICROALBUMIN/CREAT UR: 8.2 MG/G
MONOCYTES # BLD AUTO: 0.6 10*3/MM3 (ref 0.1–0.9)
MONOCYTES NFR BLD AUTO: 10.4 % (ref 5–12)
NEUTROPHILS NFR BLD AUTO: 3.9 10*3/MM3 (ref 1.7–7)
NEUTROPHILS NFR BLD AUTO: 61.7 % (ref 42.7–76)
NRBC BLD AUTO-RTO: 0.1 /100 WBC (ref 0–0.2)
NT-PROBNP SERPL-MCNC: 244.2 PG/ML (ref 0–900)
PLATELET # BLD AUTO: 173 10*3/MM3 (ref 140–450)
PMV BLD AUTO: 7.7 FL (ref 6–12)
POTASSIUM SERPL-SCNC: 4.5 MMOL/L (ref 3.5–5.2)
POTASSIUM SERPL-SCNC: 4.9 MMOL/L (ref 3.5–5.2)
PROT SERPL-MCNC: 6.7 G/DL (ref 6–8.5)
PROT SERPL-MCNC: 6.8 G/DL (ref 6–8.5)
RBC # BLD AUTO: 4.79 10*6/MM3 (ref 4.14–5.8)
SODIUM SERPL-SCNC: 139 MMOL/L (ref 136–145)
SODIUM SERPL-SCNC: 141 MMOL/L (ref 136–145)
T4 FREE SERPL-MCNC: 1.01 NG/DL (ref 0.93–1.7)
TRIGL SERPL-MCNC: 63 MG/DL (ref 0–150)
TROPONIN T SERPL-MCNC: <0.01 NG/ML (ref 0–0.03)
TSH SERPL DL<=0.05 MIU/L-ACNC: 4.24 UIU/ML (ref 0.27–4.2)
TSH SERPL DL<=0.05 MIU/L-ACNC: 4.78 UIU/ML (ref 0.27–4.2)
VLDLC SERPL-MCNC: 14 MG/DL (ref 5–40)
WBC # BLD AUTO: 6.2 10*3/MM3 (ref 3.4–10.8)

## 2021-06-04 PROCEDURE — 80053 COMPREHEN METABOLIC PANEL: CPT | Performed by: PHYSICIAN ASSISTANT

## 2021-06-04 PROCEDURE — 86376 MICROSOMAL ANTIBODY EACH: CPT

## 2021-06-04 PROCEDURE — 93005 ELECTROCARDIOGRAM TRACING: CPT

## 2021-06-04 PROCEDURE — 84443 ASSAY THYROID STIM HORMONE: CPT | Performed by: PHYSICIAN ASSISTANT

## 2021-06-04 PROCEDURE — 82962 GLUCOSE BLOOD TEST: CPT

## 2021-06-04 PROCEDURE — 80061 LIPID PANEL: CPT

## 2021-06-04 PROCEDURE — 84484 ASSAY OF TROPONIN QUANT: CPT | Performed by: PHYSICIAN ASSISTANT

## 2021-06-04 PROCEDURE — 83880 ASSAY OF NATRIURETIC PEPTIDE: CPT | Performed by: PHYSICIAN ASSISTANT

## 2021-06-04 PROCEDURE — 25010000002 ONDANSETRON PER 1 MG: Performed by: PHYSICIAN ASSISTANT

## 2021-06-04 PROCEDURE — 83735 ASSAY OF MAGNESIUM: CPT | Performed by: PHYSICIAN ASSISTANT

## 2021-06-04 PROCEDURE — 80053 COMPREHEN METABOLIC PANEL: CPT

## 2021-06-04 PROCEDURE — 84443 ASSAY THYROID STIM HORMONE: CPT

## 2021-06-04 PROCEDURE — 82043 UR ALBUMIN QUANTITATIVE: CPT

## 2021-06-04 PROCEDURE — 85025 COMPLETE CBC W/AUTO DIFF WBC: CPT | Performed by: PHYSICIAN ASSISTANT

## 2021-06-04 PROCEDURE — 83036 HEMOGLOBIN GLYCOSYLATED A1C: CPT

## 2021-06-04 PROCEDURE — 99283 EMERGENCY DEPT VISIT LOW MDM: CPT

## 2021-06-04 PROCEDURE — 96374 THER/PROPH/DIAG INJ IV PUSH: CPT

## 2021-06-04 PROCEDURE — 71045 X-RAY EXAM CHEST 1 VIEW: CPT

## 2021-06-04 PROCEDURE — 82570 ASSAY OF URINE CREATININE: CPT

## 2021-06-04 PROCEDURE — 36415 COLL VENOUS BLD VENIPUNCTURE: CPT

## 2021-06-04 PROCEDURE — 84439 ASSAY OF FREE THYROXINE: CPT

## 2021-06-04 RX ORDER — MECLIZINE HYDROCHLORIDE 25 MG/1
25 TABLET ORAL 3 TIMES DAILY PRN
Qty: 15 TABLET | Refills: 0 | Status: SHIPPED | OUTPATIENT
Start: 2021-06-04

## 2021-06-04 RX ORDER — ONDANSETRON 2 MG/ML
4 INJECTION INTRAMUSCULAR; INTRAVENOUS ONCE
Status: COMPLETED | OUTPATIENT
Start: 2021-06-04 | End: 2021-06-04

## 2021-06-04 RX ORDER — SODIUM CHLORIDE 0.9 % (FLUSH) 0.9 %
10 SYRINGE (ML) INJECTION AS NEEDED
Status: DISCONTINUED | OUTPATIENT
Start: 2021-06-04 | End: 2021-06-04 | Stop reason: HOSPADM

## 2021-06-04 RX ORDER — ONDANSETRON 4 MG/1
4 TABLET, ORALLY DISINTEGRATING ORAL EVERY 8 HOURS PRN
Qty: 20 TABLET | Refills: 0 | Status: SHIPPED | OUTPATIENT
Start: 2021-06-04 | End: 2021-09-28

## 2021-06-04 RX ORDER — MECLIZINE HYDROCHLORIDE 25 MG/1
25 TABLET ORAL ONCE
Status: COMPLETED | OUTPATIENT
Start: 2021-06-04 | End: 2021-06-04

## 2021-06-04 RX ADMIN — MECLIZINE HYDROCHLORIDE 25 MG: 25 TABLET ORAL at 09:56

## 2021-06-04 RX ADMIN — SODIUM CHLORIDE 1000 ML: 9 INJECTION, SOLUTION INTRAVENOUS at 09:57

## 2021-06-04 RX ADMIN — ONDANSETRON 4 MG: 2 INJECTION INTRAMUSCULAR; INTRAVENOUS at 09:56

## 2021-06-04 NOTE — ED NOTES
[facility] request, [facility] head report.     Temi Dickerson, PCT  06/04/21 0921

## 2021-06-04 NOTE — ED PROVIDER NOTES
"Subjective   Patient is a 75-year-old male [facility] significant for diabetes type 1, hypertension, hyperlipidemia, hypothyroidism, subdural hygroma, and cardiac pacemaker who presents with complaints of intermittent dizziness over the past 8 to 10 years it has progressed over the past 2 months and worse today.  Patient states it has become more frequent.  He states is with position change and certain movements of his head better when laying flat.  He reports associated nausea.  He describes the dizziness as the room spinning around him not near syncopal.  Patient's wife at bedside states he has had \"every test and no one can figure out what is causing it\" they also report that [facility] and saw his neurosurgeon Yoav Santillan PA-C with [facility] on 6/2/2021 who reviewed the recent head CT and cleared him.  Patient states he woke up with the dizziness today and has not resolved.  Patient states he was started on a new thyroid medication about 6 to 8 weeks ago otherwise no new medications or recent antibiotic use.  No chest pain vomiting abdominal pain black or bloody stools urinary symptoms fever or known sick contacts.  Patient states he takes no medication for the dizziness.  Denies any recent head injuries or falls.  No one-sided numbness weakness slurred speech or facial droop.  Patient's wife also reports he has shortness of breath worse with exertion that is been going on over the past few months.  Patient's wife states that he spoken to his cardiologist and PCP in regards to his shortness of breath.  No cough, rhinorrhea, nasal congestion, ear pain or drainage patient states he is even gotten his hearing aids checked to make sure that started was causing his dizziness and that was unremarkable.          Review of Systems   Constitutional: Negative.    HENT: Negative.    Eyes: Negative.    Respiratory: Positive for shortness of breath. Negative for apnea, cough, " choking, chest tightness, wheezing and stridor.    Cardiovascular: Negative.    Gastrointestinal: Positive for nausea. Negative for abdominal distention, abdominal pain, blood in stool, constipation, diarrhea and vomiting.   Genitourinary: Negative for decreased urine volume, difficulty urinating, flank pain, frequency, hematuria and urgency.   Musculoskeletal: Negative for back pain, neck pain and neck stiffness.   Skin: Negative.    Neurological: Positive for dizziness. Negative for tremors, seizures, syncope, facial asymmetry, speech difficulty, weakness, light-headedness, numbness and headaches.   Hematological: Negative.        Past Medical History:   Diagnosis Date   • Diabetes mellitus type I (CMS/HCC)    • Hyperlipidemia    • Hypertension    • Skin cancer    • Thyroid disease        No Known Allergies    Past Surgical History:   Procedure Laterality Date   • BACK SURGERY     • CARDIOVASCULAR STRESS TEST      Medications induced sress test   • CARPAL TUNNEL RELEASE     • CATARACT EXTRACTION     • COLONOSCOPY  09/29/2017   • GALLBLADDER SURGERY     • HERNIA REPAIR     • KNEE SURGERY         Family History   Problem Relation Age of Onset   • Stroke Mother    • Heart attack Sister    • Cancer Sister         colon   • Cancer Brother         lung   • Alcohol abuse Brother    • No Known Problems Father    • No Known Problems Maternal Aunt    • No Known Problems Maternal Uncle    • No Known Problems Paternal Aunt    • No Known Problems Paternal Uncle    • No Known Problems Maternal Grandmother    • No Known Problems Maternal Grandfather    • No Known Problems Paternal Grandmother    • No Known Problems Paternal Grandfather    • No Known Problems Other    • Anemia Neg Hx    • Arrhythmia Neg Hx    • Asthma Neg Hx    • Clotting disorder Neg Hx    • Fainting Neg Hx    • Heart disease Neg Hx    • Heart failure Neg Hx    • Hyperlipidemia Neg Hx    • Hypertension Neg Hx        Social History     Socioeconomic History   •  Marital status:      Spouse name: Not on file   • Number of children: Not on file   • Years of education: Not on file   • Highest education level: Not on file   Tobacco Use   • Smoking status: Former Smoker   • Smokeless tobacco: Never Used   Vaping Use   • Vaping Use: Never used   Substance and Sexual Activity   • Alcohol use: Yes     Comment: occ   • Drug use: Never   • Sexual activity: Never           Objective   Physical Exam  Vitals and nursing note reviewed.   Constitutional:       General: He is not in acute distress.     Appearance: He is well-developed. He is not ill-appearing, toxic-appearing or diaphoretic.   HENT:      Head: Normocephalic and atraumatic.      Right Ear: Tympanic membrane, ear canal and external ear normal.      Left Ear: Tympanic membrane, ear canal and external ear normal.      Mouth/Throat:      Mouth: Mucous membranes are moist.      Pharynx: Oropharynx is clear.   Eyes:      General: No scleral icterus.     Extraocular Movements: Extraocular movements intact.      Pupils: Pupils are equal, round, and reactive to light.   Cardiovascular:      Rate and Rhythm: Normal rate and regular rhythm.      Pulses: Normal pulses.      Heart sounds: No murmur heard.   No friction rub. No gallop.    Pulmonary:      Effort: Pulmonary effort is normal. No tachypnea, accessory muscle usage or respiratory distress.      Breath sounds: Normal breath sounds. No stridor. No decreased breath sounds, wheezing, rhonchi or rales.   Chest:      Chest wall: No mass, deformity, tenderness or crepitus.   Abdominal:      General: Bowel sounds are normal. There is no distension.      Palpations: Abdomen is soft. There is no mass.      Tenderness: There is no abdominal tenderness. There is no guarding or rebound.      Hernia: No hernia is present.   Musculoskeletal:      Cervical back: Normal range of motion and neck supple. No rigidity.   Skin:     General: Skin is warm.      Capillary Refill: Capillary  "refill takes less than 2 seconds.      Findings: No rash.   Neurological:      Mental Status: He is alert and oriented to person, place, and time.   Psychiatric:         Mood and Affect: Mood normal.         Behavior: Behavior normal.         Procedures           ED Course  ED Course as of Jun 04 1056 Fri Jun 04, 2021   1004 Orthostatics were negative for orthostatic hypotension did have some continued dizziness throughout movement    [AA]      ED Course User Index  [AA] Gil Strauss PA    Blood pressure 121/67, pulse 65, temperature 97.7 °F (36.5 °C), resp. rate 16, height 182.9 cm (72\"), weight 96.2 kg (212 lb), SpO2 98 %.    Medications   sodium chloride 0.9 % flush 10 mL (has no administration in time range)   sodium chloride 0.9 % bolus 1,000 mL (1,000 mL Intravenous New Bag 6/4/21 0957)   meclizine (ANTIVERT) tablet 25 mg (25 mg Oral Given 6/4/21 0956)   ondansetron (ZOFRAN) injection 4 mg (4 mg Intravenous Given 6/4/21 0956)     Labs Reviewed   COMPREHENSIVE METABOLIC PANEL - Abnormal; Notable for the following components:       Result Value    Glucose 116 (*)     All other components within normal limits    Narrative:     GFR Normal >60  Chronic Kidney Disease <60  Kidney Failure <15     TSH - Abnormal; Notable for the following components:    TSH 4.240 (*)     All other components within normal limits   POCT GLUCOSE FINGERSTICK - Abnormal; Notable for the following components:    Glucose 126 (*)     All other components within normal limits   BNP (IN-HOUSE) - Normal    Narrative:     Among patients with dyspnea, NT-proBNP is highly sensitive for the detection of acute congestive heart failure. In addition NT-proBNP of <300 pg/ml effectively rules out acute congestive heart failure with 99% negative predictive value.    Results may be falsely decreased if patient taking Biotin.     TROPONIN (IN-HOUSE) - Normal    Narrative:     Troponin T Reference Range:  <= 0.03 ng/mL-   Negative for AMI  >0.03 " ng/mL-     Abnormal for myocardial necrosis.  Clinicians would have to utilize clinical acumen, EKG, Troponin and serial changes to determine if it is an Acute Myocardial Infarction or myocardial injury due to an underlying chronic condition.       Results may be falsely decreased if patient taking Biotin.     MAGNESIUM - Normal   CBC WITH AUTO DIFFERENTIAL - Normal   CBC AND DIFFERENTIAL    Narrative:     The following orders were created for panel order CBC & Differential.  Procedure                               Abnormality         Status                     ---------                               -----------         ------                     CBC Auto Differential[[id number]]        Normal              Final result                 Please view results for these tests on the individual orders.     XR Chest 1 View    Result Date: 6/4/2021  No active disease.  Electronically Signed By-Krzysztof Gonzalez MD On:6/4/2021 9:48 AM This report was finalized on [id number] by  Krzysztof Gonzalez MD.                                           MDM  Number of Diagnoses or Management Options  Dizziness  Nausea  Diagnosis management comments: Chart Review:  Comorbidity: As per past medical history  ECG: Interpreted by myself and Dr. Luna shows ventricular paced rhythm rate 65 previous was reviewed from 5/31/2018.  Labs: CBC unremarkable CMP shows glucose 116 otherwise unremarkable magnesium 1.9 proBNP returned 44.2 troponin within normal limits TSH elevated 4.24 chart reviewed from 5 months ago show TSH at 8.150  Imaging: Was interpreted by physician and reviewed by myself:  XR Chest 1 View  Result Date: 6/4/2021  No active disease.  Electronically Signed By-Krzysztof Gonzalez MD On:6/4/2021 9:48 AM This report was finalized on [id number] by  Krzysztof Gonzalez MD.      Disposition/Treatment:  Appropriate PPE was worn during exam and throughout all encounters with the patient.  When the ED IV was placed and labs are obtained.  Patient was given  fluids Zofran and meclizine while in the ED.  Patient presents to the ED for dizziness that has been occurring intermittently over the last 8 to 10 years progressed over the past 2 months and worse today.  He was cleared by neurosurgery earlier this week who reviewed a CT of his head he has known subdural hygroma.  Lab results were significant for slightly elevated TSH as above he is currently on levothyroxine.  Otherwise blood work is fairly unremarkable no signs of severe infection or electrolyte abnormality.  Troponin and BNP within normal limits chest x-ray showed no active disease or pulmonary edema. Patient had a recent head CT done 2 weeks and has had the same symptoms no new focal deficits today to warrant additional imaging at this time patient and family at bedside were in agreement with this. Patient cannot get an MRI secondary to pacemaker. Patient's orthostatics were negative for orthostatic hypotension. Upon reassessment patient is resting quietly reports significant improvement in dizziness and nausea after medication. Patient's dizziness seems to be exacerbated with movements could be secondary to vertigo he has had multiple work-ups in the past for this symptoms he was advised to follow-up with PCP for further evaluation and management. Patient be given meclizine and Zofran for home as it did seem to improve him symptoms today. Lab results and findings were discussed with the patient and family at bedside who voiced understanding of discharge instructions along with signs and symptoms requiring return to the ED.  Upon discharged patient was in stable condition with followup for a revaluation.        Amount and/or Complexity of Data Reviewed  Clinical lab tests: reviewed  Tests in the radiology section of CPT®: reviewed  Tests in the medicine section of CPT®: reviewed        Final diagnoses:   Dizziness   Nausea       ED Disposition  ED Disposition     ED Disposition Condition Comment    Discharge  Tammy Velasquez MD  [address] [facility]  [address]  [place] [id number] [phone]    Schedule an appointment as soon as possible for a visit in 3 days      [facility]  [address] [facility] [address] [phone]  Go to   If symptoms worsen    [facility]  [address]  [facility] [id number] [phone]  Call   As needed for further evaluation and management of your dizziness.         Medication List      New Prescriptions    meclizine 25 MG tablet  Commonly known as: ANTIVERT  Take 1 tablet by mouth 3 (Three) Times a Day As Needed for Dizziness.     ondansetron ODT 4 MG disintegrating tablet  Commonly known as: ZOFRAN-ODT  Place 1 tablet on the tongue Every 8 (Eight) Hours As Needed for Nausea.           Where to Get Your Medications      These medications were sent to [id number] - [facility] [place] - [address] [place] [address] AT SEC OF [facility] [address] [place] [address] - [phone]  - [phone] [address] [place] [address]    Phone: [phone]   · meclizine 25 MG tablet  · ondansetron ODT 4 MG disintegrating tablet          Gil Strauss PA  06/04/21 1054

## 2021-06-04 NOTE — DISCHARGE INSTRUCTIONS
Take meclizine as directed for your dizziness. Take Zofran as needed for nausea. Drink plenty of clear fluids.    Follow-up with your primary care provider in 3-5 days.  If you do not have a primary care provider call [phone] for help in finding one, or you may follow up with [facility] at [phone].    Return to ED for any new or worsening symptoms

## 2021-06-05 LAB — THYROPEROXIDASE AB SERPL-ACNC: <9 IU/ML (ref 0–34)

## 2021-06-06 LAB
HBA1C MFR BLD: 6.2 % (ref 3.5–5.6)
QT INTERVAL: 467 MS

## 2021-06-07 ENCOUNTER — OFFICE VISIT (OUTPATIENT)
Dept: CARDIOLOGY | Facility: CLINIC | Age: 76
End: 2021-06-07

## 2021-06-07 ENCOUNTER — CLINICAL SUPPORT NO REQUIREMENTS (OUTPATIENT)
Dept: CARDIOLOGY | Facility: CLINIC | Age: 76
End: 2021-06-07

## 2021-06-07 ENCOUNTER — TELEPHONE (OUTPATIENT)
Dept: ENDOCRINOLOGY | Facility: CLINIC | Age: 76
End: 2021-06-07

## 2021-06-07 VITALS
OXYGEN SATURATION: 97 % | DIASTOLIC BLOOD PRESSURE: 69 MMHG | WEIGHT: 207 LBS | BODY MASS INDEX: 28.98 KG/M2 | HEIGHT: 71 IN | HEART RATE: 63 BPM | SYSTOLIC BLOOD PRESSURE: 108 MMHG

## 2021-06-07 DIAGNOSIS — Z95.0 PRESENCE OF CARDIAC PACEMAKER: ICD-10-CM

## 2021-06-07 DIAGNOSIS — Z95.0 PRESENCE OF CARDIAC PACEMAKER: Primary | ICD-10-CM

## 2021-06-07 DIAGNOSIS — I49.5 SICK SINUS SYNDROME (HCC): ICD-10-CM

## 2021-06-07 DIAGNOSIS — E10.65 TYPE 1 DIABETES MELLITUS WITH HYPERGLYCEMIA (HCC): ICD-10-CM

## 2021-06-07 DIAGNOSIS — E78.2 MIXED HYPERLIPIDEMIA: Primary | ICD-10-CM

## 2021-06-07 DIAGNOSIS — I10 HYPERTENSION, BENIGN: ICD-10-CM

## 2021-06-07 PROCEDURE — 99213 OFFICE O/P EST LOW 20 MIN: CPT | Performed by: INTERNAL MEDICINE

## 2021-06-07 PROCEDURE — 93279 PRGRMG DEV EVAL PM/LDLS PM: CPT | Performed by: INTERNAL MEDICINE

## 2021-06-07 RX ORDER — LEVOTHYROXINE SODIUM 0.03 MG/1
25 TABLET ORAL DAILY
COMMUNITY

## 2021-06-07 NOTE — H&P (VIEW-ONLY)
Subjective:     Encounter Date:06/07/2021      Patient ID: Floyd Herrera is a 75 y.o. male.    Chief Complaint: 3 mo f/u HTN, HLD, PM CHECK, ER visit 6/4    History of Present Illness     75 year-old white male patient with known history of hypertension diabetes dyslipidemia  status post permanent pacemaker placement   for sick sinus syndrome,  comes back for follow up.        Patient denies of any symptoms of chest pain or shortness of breath     [facility] diabetes dyslipidemia and thyroid problems     I advised patient to follow-up with PCP regarding history of asthma obstructive sleep apnea and shortness of breath  Cardiac wise appears to be stable if he have any recurrent problems he will contact me  Patient pacemaker was interrogated EKG showed AV pacer rhythm no changes in the pacemaker battery life 7 months  So advised him to come back in 2 months for recheck  He does have mostly isolated PVCs but patient denies of any palpitations  If any symptoms suggest stress test in the future    Patient comes back for follow-up  Pacemaker ASIA  Will schedule for generator change  Patient is having symptoms of vertigo will be followed by PCP regarding that       The following portions of the patient's history were reviewed and updated as appropriate: Allergies current medications past family history past medical history past social history past surgical history problem list and review of systems  Past Medical History:   Diagnosis Date   • Diabetes mellitus type I (CMS/HCC)    • Hyperlipidemia    • Hypertension    • Skin cancer    • Thyroid disease      Past Surgical History:   Procedure Laterality Date   • BACK SURGERY     • CARDIOVASCULAR STRESS TEST      Medications induced sress test   • CARPAL TUNNEL RELEASE     • CATARACT EXTRACTION     • COLONOSCOPY  09/29/2017   • GALLBLADDER SURGERY     • HERNIA REPAIR     • KNEE SURGERY       /69 (BP Location: Left arm,  "Patient Position: Sitting, Cuff Size: Adult)   Pulse 63   Ht 180.3 cm (71\")   Wt 93.9 kg (207 lb)   SpO2 97%   BMI 28.87 kg/m²   Family History   Problem Relation Age of Onset   • Stroke Mother    • Heart attack Sister    • Cancer Sister         colon   • Cancer Brother         lung   • Alcohol abuse Brother    • No Known Problems Father    • No Known Problems Maternal Aunt    • No Known Problems Maternal Uncle    • No Known Problems Paternal Aunt    • No Known Problems Paternal Uncle    • No Known Problems Maternal Grandmother    • No Known Problems Maternal Grandfather    • No Known Problems Paternal Grandmother    • No Known Problems Paternal Grandfather    • No Known Problems Other    • Anemia Neg Hx    • Arrhythmia Neg Hx    • Asthma Neg Hx    • Clotting disorder Neg Hx    • Fainting Neg Hx    • Heart disease Neg Hx    • Heart failure Neg Hx    • Hyperlipidemia Neg Hx    • Hypertension Neg Hx        Current Outpatient Medications:   •  ACCU-CHEK FASTCLIX LANCETS misc, Use to check blood sugar twice daily, Disp: , Rfl:   •  alfuzosin (UROXATRAL) 10 MG 24 hr tablet, TAKE 1 TABLET EVERY DAY, Disp: 90 tablet, Rfl: 1  •  aspirin 81 MG tablet, ASPIRIN 81 MG ORAL TABLET, Disp: , Rfl:   •  atorvastatin (LIPITOR) 40 MG tablet, TAKE 1 TABLET EVERY DAY, Disp: 90 tablet, Rfl: 1  •  cetirizine (zyrTEC) 10 MG tablet, , Disp: , Rfl:   •  fluticasone (FLONASE) 50 MCG/ACT nasal spray, As Needed., Disp: , Rfl:   •  glucose blood (ACCU-CHEK GUIDE) test strip, Use to check blood sugar twice daily, Disp: , Rfl:   •  Insulin Glargine (LANTUS SOLOSTAR) 100 UNIT/ML injection pen, LANTUS SOLOSTAR 100 UNIT/ML SOPN, Disp: , Rfl:   •  Insulin Pen Needle (B-D ULTRAFINE III SHORT PEN) 31G X 8 MM misc, BD PEN NEEDLE SHORT U/F 31G X 8 MM, Disp: , Rfl:   •  levothyroxine (Synthroid) 50 MCG tablet, Take 25 mcg by mouth Daily., Disp: , Rfl:   •  levothyroxine (SYNTHROID, LEVOTHROID) 25 MCG tablet, Take 12.5 mcg by mouth Daily., Disp: , Rfl: "   •  meclizine (ANTIVERT) 25 MG tablet, Take 1 tablet by mouth 3 (Three) Times a Day As Needed for Dizziness., Disp: 15 tablet, Rfl: 0  •  metFORMIN (GLUCOPHAGE) 500 MG tablet, Take 500 mg by mouth 2 (Two) Times a Day With Meals., Disp: , Rfl:   •  montelukast (SINGULAIR) 10 MG tablet, TAKE 1 TABLET EVERY DAY, Disp: 90 tablet, Rfl: 3  •  Multiple Vitamins-Minerals (MULTI VITAMIN/MINERALS) tablet, MULTI VITAMIN/MINERALS TABS, Disp: , Rfl:   •  ondansetron ODT (ZOFRAN-ODT) 4 MG disintegrating tablet, Place 1 tablet on the tongue Every 8 (Eight) Hours As Needed for Nausea., Disp: 20 tablet, Rfl: 0  •  pantoprazole (PROTONIX) 40 MG EC tablet, TAKE 1 TABLET EVERY DAY, Disp: 90 tablet, Rfl: 1  •  vitamin B-12 (CYANOCOBALAMIN) 1000 MCG tablet, Take 1,000 mcg by mouth Daily., Disp: , Rfl:   Social History     Socioeconomic History   • Marital status:      Spouse name: Not on file   • Number of children: Not on file   • Years of education: Not on file   • Highest education level: Not on file   Tobacco Use   • Smoking status: Former Smoker   • Smokeless tobacco: Never Used   Vaping Use   • Vaping Use: Never used   Substance and Sexual Activity   • Alcohol use: Yes     Comment: occ   • Drug use: Never   • Sexual activity: Never     No Known Allergies  Review of Systems   Constitutional: Negative for chills, fever and malaise/fatigue.   Cardiovascular: Positive for dyspnea on exertion. Negative for chest pain, leg swelling, palpitations and syncope.   Respiratory: Positive for shortness of breath.    Skin: Negative for rash.   Gastrointestinal: Positive for nausea.   Neurological: Positive for dizziness. Negative for light-headedness and numbness.              Objective:     Physical Exam  Vital stable neck no JVP elevation lungs clear heart sounds S1-S2 regular extremities no edema bilateral pulses present equal  Procedures    Lab Review:       Assessment:          Diagnosis Plan   1. Mixed hyperlipidemia     2.  Hypertension, benign     3. Presence of cardiac pacemaker     4. Sick sinus syndrome (CMS/HCC)     5. Type 1 diabetes mellitus with hyperglycemia (CMS/[facility])            Plan:       MDM  Number of Diagnoses or Management Options  Hypertension, benign: established, improving  Mixed hyperlipidemia: established, improving  Presence of cardiac pacemaker: established, worsening  Sick sinus syndrome (CMS/HCC): established, improving  Type 1 diabetes mellitus with hyperglycemia (CMS/[facility]): established, improving     Amount and/or Complexity of Data Reviewed  Clinical lab tests: reviewed  Review and summarize past medical records: yes    Risk of Complications, Morbidity, and/or Mortality  Presenting problems: moderate  Management options: moderate    Patient Progress  Patient progress: other (comment)

## 2021-06-07 NOTE — TELEPHONE ENCOUNTER
Pt had to reschedule visit until November, would like to know which labs will need to be repeated before that appt.

## 2021-06-07 NOTE — PROGRESS NOTES
Subjective:     Encounter Date:06/07/2021      Patient ID: Floyd Herrera is a 75 y.o. male.    Chief Complaint: 3 mo f/u HTN, HLD, PM CHECK, ER visit 6/4    History of Present Illness     75 year-old white male patient with known history of hypertension diabetes dyslipidemia  status post permanent pacemaker placement   for sick sinus syndrome,  comes back for follow up.        Patient denies of any symptoms of chest pain or shortness of breath     [facility] regarding diabetes dyslipidemia and thyroid problems     I advised patient to follow-up with PCP regarding history of asthma obstructive sleep apnea and shortness of breath  Cardiac wise appears to be stable if he have any recurrent problems he will contact me  Patient pacemaker was interrogated EKG showed AV pacer rhythm no changes in the pacemaker battery life 7 months  So advised him to come back in 2 months for recheck  He does have mostly isolated PVCs but patient denies of any palpitations  If any symptoms suggest stress test in the future    Patient comes back for follow-up  Pacemaker ASIA  Will schedule for generator change  Patient is having symptoms of vertigo will be followed by PCP regarding that       The following portions of the patient's history were reviewed and updated as appropriate: Allergies current medications past family history past medical history past social history past surgical history problem list and review of systems  Past Medical History:   Diagnosis Date   • Diabetes mellitus type I (CMS/HCC)    • Hyperlipidemia    • Hypertension    • Skin cancer    • Thyroid disease      Past Surgical History:   Procedure Laterality Date   • BACK SURGERY     • CARDIOVASCULAR STRESS TEST      Medications induced sress test   • CARPAL TUNNEL RELEASE     • CATARACT EXTRACTION     • COLONOSCOPY  09/29/2017   • GALLBLADDER SURGERY     • HERNIA REPAIR     • KNEE SURGERY       /69 (BP Location: Left arm,  "Patient Position: Sitting, Cuff Size: Adult)   Pulse 63   Ht 180.3 cm (71\")   Wt 93.9 kg (207 lb)   SpO2 97%   BMI 28.87 kg/m²   Family History   Problem Relation Age of Onset   • Stroke Mother    • Heart attack Sister    • Cancer Sister         colon   • Cancer Brother         lung   • Alcohol abuse Brother    • No Known Problems Father    • No Known Problems Maternal Aunt    • No Known Problems Maternal Uncle    • No Known Problems Paternal Aunt    • No Known Problems Paternal Uncle    • No Known Problems Maternal Grandmother    • No Known Problems Maternal Grandfather    • No Known Problems Paternal Grandmother    • No Known Problems Paternal Grandfather    • No Known Problems Other    • Anemia Neg Hx    • Arrhythmia Neg Hx    • Asthma Neg Hx    • Clotting disorder Neg Hx    • Fainting Neg Hx    • Heart disease Neg Hx    • Heart failure Neg Hx    • Hyperlipidemia Neg Hx    • Hypertension Neg Hx        Current Outpatient Medications:   •  ACCU-CHEK FASTCLIX LANCETS misc, Use to check blood sugar twice daily, Disp: , Rfl:   •  alfuzosin (UROXATRAL) 10 MG 24 hr tablet, TAKE 1 TABLET EVERY DAY, Disp: 90 tablet, Rfl: 1  •  aspirin 81 MG tablet, ASPIRIN 81 MG ORAL TABLET, Disp: , Rfl:   •  atorvastatin (LIPITOR) 40 MG tablet, TAKE 1 TABLET EVERY DAY, Disp: 90 tablet, Rfl: 1  •  cetirizine (zyrTEC) 10 MG tablet, , Disp: , Rfl:   •  fluticasone (FLONASE) 50 MCG/ACT nasal spray, As Needed., Disp: , Rfl:   •  glucose blood (ACCU-CHEK GUIDE) test strip, Use to check blood sugar twice daily, Disp: , Rfl:   •  Insulin Glargine (LANTUS SOLOSTAR) 100 UNIT/ML injection pen, LANTUS SOLOSTAR 100 UNIT/ML SOPN, Disp: , Rfl:   •  Insulin Pen Needle (B-D ULTRAFINE III SHORT PEN) 31G X 8 MM misc, BD PEN NEEDLE SHORT U/F 31G X 8 MM, Disp: , Rfl:   •  levothyroxine (Synthroid) 50 MCG tablet, Take 25 mcg by mouth Daily., Disp: , Rfl:   •  levothyroxine (SYNTHROID, LEVOTHROID) 25 MCG tablet, Take 12.5 mcg by mouth Daily., Disp: , Rfl: "   •  meclizine (ANTIVERT) 25 MG tablet, Take 1 tablet by mouth 3 (Three) Times a Day As Needed for Dizziness., Disp: 15 tablet, Rfl: 0  •  metFORMIN (GLUCOPHAGE) 500 MG tablet, Take 500 mg by mouth 2 (Two) Times a Day With Meals., Disp: , Rfl:   •  montelukast (SINGULAIR) 10 MG tablet, TAKE 1 TABLET EVERY DAY, Disp: 90 tablet, Rfl: 3  •  Multiple Vitamins-Minerals (MULTI VITAMIN/MINERALS) tablet, MULTI VITAMIN/MINERALS TABS, Disp: , Rfl:   •  ondansetron ODT (ZOFRAN-ODT) 4 MG disintegrating tablet, Place 1 tablet on the tongue Every 8 (Eight) Hours As Needed for Nausea., Disp: 20 tablet, Rfl: 0  •  pantoprazole (PROTONIX) 40 MG EC tablet, TAKE 1 TABLET EVERY DAY, Disp: 90 tablet, Rfl: 1  •  vitamin B-12 (CYANOCOBALAMIN) 1000 MCG tablet, Take 1,000 mcg by mouth Daily., Disp: , Rfl:   Social History     Socioeconomic History   • Marital status:      Spouse name: Not on file   • Number of children: Not on file   • Years of education: Not on file   • Highest education level: Not on file   Tobacco Use   • Smoking status: Former Smoker   • Smokeless tobacco: Never Used   Vaping Use   • Vaping Use: Never used   Substance and Sexual Activity   • Alcohol use: Yes     Comment: occ   • Drug use: Never   • Sexual activity: Never     No Known Allergies  Review of Systems   Constitutional: Negative for chills, fever and malaise/fatigue.   Cardiovascular: Positive for dyspnea on exertion. Negative for chest pain, leg swelling, palpitations and syncope.   Respiratory: Positive for shortness of breath.    Skin: Negative for rash.   Gastrointestinal: Positive for nausea.   Neurological: Positive for dizziness. Negative for light-headedness and numbness.              Objective:     Physical Exam  Vital stable neck no JVP elevation lungs clear heart sounds S1-S2 regular extremities no edema bilateral pulses present equal  Procedures    Lab Review:       Assessment:          Diagnosis Plan   1. Mixed hyperlipidemia     2.  Hypertension, benign     3. Presence of cardiac pacemaker     4. Sick sinus syndrome (CMS/HCC)     5. Type 1 diabetes mellitus with hyperglycemia (CMS/[facility])            Plan:       MDM  Number of Diagnoses or Management Options  Hypertension, benign: established, improving  Mixed hyperlipidemia: established, improving  Presence of cardiac pacemaker: established, worsening  Sick sinus syndrome (CMS/HCC): established, improving  Type 1 diabetes mellitus with hyperglycemia (CMS/[facility]): established, improving     Amount and/or Complexity of Data Reviewed  Clinical lab tests: reviewed  Review and summarize past medical records: yes    Risk of Complications, Morbidity, and/or Mortality  Presenting problems: moderate  Management options: moderate    Patient Progress  Patient progress: other (comment)

## 2021-06-07 NOTE — PROGRESS NOTES
Encounter Date:06/07/2021      Patient ID: Floyd Herrera is a 75 y.o. male.    Chief Complaint:      History of Present Illness      Assessment and Plan                Diagnosis Plan   1. Presence of cardiac pacemaker     2. Sick sinus syndrome (CMS/HCC)     LAB RESULTS (LAST 7 DAYS)    CBC  Results from last 7 days   Lab Units 06/04/21  0927   WBC 10*3/mm3 6.20   RBC 10*6/mm3 4.79   HEMOGLOBIN g/dL 14.6   HEMATOCRIT % 42.8   MCV fL 89.2   PLATELETS 10*3/mm3 173       BMP  Results from last 7 days   Lab Units 06/04/21  0927 06/04/21  0826   SODIUM mmol/L 139 141   POTASSIUM mmol/L 4.5 4.9   CHLORIDE mmol/L 106 104   CO2 mmol/L 22.0 26.2   BUN mg/dL 16 16   CREATININE mg/dL 1.08 1.01   GLUCOSE mg/dL 116* 124*   MAGNESIUM mg/dL 1.9  --        CMP   Results from last 7 days   Lab Units 06/04/21  0927 06/04/21  0826   SODIUM mmol/L 139 141   POTASSIUM mmol/L 4.5 4.9   CHLORIDE mmol/L 106 104   CO2 mmol/L 22.0 26.2   BUN mg/dL 16 16   CREATININE mg/dL 1.08 1.01   GLUCOSE mg/dL 116* 124*   ALBUMIN g/dL 4.30 4.40   BILIRUBIN mg/dL 1.0 1.0   ALK PHOS U/L 66 62   AST (SGOT) U/L 26 27   ALT (SGPT) U/L 23 25         BNP        TROPONIN  Results from last 7 days   Lab Units 06/04/21  0927   TROPONIN T ng/mL <0.010       CoAg        Creatinine Clearance  Estimated Creatinine Clearance: 71.3 mL/min (by C-G formula based on SCr of 1.08 mg/dL).    ABG        Radiology  No radiology results for the last day                The following portions of the patient's history were reviewed and updated as appropriate: allergies, current medications, past family history, past medical history, past social history, past surgical history and problem list.    ROS      Current Outpatient Medications:   •  ACCU-CHEK FASTCLIX LANCETS misc, Use to check blood sugar twice daily, Disp: , Rfl:   •  alfuzosin (UROXATRAL) 10 MG 24 hr tablet, TAKE 1 TABLET EVERY DAY, Disp: 90 tablet, Rfl: 1  •  aspirin 81 MG tablet, ASPIRIN 81 MG ORAL TABLET, Disp:  , Rfl:   •  atorvastatin (LIPITOR) 40 MG tablet, TAKE 1 TABLET EVERY DAY, Disp: 90 tablet, Rfl: 1  •  cetirizine (zyrTEC) 10 MG tablet, , Disp: , Rfl:   •  fluticasone (FLONASE) 50 MCG/ACT nasal spray, As Needed., Disp: , Rfl:   •  glucose blood (ACCU-CHEK GUIDE) test strip, Use to check blood sugar twice daily, Disp: , Rfl:   •  Insulin Glargine (LANTUS SOLOSTAR) 100 UNIT/ML injection pen, LANTUS SOLOSTAR 100 UNIT/ML SOPN, Disp: , Rfl:   •  Insulin Pen Needle (B-D ULTRAFINE III SHORT PEN) 31G X 8 MM misc, BD PEN NEEDLE SHORT U/F 31G X 8 MM, Disp: , Rfl:   •  levothyroxine (Synthroid) 50 MCG tablet, Take 25 mcg by mouth Daily., Disp: , Rfl:   •  levothyroxine (SYNTHROID, LEVOTHROID) 25 MCG tablet, Take 12.5 mcg by mouth Daily., Disp: , Rfl:   •  meclizine (ANTIVERT) 25 MG tablet, Take 1 tablet by mouth 3 (Three) Times a Day As Needed for Dizziness., Disp: 15 tablet, Rfl: 0  •  metFORMIN (GLUCOPHAGE) 500 MG tablet, Take 500 mg by mouth 2 (Two) Times a Day With Meals., Disp: , Rfl:   •  montelukast (SINGULAIR) 10 MG tablet, TAKE 1 TABLET EVERY DAY, Disp: 90 tablet, Rfl: 3  •  Multiple Vitamins-Minerals (MULTI VITAMIN/MINERALS) tablet, MULTI VITAMIN/MINERALS TABS, Disp: , Rfl:   •  ondansetron ODT (ZOFRAN-ODT) 4 MG disintegrating tablet, Place 1 tablet on the tongue Every 8 (Eight) Hours As Needed for Nausea., Disp: 20 tablet, Rfl: 0  •  pantoprazole (PROTONIX) 40 MG EC tablet, TAKE 1 TABLET EVERY DAY, Disp: 90 tablet, Rfl: 1  •  vitamin B-12 (CYANOCOBALAMIN) 1000 MCG tablet, Take 1,000 mcg by mouth Daily., Disp: , Rfl:     No Known Allergies    Family History   Problem Relation Age of Onset   • Stroke Mother    • Heart attack Sister    • Cancer Sister         colon   • Cancer Brother         lung   • Alcohol abuse Brother    • No Known Problems Father    • No Known Problems Maternal Aunt    • No Known Problems Maternal Uncle    • No Known Problems Paternal Aunt    • No Known Problems Paternal Uncle    • No Known  Problems Maternal Grandmother    • No Known Problems Maternal Grandfather    • No Known Problems Paternal Grandmother    • No Known Problems Paternal Grandfather    • No Known Problems Other    • Anemia Neg Hx    • Arrhythmia Neg Hx    • Asthma Neg Hx    • Clotting disorder Neg Hx    • Fainting Neg Hx    • Heart disease Neg Hx    • Heart failure Neg Hx    • Hyperlipidemia Neg Hx    • Hypertension Neg Hx        Past Surgical History:   Procedure Laterality Date   • BACK SURGERY     • CARDIOVASCULAR STRESS TEST      Medications induced sress test   • CARPAL TUNNEL RELEASE     • CATARACT EXTRACTION     • COLONOSCOPY  09/29/2017   • GALLBLADDER SURGERY     • HERNIA REPAIR     • KNEE SURGERY         Past Medical History:   Diagnosis Date   • Diabetes mellitus type I (CMS/HCC)    • Hyperlipidemia    • Hypertension    • Skin cancer    • Thyroid disease        Family History   Problem Relation Age of Onset   • Stroke Mother    • Heart attack Sister    • Cancer Sister         colon   • Cancer Brother         lung   • Alcohol abuse Brother    • No Known Problems Father    • No Known Problems Maternal Aunt    • No Known Problems Maternal Uncle    • No Known Problems Paternal Aunt    • No Known Problems Paternal Uncle    • No Known Problems Maternal Grandmother    • No Known Problems Maternal Grandfather    • No Known Problems Paternal Grandmother    • No Known Problems Paternal Grandfather    • No Known Problems Other    • Anemia Neg Hx    • Arrhythmia Neg Hx    • Asthma Neg Hx    • Clotting disorder Neg Hx    • Fainting Neg Hx    • Heart disease Neg Hx    • Heart failure Neg Hx    • Hyperlipidemia Neg Hx    • Hypertension Neg Hx        Social History     Socioeconomic History   • Marital status:      Spouse name: Not on file   • Number of children: Not on file   • Years of education: Not on file   • Highest education level: Not on file   Tobacco Use   • Smoking status: Former Smoker   • Smokeless tobacco: Never Used    Vaping Use   • Vaping Use: Never used   Substance and Sexual Activity   • Alcohol use: Yes     Comment: occ   • Drug use: Never   • Sexual activity: Never         Procedures      Objective:       Physical Exam    There were no vitals taken for this visit.  The patient is alert, oriented and in no distress.    Vital signs as noted above.    Head and neck revealed no carotid bruits or jugular venous distension.  No thyromegaly or lymphadenopathy is present.    Lungs clear.  No wheezing.  Breath sounds are normal bilaterally.    Heart normal first and second heart sounds.  No murmur..  No pericardial rub is present.  No gallop is present.    Abdomen soft and nontender.  No organomegaly is present.    Extremities revealed good peripheral pulses without any pedal edema.    Skin warm and dry.  Pacemaker site looks normal.    Musculoskeletal system is grossly normal.    CNS grossly normal.      6/7/2021.  Primary cardiologist Dr. Moss.  Patient had permanent dual-chamber pacemaker implantation 9/7/2010.  Interrogation of the pacemaker today revealed ASIA status.  Both the atrial and ventricular leads are MRI compatible.  Ventricular lead threshold is normal.  Atrial lead could not be tested.  Patient is having symptoms of dizziness.  Patient is not on any anticoagulation.  Patient to have dual-chamber pacemaker pulse generator replacement soon possible.  Risks and benefits pros and cons of the procedure were discussed with patient and patient's wife.  Patient may need temporary pacemaker support depending on interrogation of the pacemaker that day.  I have discussed with primary cardiologist Dr. Moss for coordination of care.

## 2021-06-08 NOTE — TELEPHONE ENCOUNTER
Check A1c, CMP, lipid panel, urine microalbumin creatinine ratio, TSH and free T4 before follow-up in November.

## 2021-06-09 ENCOUNTER — LAB (OUTPATIENT)
Dept: LAB | Facility: HOSPITAL | Age: 76
End: 2021-06-09

## 2021-06-09 ENCOUNTER — TRANSCRIBE ORDERS (OUTPATIENT)
Dept: ADMINISTRATIVE | Facility: HOSPITAL | Age: 76
End: 2021-06-09

## 2021-06-09 ENCOUNTER — HOSPITAL ENCOUNTER (OUTPATIENT)
Dept: CARDIOLOGY | Facility: HOSPITAL | Age: 76
Discharge: HOME | End: 2021-06-09

## 2021-06-09 DIAGNOSIS — E10.65 TYPE 1 DIABETES MELLITUS WITH HYPERGLYCEMIA (HCC): Primary | ICD-10-CM

## 2021-06-09 DIAGNOSIS — I49.5 SICK SINUS SYNDROME (HCC): ICD-10-CM

## 2021-06-09 DIAGNOSIS — Z95.0 PRESENCE OF CARDIAC PACEMAKER: ICD-10-CM

## 2021-06-09 DIAGNOSIS — Z95.0 PACEMAKER: ICD-10-CM

## 2021-06-09 DIAGNOSIS — Z95.0 PACEMAKER: Primary | ICD-10-CM

## 2021-06-09 LAB
INR PPP: 1.02 (ref 0.93–1.1)
MRSA DNA SPEC QL NAA+PROBE: NORMAL
PROTHROMBIN TIME: 11.2 SECONDS (ref 9.6–11.7)
SARS-COV-2 ORF1AB RESP QL NAA+PROBE: NOT DETECTED

## 2021-06-09 PROCEDURE — 93010 ELECTROCARDIOGRAM REPORT: CPT | Performed by: INTERNAL MEDICINE

## 2021-06-09 PROCEDURE — 93005 ELECTROCARDIOGRAM TRACING: CPT | Performed by: INTERNAL MEDICINE

## 2021-06-09 PROCEDURE — C9803 HOPD COVID-19 SPEC COLLECT: HCPCS

## 2021-06-09 PROCEDURE — 36415 COLL VENOUS BLD VENIPUNCTURE: CPT

## 2021-06-09 PROCEDURE — U0004 COV-19 TEST NON-CDC HGH THRU: HCPCS

## 2021-06-09 PROCEDURE — 85610 PROTHROMBIN TIME: CPT

## 2021-06-09 PROCEDURE — 87641 MR-STAPH DNA AMP PROBE: CPT

## 2021-06-11 ENCOUNTER — HOSPITAL ENCOUNTER (OUTPATIENT)
Facility: HOSPITAL | Age: 76
Setting detail: HOSPITAL OUTPATIENT SURGERY
Discharge: HOME | End: 2021-06-11
Attending: INTERNAL MEDICINE | Admitting: INTERNAL MEDICINE

## 2021-06-11 VITALS
DIASTOLIC BLOOD PRESSURE: 60 MMHG | OXYGEN SATURATION: 94 % | RESPIRATION RATE: 17 BRPM | HEIGHT: 71 IN | BODY MASS INDEX: 28.98 KG/M2 | WEIGHT: 207 LBS | HEART RATE: 60 BPM | SYSTOLIC BLOOD PRESSURE: 99 MMHG | TEMPERATURE: 98.3 F

## 2021-06-11 DIAGNOSIS — I49.5 SICK SINUS SYNDROME (HCC): ICD-10-CM

## 2021-06-11 DIAGNOSIS — Z95.0 PRESENCE OF CARDIAC PACEMAKER: ICD-10-CM

## 2021-06-11 PROCEDURE — C1889 IMPLANT/INSERT DEVICE, NOC: HCPCS | Performed by: INTERNAL MEDICINE

## 2021-06-11 PROCEDURE — 99152 MOD SED SAME PHYS/QHP 5/>YRS: CPT | Performed by: INTERNAL MEDICINE

## 2021-06-11 PROCEDURE — C1785 PMKR, DUAL, RATE-RESP: HCPCS | Performed by: INTERNAL MEDICINE

## 2021-06-11 PROCEDURE — 25010000002 VANCOMYCIN 1 G RECONSTITUTED SOLUTION 1 EACH VIAL: Performed by: INTERNAL MEDICINE

## 2021-06-11 PROCEDURE — 33228 REMV&REPLC PM GEN DUAL LEAD: CPT | Performed by: INTERNAL MEDICINE

## 2021-06-11 PROCEDURE — 25010000002 MIDAZOLAM PER 1 MG: Performed by: INTERNAL MEDICINE

## 2021-06-11 PROCEDURE — 25010000002 FENTANYL CITRATE (PF) 50 MCG/ML SOLUTION: Performed by: INTERNAL MEDICINE

## 2021-06-11 DEVICE — GEN PM AZURE S SURESCAN DR MRI: Type: IMPLANTABLE DEVICE | Status: FUNCTIONAL

## 2021-06-11 DEVICE — ENV PM AIGISRX ANTIBAC RESORB 2.5X2.7IN MD: Type: IMPLANTABLE DEVICE | Status: FUNCTIONAL

## 2021-06-11 RX ORDER — MIDAZOLAM HYDROCHLORIDE 1 MG/ML
INJECTION INTRAMUSCULAR; INTRAVENOUS AS NEEDED
Status: DISCONTINUED | OUTPATIENT
Start: 2021-06-11 | End: 2021-06-11 | Stop reason: HOSPADM

## 2021-06-11 RX ORDER — ACETAMINOPHEN 650 MG/1
650 SUPPOSITORY RECTAL EVERY 4 HOURS PRN
Status: DISCONTINUED | OUTPATIENT
Start: 2021-06-11 | End: 2021-06-11 | Stop reason: HOSPADM

## 2021-06-11 RX ORDER — FENTANYL CITRATE 50 UG/ML
INJECTION, SOLUTION INTRAMUSCULAR; INTRAVENOUS AS NEEDED
Status: DISCONTINUED | OUTPATIENT
Start: 2021-06-11 | End: 2021-06-11 | Stop reason: HOSPADM

## 2021-06-11 RX ORDER — SODIUM CHLORIDE 0.9 % (FLUSH) 0.9 %
10 SYRINGE (ML) INJECTION AS NEEDED
Status: DISCONTINUED | OUTPATIENT
Start: 2021-06-11 | End: 2021-06-11 | Stop reason: HOSPADM

## 2021-06-11 RX ORDER — SODIUM CHLORIDE 0.9 % (FLUSH) 0.9 %
3 SYRINGE (ML) INJECTION EVERY 12 HOURS SCHEDULED
Status: DISCONTINUED | OUTPATIENT
Start: 2021-06-11 | End: 2021-06-11 | Stop reason: HOSPADM

## 2021-06-11 RX ORDER — ACETAMINOPHEN 325 MG/1
650 TABLET ORAL EVERY 4 HOURS PRN
Status: DISCONTINUED | OUTPATIENT
Start: 2021-06-11 | End: 2021-06-11 | Stop reason: HOSPADM

## 2021-06-11 RX ORDER — LIDOCAINE HYDROCHLORIDE AND EPINEPHRINE 10; 10 MG/ML; UG/ML
INJECTION, SOLUTION INFILTRATION; PERINEURAL AS NEEDED
Status: DISCONTINUED | OUTPATIENT
Start: 2021-06-11 | End: 2021-06-11 | Stop reason: HOSPADM

## 2021-06-11 RX ADMIN — VANCOMYCIN HYDROCHLORIDE 1 G: 1 INJECTION, POWDER, LYOPHILIZED, FOR SOLUTION INTRAVENOUS at 07:54

## 2021-06-11 NOTE — DISCHARGE INSTRUCTIONS
Pacemaker DC Instructions- Follow up with Dr Moss in office at [phone] in 2 weeks    After Procedure:    1) Avoid shoulder motion for the first twenty-four (24) hours.  2)  Therefore; DO NOT:    a. Raise your arm on the operative side above your head until seen in office  b. Perform strenuous upper body work using the arm on the operative side until seen in office    2) Wear arm sling, if ordered by physician, to help decrease the use of arm on operative side.    After Discharge:    1) Carry your pacemaker identification card in your wallet or purse  2) We recommend you wear a MEDIC-ALERT bracelet or necklace to alert medical personnel  3) A topical skin adhesive may be used to close the incision.  DO NOT rub, scratch, or pick at the wound.  Keep wound dry.  Avoid topical ointments.  Protect the wound from prolonged exposure to sunlight.  If steri strips are used, they should be left in place until seen by physician.  4) No driving until after your follow-up appointment with the Cardiologist.     **Please be sure to read the pacemaker booklet given to you at time of discharge**

## 2021-06-13 LAB — QT INTERVAL: 445 MS

## 2021-06-28 ENCOUNTER — OFFICE VISIT (OUTPATIENT)
Dept: CARDIOLOGY | Facility: CLINIC | Age: 76
End: 2021-06-28

## 2021-06-28 ENCOUNTER — TELEPHONE (OUTPATIENT)
Dept: CARDIOLOGY | Facility: CLINIC | Age: 76
End: 2021-06-28

## 2021-06-28 ENCOUNTER — CLINICAL SUPPORT NO REQUIREMENTS (OUTPATIENT)
Dept: CARDIOLOGY | Facility: CLINIC | Age: 76
End: 2021-06-28

## 2021-06-28 VITALS
DIASTOLIC BLOOD PRESSURE: 80 MMHG | SYSTOLIC BLOOD PRESSURE: 138 MMHG | WEIGHT: 208 LBS | HEIGHT: 71 IN | HEART RATE: 64 BPM | BODY MASS INDEX: 29.12 KG/M2 | OXYGEN SATURATION: 97 %

## 2021-06-28 DIAGNOSIS — Z95.0 PRESENCE OF CARDIAC PACEMAKER: Primary | ICD-10-CM

## 2021-06-28 DIAGNOSIS — Z95.0 PRESENCE OF CARDIAC PACEMAKER: ICD-10-CM

## 2021-06-28 DIAGNOSIS — E78.2 MIXED HYPERLIPIDEMIA: ICD-10-CM

## 2021-06-28 DIAGNOSIS — I10 HYPERTENSION, BENIGN: Primary | ICD-10-CM

## 2021-06-28 DIAGNOSIS — I49.5 SICK SINUS SYNDROME (HCC): ICD-10-CM

## 2021-06-28 PROCEDURE — 93280 PM DEVICE PROGR EVAL DUAL: CPT | Performed by: INTERNAL MEDICINE

## 2021-06-28 PROCEDURE — 99024 POSTOP FOLLOW-UP VISIT: CPT | Performed by: INTERNAL MEDICINE

## 2021-06-28 NOTE — PROGRESS NOTES
Subjective:     Encounter Date:06/28/2021      Patient ID: Floyd Herrera is a 75 y.o. male.    Chief Complaint: Hyperlipidemia  History of Present Illness  75 year-old white male patient with known history of hypertension diabetes dyslipidemia  status post permanent pacemaker placement   for sick sinus syndrome,  comes back for follow up.        Patient denies of any symptoms of chest pain or shortness of breath     [facility] regarding diabetes dyslipidemia and thyroid problems     I advised patient to follow-up with PCP regarding history of asthma obstructive sleep apnea and shortness of breath  Cardiac wise appears to be stable if he have any recurrent problems he will contact me  Patient pacemaker was interrogated EKG showed AV pacer rhythm no changes in the pacemaker battery life 7 months  So advised him to come back in 2 months for recheck  He does have mostly isolated PVCs but patient denies of any palpitations  If any symptoms suggest stress test in the future    Patient comes back for follow-up  Pacemaker ASIA  Will schedule for generator change  Patient is having symptoms of vertigo will be followed by PCP regarding that    Patient comes back for follow-up generator change  The incision healed well no signs of infection  Follow-up again as scheduled in September  The following portions of the patient's history were reviewed and updated as appropriate: Allergies current medications past family history past medical history past social history past surgical history problem list and review of systems  Past Medical History:   Diagnosis Date   • Diabetes mellitus type I (CMS/HCC)    • Hyperlipidemia    • Hypertension    • Skin cancer    • Thyroid disease      Past Surgical History:   Procedure Laterality Date   • BACK SURGERY     • CARDIAC ELECTROPHYSIOLOGY PROCEDURE N/A 6/11/2021    Procedure: PPM generator change - dual;  Surgeon: Manuel Camacho MD;  Location:   "[facility];  Service: Cardiovascular;  Laterality: N/A;   • CARDIOVASCULAR STRESS TEST      Medications induced sress test   • CARPAL TUNNEL RELEASE     • CATARACT EXTRACTION     • COLONOSCOPY  09/29/2017   • GALLBLADDER SURGERY     • HERNIA REPAIR     • KNEE SURGERY     • PACEMAKER REPLACEMENT       /80 (BP Location: Left arm, Patient Position: Sitting, Cuff Size: Adult)   Pulse 64   Ht 180.3 cm (71\")   Wt 94.3 kg (208 lb)   SpO2 97%   BMI 29.01 kg/m²   Family History   Problem Relation Age of Onset   • Stroke Mother    • Heart attack Sister    • Cancer Sister         colon   • Cancer Brother         lung   • Alcohol abuse Brother    • No Known Problems Father    • No Known Problems Maternal Aunt    • No Known Problems Maternal Uncle    • No Known Problems Paternal Aunt    • No Known Problems Paternal Uncle    • No Known Problems Maternal Grandmother    • No Known Problems Maternal Grandfather    • No Known Problems Paternal Grandmother    • No Known Problems Paternal Grandfather    • No Known Problems Other    • Anemia Neg Hx    • Arrhythmia Neg Hx    • Asthma Neg Hx    • Clotting disorder Neg Hx    • Fainting Neg Hx    • Heart disease Neg Hx    • Heart failure Neg Hx    • Hyperlipidemia Neg Hx    • Hypertension Neg Hx        Current Outpatient Medications:   •  ACCU-CHEK FASTCLIX LANCETS misc, Use to check blood sugar twice daily, Disp: , Rfl:   •  alfuzosin (UROXATRAL) 10 MG 24 hr tablet, TAKE 1 TABLET EVERY DAY, Disp: 90 tablet, Rfl: 1  •  aspirin 81 MG tablet, ASPIRIN 81 MG ORAL TABLET, Disp: , Rfl:   •  atorvastatin (LIPITOR) 40 MG tablet, TAKE 1 TABLET EVERY DAY, Disp: 90 tablet, Rfl: 1  •  cetirizine (zyrTEC) 10 MG tablet, , Disp: , Rfl:   •  fluticasone (FLONASE) 50 MCG/ACT nasal spray, As Needed., Disp: , Rfl:   •  glucose blood (ACCU-CHEK GUIDE) test strip, Use to check blood sugar twice daily, Disp: , Rfl:   •  Insulin Glargine (LANTUS SOLOSTAR) 100 UNIT/ML injection pen, " LANTUS SOLOSTAR 100 UNIT/ML SOPN, Disp: , Rfl:   •  Insulin Pen Needle (B-D ULTRAFINE III SHORT PEN) 31G X 8 MM misc, BD PEN NEEDLE SHORT U/F 31G X 8 MM, Disp: , Rfl:   •  levothyroxine (Synthroid) 50 MCG tablet, Take 25 mcg by mouth Daily., Disp: , Rfl:   •  levothyroxine (SYNTHROID, LEVOTHROID) 25 MCG tablet, Take 12.5 mcg by mouth Daily., Disp: , Rfl:   •  meclizine (ANTIVERT) 25 MG tablet, Take 1 tablet by mouth 3 (Three) Times a Day As Needed for Dizziness., Disp: 15 tablet, Rfl: 0  •  metFORMIN (GLUCOPHAGE) 500 MG tablet, Take 500 mg by mouth 2 (Two) Times a Day With Meals., Disp: , Rfl:   •  montelukast (SINGULAIR) 10 MG tablet, TAKE 1 TABLET EVERY DAY, Disp: 90 tablet, Rfl: 3  •  Multiple Vitamins-Minerals (MULTI VITAMIN/MINERALS) tablet, MULTI VITAMIN/MINERALS TABS, Disp: , Rfl:   •  ondansetron ODT (ZOFRAN-ODT) 4 MG disintegrating tablet, Place 1 tablet on the tongue Every 8 (Eight) Hours As Needed for Nausea., Disp: 20 tablet, Rfl: 0  •  pantoprazole (PROTONIX) 40 MG EC tablet, TAKE 1 TABLET EVERY DAY, Disp: 90 tablet, Rfl: 1  •  vitamin B-12 (CYANOCOBALAMIN) 1000 MCG tablet, Take 1,000 mcg by mouth Daily., Disp: , Rfl:   Social History     Socioeconomic History   • Marital status:      Spouse name: Not on file   • Number of children: Not on file   • Years of education: Not on file   • Highest education level: Not on file   Tobacco Use   • Smoking status: Former Smoker   • Smokeless tobacco: Never Used   Vaping Use   • Vaping Use: Never used   Substance and Sexual Activity   • Alcohol use: Yes     Comment: occ   • Drug use: Never   • Sexual activity: Never     No Known Allergies  Review of Systems   Constitutional: Negative for fever and malaise/fatigue.   HENT: Negative for congestion and hearing loss.    Eyes: Negative for double vision and visual disturbance.   Cardiovascular: Negative for chest pain, claudication, dyspnea on exertion, leg swelling and syncope.   Respiratory: Negative for cough  and shortness of breath.    Endocrine: Negative for cold intolerance.   Skin: Negative for color change and rash.   Musculoskeletal: Negative for arthritis and joint pain.   Gastrointestinal: Negative for abdominal pain and heartburn.   Genitourinary: Negative for hematuria.   Neurological: Positive for dizziness (inner ear issue). Negative for excessive daytime sleepiness.   Psychiatric/Behavioral: Negative for depression. The patient is not nervous/anxious.    All other systems reviewed and are negative.             Objective:     Physical Exam  Vital stable neck no JVP elevation lungs clear heart sounds S1-S2 regular extremities no edema bilateral pulses present  Chest skin incision healed well  Procedures    Lab Review:       Assessment:          Diagnosis Plan   1. Hypertension, benign     2. Mixed hyperlipidemia     3. Presence of cardiac pacemaker            Plan:       MDM  Number of Diagnoses or Management Options  Hypertension, benign: established, improving  Mixed hyperlipidemia: established, improving  Presence of cardiac pacemaker: established, improving     Amount and/or Complexity of Data Reviewed  Review and summarize past medical records: yes    Risk of Complications, Morbidity, and/or Mortality  Presenting problems: moderate  Management options: moderate    Patient Progress  Patient progress: stable

## 2021-07-01 NOTE — TELEPHONE ENCOUNTER
Patient's monitor appears to be connected and active at this time. Called patient to confirm function and connection.

## 2021-07-23 ENCOUNTER — TELEPHONE (OUTPATIENT)
Dept: CARDIOLOGY | Facility: CLINIC | Age: 76
End: 2021-07-23

## 2021-07-23 RX ORDER — CEPHALEXIN 500 MG/1
500 CAPSULE ORAL 2 TIMES DAILY
Qty: 14 CAPSULE | Refills: 0 | Status: SHIPPED | OUTPATIENT
Start: 2021-07-23 | End: 2021-07-30

## 2021-07-23 NOTE — TELEPHONE ENCOUNTER
Patient has red area to lateral aspect of incision at PM replacement site. Spoke to Dr Moss, ordered Keflex 500 mg BID x 1 week and patient to see Dr Camacho on Monday. Appt made for 11:30. Spoke to patient and confirmed.

## 2021-07-26 ENCOUNTER — OFFICE VISIT (OUTPATIENT)
Dept: CARDIOLOGY | Facility: CLINIC | Age: 76
End: 2021-07-26

## 2021-07-26 VITALS
OXYGEN SATURATION: 97 % | HEART RATE: 76 BPM | WEIGHT: 208 LBS | SYSTOLIC BLOOD PRESSURE: 134 MMHG | HEIGHT: 71 IN | BODY MASS INDEX: 29.12 KG/M2 | DIASTOLIC BLOOD PRESSURE: 78 MMHG

## 2021-07-26 DIAGNOSIS — I10 HYPERTENSION, BENIGN: ICD-10-CM

## 2021-07-26 DIAGNOSIS — E78.2 MIXED HYPERLIPIDEMIA: ICD-10-CM

## 2021-07-26 DIAGNOSIS — G47.30 SLEEP APNEA, UNSPECIFIED TYPE: ICD-10-CM

## 2021-07-26 DIAGNOSIS — E10.65 TYPE 1 DIABETES MELLITUS WITH HYPERGLYCEMIA (HCC): ICD-10-CM

## 2021-07-26 DIAGNOSIS — I49.5 SICK SINUS SYNDROME (HCC): ICD-10-CM

## 2021-07-26 DIAGNOSIS — Z95.0 PRESENCE OF CARDIAC PACEMAKER: Primary | ICD-10-CM

## 2021-07-26 PROCEDURE — 99024 POSTOP FOLLOW-UP VISIT: CPT | Performed by: INTERNAL MEDICINE

## 2021-07-26 NOTE — PROGRESS NOTES
Encounter Date:07/26/2021      Patient ID: Floyd Herrera is a 75 y.o. male.    Chief Complaint:  Status post pacemaker.  Hypertension  Dyslipidemia  Diabetes    History of Present Illness  Patient had pacemaker pulse and replacement 6/11/2021.  Patient was doing well until few days ago when he started having redness around the lateral portion of the incision.  Patient was seen by primary cardiologist Dr. Moss on Friday, 7/23/2021 and was started on Keflex.  Patient has improved significantly and the redness has improved.    Since I have last seen, the patient has been without any chest discomfort ,shortness of breath, palpitations, dizziness or syncope.  Denies having any headache ,abdominal pain ,nausea, vomiting , diarrhea constipation, loss of weight or loss of appetite.  Denies having any excessive bruising ,hematuria or blood in the stool.    Review of all systems negative except as indicated.    Reviewed ROS.    Assessment and Plan     ]]]]]]]]]]]]]]]]  Impression  ===========  -Status post permanent dual-chamber pacemaker (sick sinus syndrome).  [employer] MRI compatible 9/7/2020  Status post pacemaker pulse generator replacement (6/11/2021)    -Dyslipidemia diabetes hypertension history of asthma obstructive sleep apnea hypothyroidism    -Status post back surgery carpal tunnel surgery cholecystectomy hernia surgery knee replacement    -Family history of coronary artery disease    -Former smoker    -No known allergies  ============  Plan  ==========  Patient recently had redness over the lateral portion of the incision probably due to superficial infection and possible small stitch abscess.  Patient has responded well to Keflex 500 mg twice a day.  Patient to take antibiotics for a total of 2 weeks.  Follow-up in the office in about 8 weeks.  Medications were reviewed and updated.  Further plan will depend on patient's progress.  ]]]]]]]]]]]]]]]]]]           Diagnosis Plan   1. Presence of cardiac  pacemaker     2. Sick sinus syndrome (CMS/HCC)     3. Hypertension, benign     4. Mixed hyperlipidemia     5. Type 1 diabetes mellitus with hyperglycemia (CMS/HCC)     6. Sleep apnea, unspecified type     LAB RESULTS (LAST 7 DAYS)    CBC        BMP        CMP         BNP        TROPONIN        CoAg        Creatinine Clearance  CrCl cannot be calculated (Patient's most recent lab result is older than the maximum 30 days allowed.).    ABG        Radiology  No radiology results for the last day                The following portions of the patient's history were reviewed and updated as appropriate: allergies, current medications, past family history, past medical history, past social history, past surgical history and problem list.    Review of Systems   Constitutional: Negative for malaise/fatigue.   Cardiovascular: Negative for chest pain, leg swelling, palpitations and syncope.   Respiratory: Negative for shortness of breath.    Skin: Negative for rash.   Gastrointestinal: Negative for nausea and vomiting.   Neurological: Negative for dizziness, light-headedness and numbness.         Current Outpatient Medications:   •  ACCU-CHEK FASTCLIX LANCETS misc, Use to check blood sugar twice daily, Disp: , Rfl:   •  alfuzosin (UROXATRAL) 10 MG 24 hr tablet, TAKE 1 TABLET EVERY DAY, Disp: 90 tablet, Rfl: 1  •  aspirin 81 MG tablet, ASPIRIN 81 MG ORAL TABLET, Disp: , Rfl:   •  atorvastatin (LIPITOR) 40 MG tablet, TAKE 1 TABLET EVERY DAY, Disp: 90 tablet, Rfl: 1  •  cephalexin (Keflex) 500 MG capsule, Take 1 capsule by mouth 2 (Two) Times a Day for 7 days., Disp: 14 capsule, Rfl: 0  •  cetirizine (zyrTEC) 10 MG tablet, , Disp: , Rfl:   •  fluticasone (FLONASE) 50 MCG/ACT nasal spray, As Needed., Disp: , Rfl:   •  glucose blood (ACCU-CHEK GUIDE) test strip, Use to check blood sugar twice daily, Disp: , Rfl:   •  Insulin Glargine (LANTUS SOLOSTAR) 100 UNIT/ML injection pen, LANTUS SOLOSTAR 100 UNIT/ML SOPN, Disp: , Rfl:   •   Insulin Pen Needle (B-D ULTRAFINE III SHORT PEN) 31G X 8 MM misc, BD PEN NEEDLE SHORT U/F 31G X 8 MM, Disp: , Rfl:   •  levothyroxine (Synthroid) 50 MCG tablet, Take 25 mcg by mouth Daily., Disp: , Rfl:   •  levothyroxine (SYNTHROID, LEVOTHROID) 25 MCG tablet, Take 12.5 mcg by mouth Daily., Disp: , Rfl:   •  meclizine (ANTIVERT) 25 MG tablet, Take 1 tablet by mouth 3 (Three) Times a Day As Needed for Dizziness., Disp: 15 tablet, Rfl: 0  •  metFORMIN (GLUCOPHAGE) 500 MG tablet, Take 500 mg by mouth 2 (Two) Times a Day With Meals., Disp: , Rfl:   •  montelukast (SINGULAIR) 10 MG tablet, TAKE 1 TABLET EVERY DAY, Disp: 90 tablet, Rfl: 3  •  Multiple Vitamins-Minerals (MULTI VITAMIN/MINERALS) tablet, MULTI VITAMIN/MINERALS TABS, Disp: , Rfl:   •  ondansetron ODT (ZOFRAN-ODT) 4 MG disintegrating tablet, Place 1 tablet on the tongue Every 8 (Eight) Hours As Needed for Nausea., Disp: 20 tablet, Rfl: 0  •  pantoprazole (PROTONIX) 40 MG EC tablet, TAKE 1 TABLET EVERY DAY, Disp: 90 tablet, Rfl: 1  •  vitamin B-12 (CYANOCOBALAMIN) 1000 MCG tablet, Take 1,000 mcg by mouth Daily., Disp: , Rfl:     No Known Allergies    Family History   Problem Relation Age of Onset   • Stroke Mother    • Heart attack Sister    • Cancer Sister         colon   • Cancer Brother         lung   • Alcohol abuse Brother    • No Known Problems Father    • No Known Problems Maternal Aunt    • No Known Problems Maternal Uncle    • No Known Problems Paternal Aunt    • No Known Problems Paternal Uncle    • No Known Problems Maternal Grandmother    • No Known Problems Maternal Grandfather    • No Known Problems Paternal Grandmother    • No Known Problems Paternal Grandfather    • No Known Problems Other    • Anemia Neg Hx    • Arrhythmia Neg Hx    • Asthma Neg Hx    • Clotting disorder Neg Hx    • Fainting Neg Hx    • Heart disease Neg Hx    • Heart failure Neg Hx    • Hyperlipidemia Neg Hx    • Hypertension Neg Hx        Past Surgical History:   Procedure  Laterality Date   • BACK SURGERY     • CARDIAC ELECTROPHYSIOLOGY PROCEDURE N/A 6/11/2021    Procedure: PPM generator change - dual;  Surgeon: Manuel Camacho MD;  Location: [facility] INVASIVE LOCATION;  Service: Cardiovascular;  Laterality: N/A;   • CARDIOVASCULAR STRESS TEST      Medications induced sress test   • CARPAL TUNNEL RELEASE     • CATARACT EXTRACTION     • COLONOSCOPY  09/29/2017   • GALLBLADDER SURGERY     • HERNIA REPAIR     • KNEE SURGERY     • PACEMAKER REPLACEMENT         Past Medical History:   Diagnosis Date   • Diabetes mellitus type I (CMS/HCC)    • Hyperlipidemia    • Hypertension    • Skin cancer    • Thyroid disease        Family History   Problem Relation Age of Onset   • Stroke Mother    • Heart attack Sister    • Cancer Sister         colon   • Cancer Brother         lung   • Alcohol abuse Brother    • No Known Problems Father    • No Known Problems Maternal Aunt    • No Known Problems Maternal Uncle    • No Known Problems Paternal Aunt    • No Known Problems Paternal Uncle    • No Known Problems Maternal Grandmother    • No Known Problems Maternal Grandfather    • No Known Problems Paternal Grandmother    • No Known Problems Paternal Grandfather    • No Known Problems Other    • Anemia Neg Hx    • Arrhythmia Neg Hx    • Asthma Neg Hx    • Clotting disorder Neg Hx    • Fainting Neg Hx    • Heart disease Neg Hx    • Heart failure Neg Hx    • Hyperlipidemia Neg Hx    • Hypertension Neg Hx        Social History     Socioeconomic History   • Marital status:      Spouse name: Not on file   • Number of children: Not on file   • Years of education: Not on file   • Highest education level: Not on file   Tobacco Use   • Smoking status: Former Smoker   • Smokeless tobacco: Never Used   Vaping Use   • Vaping Use: Never used   Substance and Sexual Activity   • Alcohol use: Yes     Comment: occ   • Drug use: Never   • Sexual activity: Never         Procedures      Objective:       Physical  "Exam    /78 (BP Location: Left arm, Patient Position: Sitting, Cuff Size: Large Adult)   Pulse 76   Ht 180.3 cm (71\")   Wt 94.3 kg (208 lb)   SpO2 97%   BMI 29.01 kg/m²   The patient is alert, oriented and in no distress.    Vital signs as noted above.    Head and neck revealed no carotid bruits or jugular venous distension.  No thyromegaly or lymphadenopathy is present.    Lungs clear.  No wheezing.  Breath sounds are normal bilaterally.    Heart normal first and second heart sounds.  No murmur..  No pericardial rub is present.  No gallop is present.    Abdomen soft and nontender.  No organomegaly is present.    Extremities revealed good peripheral pulses without any pedal edema.    Skin warm and dry.  Lateral portion of the pacemaker incision is healing well redness has significantly decreased compared to 7/23/2021    Musculoskeletal system is grossly normal.    CNS grossly normal.        "

## 2021-08-21 PROCEDURE — 93296 REM INTERROG EVL PM/IDS: CPT | Performed by: INTERNAL MEDICINE

## 2021-08-21 PROCEDURE — 93294 REM INTERROG EVL PM/LDLS PM: CPT | Performed by: INTERNAL MEDICINE

## 2021-08-30 ENCOUNTER — CLINICAL SUPPORT NO REQUIREMENTS (OUTPATIENT)
Dept: CARDIOLOGY | Facility: CLINIC | Age: 76
End: 2021-08-30

## 2021-08-30 ENCOUNTER — OFFICE VISIT (OUTPATIENT)
Dept: CARDIOLOGY | Facility: CLINIC | Age: 76
End: 2021-08-30

## 2021-08-30 VITALS
SYSTOLIC BLOOD PRESSURE: 123 MMHG | DIASTOLIC BLOOD PRESSURE: 75 MMHG | BODY MASS INDEX: 29.96 KG/M2 | WEIGHT: 214 LBS | HEIGHT: 71 IN | OXYGEN SATURATION: 97 % | HEART RATE: 67 BPM

## 2021-08-30 DIAGNOSIS — I49.5 SICK SINUS SYNDROME (HCC): ICD-10-CM

## 2021-08-30 DIAGNOSIS — E78.2 MIXED HYPERLIPIDEMIA: ICD-10-CM

## 2021-08-30 DIAGNOSIS — E10.65 TYPE 1 DIABETES MELLITUS WITH HYPERGLYCEMIA (HCC): ICD-10-CM

## 2021-08-30 DIAGNOSIS — Z95.0 PRESENCE OF CARDIAC PACEMAKER: Primary | ICD-10-CM

## 2021-08-30 DIAGNOSIS — G47.30 SLEEP APNEA, UNSPECIFIED TYPE: ICD-10-CM

## 2021-08-30 DIAGNOSIS — I10 HYPERTENSION, BENIGN: ICD-10-CM

## 2021-08-30 PROCEDURE — 99024 POSTOP FOLLOW-UP VISIT: CPT | Performed by: INTERNAL MEDICINE

## 2021-08-30 NOTE — PROGRESS NOTES
Encounter Date:08/30/2021  Last seen 7/26/2021      Patient ID: Floyd Herrera is a 75 y.o. male.    Chief Complaint:  Status post pacemaker.  Hypertension  Dyslipidemia  Diabetes     History of Present Illness  Since I have last seen, the patient has been without any chest discomfort ,shortness of breath, palpitations, dizziness or syncope.  Denies having any headache ,abdominal pain ,nausea, vomiting , diarrhea constipation, loss of weight or loss of appetite.  Denies having any excessive bruising ,hematuria or blood in the stool.    Review of all systems negative except as indicated.    Reviewed ROS.  Assessment and Plan      ]]]]]]]]]]]]]]]]  Impression  ===========  -Status post permanent dual-chamber pacemaker (sick sinus syndrome).  [employer] MRI compatible 9/7/2020.    Status post pacemaker pulse generator replacement (6/11/2021)     -Dyslipidemia diabetes hypertension history of asthma obstructive sleep apnea hypothyroidism     -Status post back surgery carpal tunnel surgery cholecystectomy hernia surgery knee replacement     -Family history of coronary artery disease     -Former smoker     -No known allergies  ============  Plan  ==========  Status post pacemaker  Pacemaker site looks normal.  Interrogation of the pacemaker revealed excellent pacing parameters.  Battery status 9.1 years.    Hypertension    Dyslipidemia-continue atorvastatin    Diabetes-on insulin and Metformin    Medications were reviewed and updated.    Follow-up in the office in 6 months with pacemaker interrogation.  Further plan will depend on patient's progress.  ]]]]]]]]]]]]]]]]]]                      Diagnosis Plan   1. Presence of cardiac pacemaker     2. Sick sinus syndrome (CMS/HCC)     3. Hypertension, benign     4. Mixed hyperlipidemia     5. Type 1 diabetes mellitus with hyperglycemia (CMS/HCC)     6. Sleep apnea, unspecified type     LAB RESULTS (LAST 7 DAYS)    CBC        BMP        CMP         BNP        TROPONIN         CoAg        Creatinine Clearance  CrCl cannot be calculated (Patient's most recent lab result is older than the maximum 30 days allowed.).    ABG        Radiology  No radiology results for the last day                The following portions of the patient's history were reviewed and updated as appropriate: allergies, current medications, past family history, past medical history, past social history, past surgical history and problem list.    Review of Systems   Constitutional: Negative for fever and malaise/fatigue.   Cardiovascular: Negative for chest pain, dyspnea on exertion and palpitations.   Respiratory: Negative for cough and shortness of breath.    Skin: Negative for rash.   Gastrointestinal: Negative for abdominal pain, nausea and vomiting.   Neurological: Negative for focal weakness and headaches.   All other systems reviewed and are negative.        Current Outpatient Medications:   •  ACCU-CHEK FASTCLIX LANCETS misc, Use to check blood sugar twice daily, Disp: , Rfl:   •  alfuzosin (UROXATRAL) 10 MG 24 hr tablet, TAKE 1 TABLET EVERY DAY, Disp: 90 tablet, Rfl: 1  •  aspirin 81 MG tablet, ASPIRIN 81 MG ORAL TABLET, Disp: , Rfl:   •  atorvastatin (LIPITOR) 40 MG tablet, TAKE 1 TABLET EVERY DAY, Disp: 90 tablet, Rfl: 1  •  cetirizine (zyrTEC) 10 MG tablet, , Disp: , Rfl:   •  fluticasone (FLONASE) 50 MCG/ACT nasal spray, As Needed., Disp: , Rfl:   •  glucose blood (ACCU-CHEK GUIDE) test strip, Use to check blood sugar twice daily, Disp: , Rfl:   •  Insulin Glargine (LANTUS SOLOSTAR) 100 UNIT/ML injection pen, LANTUS SOLOSTAR 100 UNIT/ML SOPN, Disp: , Rfl:   •  Insulin Pen Needle (B-D ULTRAFINE III SHORT PEN) 31G X 8 MM misc, BD PEN NEEDLE SHORT U/F 31G X 8 MM, Disp: , Rfl:   •  levothyroxine (SYNTHROID, LEVOTHROID) 25 MCG tablet, Take 12.5 mcg by mouth Daily., Disp: , Rfl:   •  meclizine (ANTIVERT) 25 MG tablet, Take 1 tablet by mouth 3 (Three) Times a Day As Needed for Dizziness., Disp: 15 tablet, Rfl:  0  •  metFORMIN (GLUCOPHAGE) 500 MG tablet, Take 500 mg by mouth 2 (Two) Times a Day With Meals., Disp: , Rfl:   •  montelukast (SINGULAIR) 10 MG tablet, TAKE 1 TABLET EVERY DAY, Disp: 90 tablet, Rfl: 3  •  Multiple Vitamins-Minerals (MULTI VITAMIN/MINERALS) tablet, MULTI VITAMIN/MINERALS TABS, Disp: , Rfl:   •  ondansetron ODT (ZOFRAN-ODT) 4 MG disintegrating tablet, Place 1 tablet on the tongue Every 8 (Eight) Hours As Needed for Nausea., Disp: 20 tablet, Rfl: 0  •  pantoprazole (PROTONIX) 40 MG EC tablet, TAKE 1 TABLET EVERY DAY, Disp: 90 tablet, Rfl: 1  •  vitamin B-12 (CYANOCOBALAMIN) 1000 MCG tablet, Take 1,000 mcg by mouth Daily., Disp: , Rfl:     No Known Allergies    Family History   Problem Relation Age of Onset   • Stroke Mother    • Heart attack Sister    • Cancer Sister         colon   • Cancer Brother         lung   • Alcohol abuse Brother    • No Known Problems Father    • No Known Problems Maternal Aunt    • No Known Problems Maternal Uncle    • No Known Problems Paternal Aunt    • No Known Problems Paternal Uncle    • No Known Problems Maternal Grandmother    • No Known Problems Maternal Grandfather    • No Known Problems Paternal Grandmother    • No Known Problems Paternal Grandfather    • No Known Problems Other    • Anemia Neg Hx    • Arrhythmia Neg Hx    • Asthma Neg Hx    • Clotting disorder Neg Hx    • Fainting Neg Hx    • Heart disease Neg Hx    • Heart failure Neg Hx    • Hyperlipidemia Neg Hx    • Hypertension Neg Hx        Past Surgical History:   Procedure Laterality Date   • BACK SURGERY     • CARDIAC ELECTROPHYSIOLOGY PROCEDURE N/A 6/11/2021    Procedure: PPM generator change - dual;  Surgeon: Manuel Camacho MD;  Location: [facility] INVASIVE LOCATION;  Service: Cardiovascular;  Laterality: N/A;   • CARDIOVASCULAR STRESS TEST      Medications induced sress test   • CARPAL TUNNEL RELEASE     • CATARACT EXTRACTION     • COLONOSCOPY  09/29/2017   • GALLBLADDER SURGERY     • HERNIA  "REPAIR     • KNEE SURGERY     • PACEMAKER REPLACEMENT         Past Medical History:   Diagnosis Date   • Diabetes mellitus type I (CMS/HCC)    • Hyperlipidemia    • Hypertension    • Skin cancer    • Thyroid disease        Family History   Problem Relation Age of Onset   • Stroke Mother    • Heart attack Sister    • Cancer Sister         colon   • Cancer Brother         lung   • Alcohol abuse Brother    • No Known Problems Father    • No Known Problems Maternal Aunt    • No Known Problems Maternal Uncle    • No Known Problems Paternal Aunt    • No Known Problems Paternal Uncle    • No Known Problems Maternal Grandmother    • No Known Problems Maternal Grandfather    • No Known Problems Paternal Grandmother    • No Known Problems Paternal Grandfather    • No Known Problems Other    • Anemia Neg Hx    • Arrhythmia Neg Hx    • Asthma Neg Hx    • Clotting disorder Neg Hx    • Fainting Neg Hx    • Heart disease Neg Hx    • Heart failure Neg Hx    • Hyperlipidemia Neg Hx    • Hypertension Neg Hx        Social History     Socioeconomic History   • Marital status:      Spouse name: Not on file   • Number of children: Not on file   • Years of education: Not on file   • Highest education level: Not on file   Tobacco Use   • Smoking status: Former Smoker   • Smokeless tobacco: Never Used   Vaping Use   • Vaping Use: Never used   Substance and Sexual Activity   • Alcohol use: Yes     Comment: occ   • Drug use: Never   • Sexual activity: Never         Procedures      Objective:       Physical Exam    /75 (BP Location: Left arm, Patient Position: Sitting, Cuff Size: Adult)   Pulse 67   Ht 180.3 cm (71\")   Wt 97.1 kg (214 lb)   SpO2 97%   BMI 29.85 kg/m²   The patient is alert, oriented and in no distress.    Vital signs as noted above.    Head and neck revealed no carotid bruits or jugular venous distension.  No thyromegaly or lymphadenopathy is present.    Lungs clear.  No wheezing.  Breath sounds are normal " bilaterally.    Heart normal first and second heart sounds.  No murmur..  No pericardial rub is present.  No gallop is present.    Abdomen soft and nontender.  No organomegaly is present.    Extremities revealed good peripheral pulses without any pedal edema.    Skin warm and dry.  Pacemaker site looks normal.    Musculoskeletal system is grossly normal.    CNS grossly normal.    Reviewed and unchanged from last visit.

## 2021-08-31 PROCEDURE — 93280 PM DEVICE PROGR EVAL DUAL: CPT | Performed by: INTERNAL MEDICINE

## 2021-09-09 ENCOUNTER — LAB (OUTPATIENT)
Dept: LAB | Facility: HOSPITAL | Age: 76
End: 2021-09-09

## 2021-09-09 ENCOUNTER — TRANSCRIBE ORDERS (OUTPATIENT)
Dept: ADMINISTRATIVE | Facility: HOSPITAL | Age: 76
End: 2021-09-09

## 2021-09-09 DIAGNOSIS — Z11.52 ENCOUNTER FOR SCREENING FOR COVID-19: ICD-10-CM

## 2021-09-09 DIAGNOSIS — Z11.52 ENCOUNTER FOR SCREENING FOR COVID-19: Primary | ICD-10-CM

## 2021-09-09 LAB — SARS-COV-2 ORF1AB RESP QL NAA+PROBE: NOT DETECTED

## 2021-09-09 PROCEDURE — C9803 HOPD COVID-19 SPEC COLLECT: HCPCS

## 2021-09-09 PROCEDURE — U0004 COV-19 TEST NON-CDC HGH THRU: HCPCS

## 2021-09-28 ENCOUNTER — OFFICE VISIT (OUTPATIENT)
Dept: FAMILY MEDICINE CLINIC | Facility: CLINIC | Age: 76
End: 2021-09-28

## 2021-09-28 VITALS
RESPIRATION RATE: 18 BRPM | DIASTOLIC BLOOD PRESSURE: 77 MMHG | TEMPERATURE: 96.9 F | HEART RATE: 62 BPM | WEIGHT: 206 LBS | BODY MASS INDEX: 28.84 KG/M2 | HEIGHT: 71 IN | OXYGEN SATURATION: 96 % | SYSTOLIC BLOOD PRESSURE: 134 MMHG

## 2021-09-28 DIAGNOSIS — R19.7 DIARRHEA OF PRESUMED INFECTIOUS ORIGIN: Primary | ICD-10-CM

## 2021-09-28 PROCEDURE — 99213 OFFICE O/P EST LOW 20 MIN: CPT | Performed by: FAMILY MEDICINE

## 2021-09-28 NOTE — PROGRESS NOTES
"Chief Complaint  Diarrhea (Sx per 4 days) and Abdominal Pain (LLQ)    Subjective          Floyd Herrera presents to [facility]  He has had 2 episodes recently of diarrhea.  The most recent time has been for about 5 days.  Left sided abdominal pain. Occasional incontinency due to sudden symptoms.  He is scheduled for a prostate biopsy later this week with Dr. Jones.  No recent travel, no suspect food, wife is not ill. Negative COVID test.    Diarrhea   This is a recurrent problem. The current episode started in the past 7 days. The problem occurs 2 to 4 times per day. The problem has been unchanged. The stool consistency is described as watery. Associated symptoms include abdominal pain, a fever and headaches. Pertinent negatives include no arthralgias, increased  flatus, myalgias or vomiting. The symptoms are aggravated by stress. He has tried anti-motility drug for the symptoms. The treatment provided no relief.   Abdominal Pain  This is a recurrent problem. The current episode started more than 1 month ago. The problem has been resolved. The pain is located in the LUQ, left flank and LLQ. The pain is at a severity of 7/10. The quality of the pain is cramping and sharp. The abdominal pain does not radiate. Associated symptoms include anorexia, diarrhea, a fever, frequency, headaches and nausea. Pertinent negatives include no arthralgias, belching, constipation, flatus, hematochezia, hematuria, myalgias or vomiting. Nothing aggravates the pain. The pain is relieved by nothing.       Objective   Vital Signs:   /77   Pulse 62   Temp 96.9 °F (36.1 °C)   Resp 18   Ht 180.3 cm (71\")   Wt 93.4 kg (206 lb)   SpO2 96%   BMI 28.73 kg/m²     Physical Exam  Constitutional:       General: He is not in acute distress.     Appearance: He is well-developed.   HENT:      Head: Normocephalic.   Eyes:      General: Lids are normal.      Conjunctiva/sclera: Conjunctivae normal. "   Neck:      Thyroid: No thyroid mass or thyromegaly.      Trachea: Trachea normal.   Cardiovascular:      Rate and Rhythm: Normal rate and regular rhythm.      Heart sounds: Normal heart sounds.   Pulmonary:      Effort: Pulmonary effort is normal.      Breath sounds: Normal breath sounds.   Abdominal:      Palpations: Abdomen is soft.      Tenderness: There is abdominal tenderness in the left upper quadrant and left lower quadrant.   Musculoskeletal:      Cervical back: Normal range of motion.   Lymphadenopathy:      Cervical: No cervical adenopathy.   Skin:     General: Skin is warm and dry.   Neurological:      Mental Status: He is alert and oriented to person, place, and time.   Psychiatric:         Attention and Perception: He is attentive.         Mood and Affect: Mood normal.         Speech: Speech normal.         Behavior: Behavior normal.        Result Review :   The following data was reviewed by: Tammy Rodas MD on 09/28/2021:  Common labs    Common Labsle 12/7/20 12/7/20 12/7/20 12/7/20 1/20/21 6/4/21 6/4/21 6/4/21 6/4/21 6/4/21 6/4/21    [id number] [id number]  0826 0826 0826 0826 0927 0927   Glucose   103 (A)    124 (A)    116 (A)   BUN   19    16    16   Creatinine   1.03    1.01    1.08   eGFR Non African Am   71    72    67   Sodium   137    141    139   Potassium   4.9    4.9    4.5   Chloride   100    104    106   Calcium   9.9    9.5    9.4   Albumin   4.90    4.40    4.30   Total Bilirubin   0.5    1.0    1.0   Alkaline Phosphatase   68    62    66   AST (SGOT)   23    27    26   ALT (SGPT)   24    25    23   WBC          6.20    Hemoglobin          14.6    Hematocrit          42.8    Platelets          173    Total Cholesterol  146    112        Triglycerides  77    63        HDL Cholesterol  52    42        LDL Cholesterol   79    56        Hemoglobin A1C 6.5 (A)        6.2 (A)     Microalbumin, Urine    3.0    1.5      PSA     4.310 (A)         (A) Abnormal value                       Assessment and Plan    Diagnoses and all orders for this visit:    1. Diarrhea of presumed infectious origin (Primary)  -     metroNIDAZOLE (Flagyl) 500 MG tablet; Take 1 tablet by mouth 3 (Three) Times a Day.  Dispense: 21 tablet; Refill: 0  -     diphenoxylate-atropine (Lomotil) 2.5-0.025 MG per tablet; Take 1 tablet by mouth 4 (Four) Times a Day As Needed for Diarrhea.  Dispense: 60 tablet; Refill: 0        Follow Up   No follow-ups on file.  Patient was given instructions and counseling regarding his condition or for health maintenance advice. Please see specific information pulled into the AVS if appropriate.       Answers for HPI/ROS submitted by the patient on 9/28/2021  What is the primary reason for your visit?: Abdominal Pain

## 2021-09-29 ENCOUNTER — TELEPHONE (OUTPATIENT)
Dept: FAMILY MEDICINE CLINIC | Facility: CLINIC | Age: 76
End: 2021-09-29

## 2021-09-29 RX ORDER — DIPHENOXYLATE HYDROCHLORIDE AND ATROPINE SULFATE 2.5; .025 MG/1; MG/1
1 TABLET ORAL 4 TIMES DAILY PRN
Qty: 60 TABLET | Refills: 0 | Status: SHIPPED | OUTPATIENT
Start: 2021-09-29

## 2021-09-29 RX ORDER — METRONIDAZOLE 500 MG/1
500 TABLET ORAL 3 TIMES DAILY
Qty: 21 TABLET | Refills: 0 | Status: SHIPPED | OUTPATIENT
Start: 2021-09-29 | End: 2021-10-26 | Stop reason: HOSPADM

## 2021-09-29 NOTE — TELEPHONE ENCOUNTER
Caller: Kalina Herrera    Relationship to patient: Emergency Contact    Best call back number: [phone]    Patient is needing: PATIENTS WIFE IS CALLING IN REGARDS TO THE MEDICATIONS THAT WERE SUPPOSED TO BE CALLED INTO HIS LOCAL [facility] FOR HIS STOMACH ISSUES. SHE STATED THAT SHE SENT A [employer] MESSAGE BACK IN REGARDS TO THIS BUT NO ANSWER, SO SHE IS NOW CALLING.

## 2021-09-30 ENCOUNTER — TREATMENT (OUTPATIENT)
Dept: PHYSICAL THERAPY | Facility: CLINIC | Age: 76
End: 2021-09-30

## 2021-09-30 DIAGNOSIS — R42 DIZZINESS AND GIDDINESS: ICD-10-CM

## 2021-09-30 DIAGNOSIS — H81.10 BENIGN PAROXYSMAL POSITIONAL VERTIGO, UNSPECIFIED LATERALITY: Primary | ICD-10-CM

## 2021-09-30 PROCEDURE — 97530 THERAPEUTIC ACTIVITIES: CPT | Performed by: PHYSICAL THERAPIST

## 2021-09-30 PROCEDURE — 97110 THERAPEUTIC EXERCISES: CPT | Performed by: PHYSICAL THERAPIST

## 2021-09-30 PROCEDURE — 97162 PT EVAL MOD COMPLEX 30 MIN: CPT | Performed by: PHYSICAL THERAPIST

## 2021-09-30 PROCEDURE — 95992 CANALITH REPOSITIONING PROC: CPT | Performed by: PHYSICAL THERAPIST

## 2021-09-30 NOTE — PROGRESS NOTES
Physical Therapy Initial Evaluation and Plan of Care    Patient: Floyd Herrera   : 1945  Diagnosis/ICD-10 Code:  Benign paroxysmal positional vertigo, unspecified laterality [H81.10]  Referring practitioner: Connor Louis MD  Date of Initial Visit: 2021  Today's Date: 2021  Patient seen for 1 sessions             Subjective Evaluation    History of Present Illness  Mechanism of injury: Patient is a 76 y.o. WM who presents with c/o intermittent vertigo with position changes.  Goals are to return to previous level of function, decrease dizziness and learn how to prevent it in the future.  Vertigo started intermittently for the past couple years, then bad episode in January while lying on his side under the sink (plumbing)  with subsequent testing by ENT/audiology, [facility].  Negative for BP issues.  Wife present for evaluation.    Patient Goals  Patient goals for therapy: improved balance and return to sport/leisure activities  Patient goal: decrease dizziness           Objective DHI indicates 46% impairment with limitations in getting in/out of bed, looking up/down, quick head movements, rolling in bed.  Cervical AROM is WNL without symptom aggravation.  Cervical instability negative.  Negative central vestibular signs (smooth pursuit, saccades, resting nystagmus).  Positive peripheral vestibular signs (VOR, head thrust).  Modified CTSIB = 3/4 (30,30,30,5) indicating decreased sensory integration in balance with vestibular weakness and visual dependence.  Modified DGI= 10/12 (3,3, 2, 2) indicating fall risk.  SLS = 30 L, 30 R.    BPPV testing: Whitetail Halpike L -, R -; Roll test L-, R-  Performed CRT Emery-Jamar         Assessment & Plan     Assessment  Impairments: abnormal gait, activity intolerance, impaired balance and lacks appropriate home exercise program  Functional Limitations: walking, moving in bed and standing  Goals  Plan Goals: Patient independent with HEP in 2 weeks  Able  to decrease dizziness 50% in 2 weeks  Able to maintain balance on foam EC NBOS in 2 weeks  Improve function as evidenced by DHI score of 20% or less by discharge (46% impairment initially)  Able to return to work by discharge        Plan  Therapy options: will be seen for skilled physical therapy services  Other planned modality interventions: canal repositioning techniques  Planned therapy interventions: manual therapy, balance/weight-bearing training, functional ROM exercises, gait training, home exercise program, therapeutic activities, postural training and neuromuscular re-education  Treatment plan discussed with: patient and caregiver  Plan details: Plan to continue 1-2x's per week up to 8 visits if needed.        Timed:         Manual Therapy:         mins  44442;     Therapeutic Exercise:    15     mins  21597;     Neuromuscular Barrera:        mins  23323;    Therapeutic Activity:     15     mins  38298;     Gait Training:           mins  64804;     Ultrasound:          mins  62427;    Ionto                                   mins   81164  Self-care  ____ mins 79496    Un-Timed:  Electrical Stimulation:         mins  74463 (MC );  Dry Needling          mins self-pay  Traction          mins 52868  Low Eval          Mins  84127  Mod Eval     15     Mins  21980  High Eval                            Mins  40663  Canal repositioning __10___ mins  04681        Timed Treatment:   30   mins   Total Treatment:     55   mins    PT SIGNATURE: Robin A Sprigler, PT   DATE TREATMENT INITIATED: 9/30/2021    Initial Certification  Certification Period: 12/29/2021  I certify that the therapy services are furnished while this patient is under my care.  The services outlined above are required by this patient, and will be reviewed every 90 days.     PHYSICIAN:  Connor Louis MD_____________________________________________________________________________________________________________     DATE:  _________________________________________________________________________________________________________________________________    Please sign and return via fax to [phone]. Thank you, [facility].

## 2021-10-08 RX ORDER — ALFUZOSIN HYDROCHLORIDE 10 MG/1
TABLET, EXTENDED RELEASE ORAL
Qty: 90 TABLET | Refills: 1 | Status: SHIPPED | OUTPATIENT
Start: 2021-10-08 | End: 2022-03-04

## 2021-10-08 RX ORDER — PANTOPRAZOLE SODIUM 40 MG/1
TABLET, DELAYED RELEASE ORAL
Qty: 90 TABLET | Refills: 1 | Status: SHIPPED | OUTPATIENT
Start: 2021-10-08 | End: 2022-03-04

## 2021-10-08 RX ORDER — ATORVASTATIN CALCIUM 40 MG/1
TABLET, FILM COATED ORAL
Qty: 90 TABLET | Refills: 1 | Status: SHIPPED | OUTPATIENT
Start: 2021-10-08 | End: 2022-03-04

## 2021-10-21 ENCOUNTER — TREATMENT (OUTPATIENT)
Dept: PHYSICAL THERAPY | Facility: CLINIC | Age: 76
End: 2021-10-21

## 2021-10-21 DIAGNOSIS — H81.10 BENIGN PAROXYSMAL POSITIONAL VERTIGO, UNSPECIFIED LATERALITY: Primary | ICD-10-CM

## 2021-10-21 DIAGNOSIS — R42 DIZZINESS AND GIDDINESS: ICD-10-CM

## 2021-10-21 PROCEDURE — 97110 THERAPEUTIC EXERCISES: CPT | Performed by: PHYSICAL THERAPIST

## 2021-10-21 PROCEDURE — 97530 THERAPEUTIC ACTIVITIES: CPT | Performed by: PHYSICAL THERAPIST

## 2021-10-21 NOTE — PROGRESS NOTES
Physical Therapy Daily Progress Note    Patient: Floyd Herrera   : 1945  Diagnosis/ICD-10 Code:  Benign paroxysmal positional vertigo, unspecified laterality [H81.10]  Referring practitioner: Heriberto  Date of Initial Visit: Type: THERAPY  Noted: 2021  Today's Date: 10/21/2021  Patient seen for 2 sessions             Subjective Patient reports he is doing better.  He went on hiking trip and lost HEP so he did some exercises but not all.  Noticed difficulty looking up at trees for extended periods of time.    Objective   See Exercise, Manual, and Modality Logs for complete treatment. Reviewed HEP with VCs for head position.  SLS up to 30 sec, limited by cramping R ham.        Assessment/Plan  Patient independent with HEP in 2 weeks - NOT MET  Able to decrease dizziness 50% in 2 weeks - NOT MET  Able to maintain balance on foam EC NBOS in 2 weeks - NOT  MET  Improve function as evidenced by DHI score of 20% or less by discharge (46% impairment initially) - NOT MET  Able to return to work by discharge - NOT MET    Progress per Plan of Care up to 8 visits if needed           Timed:         Manual Therapy:         mins  35083;     Therapeutic Exercise:    10     mins  36522;     Neuromuscular Barrera:        mins  86442;    Therapeutic Activity:     15     mins  66558;     Gait Training:           mins  93076;     Ultrasound:          mins  77280;    Ionto                                   mins   50697  Self Care                            mins   33478      Un-Timed:  Electrical Stimulation:         mins  99873 ( );  Dry Needling          mins self-pay  Traction          mins 34234  Low Eval          Mins  11323  Mod Eval          Mins  43070  High Eval                            Mins  33042  Canalith Repos                   mins  06562    Timed Treatment:   25   mins   Total Treatment:     25   mins    Robin A Sprigler, PT  Physical Therapist

## 2021-10-26 ENCOUNTER — LAB (OUTPATIENT)
Dept: LAB | Facility: HOSPITAL | Age: 76
End: 2021-10-26

## 2021-10-26 DIAGNOSIS — E10.65 TYPE 1 DIABETES MELLITUS WITH HYPERGLYCEMIA (HCC): ICD-10-CM

## 2021-10-26 LAB
ALBUMIN SERPL-MCNC: 4.4 G/DL (ref 3.5–5.2)
ALBUMIN UR-MCNC: 4.7 MG/DL
ALBUMIN/GLOB SERPL: 1.9 G/DL
ALP SERPL-CCNC: 82 U/L (ref 39–117)
ALT SERPL W P-5'-P-CCNC: 27 U/L (ref 1–41)
ANION GAP SERPL CALCULATED.3IONS-SCNC: 10.4 MMOL/L (ref 5–15)
AST SERPL-CCNC: 25 U/L (ref 1–40)
BILIRUB SERPL-MCNC: 0.7 MG/DL (ref 0–1.2)
BUN SERPL-MCNC: 16 MG/DL (ref 8–23)
BUN/CREAT SERPL: 13 (ref 7–25)
CALCIUM SPEC-SCNC: 9.4 MG/DL (ref 8.6–10.5)
CHLORIDE SERPL-SCNC: 104 MMOL/L (ref 98–107)
CHOLEST SERPL-MCNC: 129 MG/DL (ref 0–200)
CO2 SERPL-SCNC: 26.6 MMOL/L (ref 22–29)
CREAT SERPL-MCNC: 1.23 MG/DL (ref 0.76–1.27)
CREAT UR-MCNC: 238.2 MG/DL
GFR SERPL CREATININE-BSD FRML MDRD: 58 ML/MIN/1.73
GLOBULIN UR ELPH-MCNC: 2.3 GM/DL
GLUCOSE SERPL-MCNC: 120 MG/DL (ref 65–99)
HBA1C MFR BLD: 6.4 % (ref 3.5–5.6)
HDLC SERPL-MCNC: 42 MG/DL (ref 40–60)
LDLC SERPL CALC-MCNC: 70 MG/DL (ref 0–100)
LDLC/HDLC SERPL: 1.66 {RATIO}
MICROALBUMIN/CREAT UR: 19.7 MG/G
POTASSIUM SERPL-SCNC: 4.2 MMOL/L (ref 3.5–5.2)
PROT SERPL-MCNC: 6.7 G/DL (ref 6–8.5)
SODIUM SERPL-SCNC: 141 MMOL/L (ref 136–145)
TRIGL SERPL-MCNC: 87 MG/DL (ref 0–150)
VLDLC SERPL-MCNC: 17 MG/DL (ref 5–40)

## 2021-10-26 PROCEDURE — 80061 LIPID PANEL: CPT

## 2021-10-26 PROCEDURE — 83036 HEMOGLOBIN GLYCOSYLATED A1C: CPT

## 2021-10-26 PROCEDURE — 82043 UR ALBUMIN QUANTITATIVE: CPT

## 2021-10-26 PROCEDURE — 80053 COMPREHEN METABOLIC PANEL: CPT

## 2021-10-26 PROCEDURE — 82570 ASSAY OF URINE CREATININE: CPT

## 2021-10-26 PROCEDURE — 36415 COLL VENOUS BLD VENIPUNCTURE: CPT

## 2021-10-28 ENCOUNTER — TREATMENT (OUTPATIENT)
Dept: PHYSICAL THERAPY | Facility: CLINIC | Age: 76
End: 2021-10-28

## 2021-10-28 DIAGNOSIS — H81.10 BENIGN PAROXYSMAL POSITIONAL VERTIGO, UNSPECIFIED LATERALITY: Primary | ICD-10-CM

## 2021-10-28 DIAGNOSIS — R42 DIZZINESS AND GIDDINESS: ICD-10-CM

## 2021-10-28 PROCEDURE — 97530 THERAPEUTIC ACTIVITIES: CPT | Performed by: PHYSICAL THERAPIST

## 2021-10-28 PROCEDURE — 97110 THERAPEUTIC EXERCISES: CPT | Performed by: PHYSICAL THERAPIST

## 2021-10-28 NOTE — PROGRESS NOTES
Physical Therapy Daily Progress Note    Patient: Floyd Herrera   : 1945  Diagnosis/ICD-10 Code:  Benign paroxysmal positional vertigo, unspecified laterality [H81.10]  Referring practitioner: No ref. provider found  Date of Initial Visit: Type: THERAPY  Noted: 2021  Today's Date: 10/28/2021  Patient seen for 3 sessions             Subjective Patient reports he has been practicing exercises at home and was able to get under a sink without any problems.    Objective   See Exercise, Manual, and Modality Logs for complete treatment. Reviewed Rupert Roldan good form noted, encouraged patient to hold positions a little longer.  Able to balance EC on foam with NBOS 10 sec.  Reassess DHI NEXT.      Assessment/Plan  Patient independent with HEP in 2 weeks - MET  Able to decrease dizziness 50% in 2 weeks - MET  Able to maintain balance on foam EC NBOS in 2 weeks - PARTIALLY  MET  Improve function as evidenced by DHI score of 20% or less by discharge (46% impairment initially) - NOT MET  Able to return to work by discharge - NOT MET    Progress per Plan of Care up to 8 visits if needed           Timed:         Manual Therapy:         mins  68249;     Therapeutic Exercise:    10     mins  81587;     Neuromuscular Barrera:        mins  95176;    Therapeutic Activity:     15     mins  09487;     Gait Training:           mins  93442;     Ultrasound:          mins  29847;    Ionto                                   mins   64314  Self Care                            mins   51916      Un-Timed:  Electrical Stimulation:         mins  58320 ( );  Dry Needling          mins self-pay  Traction          mins 91109  Low Eval          Mins  39352  Mod Eval          Mins  23640  High Eval                            Mins  36520  Canalith Repos                   mins  56015    Timed Treatment:   25   mins   Total Treatment:     25   mins    Robin A Sprigler, PT  Physical Therapist

## 2021-11-02 ENCOUNTER — OFFICE VISIT (OUTPATIENT)
Dept: ENDOCRINOLOGY | Facility: CLINIC | Age: 76
End: 2021-11-02

## 2021-11-02 VITALS
BODY MASS INDEX: 29.54 KG/M2 | DIASTOLIC BLOOD PRESSURE: 80 MMHG | OXYGEN SATURATION: 98 % | HEIGHT: 71 IN | TEMPERATURE: 96.1 F | WEIGHT: 211 LBS | SYSTOLIC BLOOD PRESSURE: 135 MMHG | HEART RATE: 74 BPM

## 2021-11-02 DIAGNOSIS — E10.65 TYPE 1 DIABETES MELLITUS WITH HYPERGLYCEMIA (HCC): Primary | ICD-10-CM

## 2021-11-02 DIAGNOSIS — I10 HYPERTENSION, BENIGN: ICD-10-CM

## 2021-11-02 DIAGNOSIS — E78.2 MIXED HYPERLIPIDEMIA: ICD-10-CM

## 2021-11-02 DIAGNOSIS — E03.9 ACQUIRED HYPOTHYROIDISM: ICD-10-CM

## 2021-11-02 PROBLEM — G96.08 SUBDURAL HYGROMA: Status: ACTIVE | Noted: 2021-06-02

## 2021-11-02 PROBLEM — R42 LIGHT HEADEDNESS: Status: ACTIVE | Noted: 2021-06-02

## 2021-11-02 LAB — GLUCOSE BLDC GLUCOMTR-MCNC: 112 MG/DL (ref 70–105)

## 2021-11-02 PROCEDURE — 99214 OFFICE O/P EST MOD 30 MIN: CPT | Performed by: INTERNAL MEDICINE

## 2021-11-02 PROCEDURE — 82962 GLUCOSE BLOOD TEST: CPT | Performed by: INTERNAL MEDICINE

## 2021-11-02 RX ORDER — ERGOCALCIFEROL (VITAMIN D2) 50 MCG
2000 CAPSULE ORAL DAILY
Qty: 100 CAPSULE | Refills: 4 | Status: SHIPPED | OUTPATIENT
Start: 2021-11-02

## 2021-11-02 NOTE — PROGRESS NOTES
Endocrine Progress Note Outpatient     Patient Care Team:  Tammy Rodas MD as PCP - General (Family Medicine)  Rodger Limon MD as Consulting Physician (Cardiology)  Manuel Camacho MD as Consulting Physician (Cardiology)    Chief Complaint: Follow-up type 1 diabetes    HPI: 76-year-old male with history of type 1 diabetes, hypertension, hyperlipidemia abnormal thyroid tests is here for follow-up.  He has high mario 65 antibodies been mid normal C-peptide therefore he is a still on metformin 500 twice a day along with Lantus 5 units subcu daily.  Blood sugars at home are doing very well.  I reviewed his records and are mostly less than 120.  He checks his blood sugars twice a day. He does not have low blood sugars of less than 70.      Hypertension: Well-controlled    Hyperlipidemia: On simvastatin.    Po thyroidism: He is currently on levothyroxine supplementation.  He is complaining of fatigue and tiredness.    Past Medical History:   Diagnosis Date   • Cataract    • Diabetes mellitus type I (HCC)    • Hyperlipidemia    • Hypertension    • Injury of back    • Shortness of breath    • Skin cancer    • Thyroid disease        Social History     Socioeconomic History   • Marital status:    Tobacco Use   • Smoking status: Former Smoker   • Smokeless tobacco: Never Used   Vaping Use   • Vaping Use: Never used   Substance and Sexual Activity   • Alcohol use: Yes     Comment: occ   • Drug use: Never   • Sexual activity: Never       Family History   Problem Relation Age of Onset   • Stroke Mother    • Heart attack Sister    • Cancer Sister         colon   • Cancer Brother         lung   • Alcohol abuse Brother    • No Known Problems Father    • No Known Problems Maternal Aunt    • No Known Problems Maternal Uncle    • No Known Problems Paternal Aunt    • No Known Problems Paternal Uncle    • No Known Problems Maternal Grandmother    • No Known Problems Maternal Grandfather    • No Known Problems  Paternal Grandmother    • No Known Problems Paternal Grandfather    • No Known Problems Other    • Anemia Neg Hx    • Arrhythmia Neg Hx    • Asthma Neg Hx    • Clotting disorder Neg Hx    • Fainting Neg Hx    • Heart disease Neg Hx    • Heart failure Neg Hx    • Hyperlipidemia Neg Hx    • Hypertension Neg Hx        No Known Allergies    ROS:   Constitutional:  Admit fatigue, tiredness.    Eyes:  Denies change in visual acuity   HENT:  Denies nasal congestion or sore throat   Respiratory: denies cough, shortness of breath.   Cardiovascular:  denies chest pain, edema   GI:  Denies abdominal pain, nausea, vomiting.   Musculoskeletal:  Denies back pain or joint pain   Integument:  Denies dry skin and rash   Neurologic:  Denies headache, focal weakness or sensory changes   Endocrine:  Denies polyuria or polydipsia   Psychiatric:  Denies depression or anxiety      Vitals:    11/02/21 1505   BP: 135/80   Pulse: 74   Temp: 96.1 °F (35.6 °C)   SpO2: 98%       Physical Exam:  GEN: NAD, conversant  EYES: EOMI, PERRL, no conjunctival erythema  NECK: no thyromegaly, full ROM   CV: RRR, no murmurs/rubs/gallops, no peripheral edema  LUNG: CTAB, no wheezes/rales/ronchi  SKIN: no rashes, no acanthosis  MSK: no deformities, full ROM of all extremities  NEURO: no tremors, DTR normal  PSYCH: AOX3, appropriate mood, affect normal  FEET: No ulcer or calluses. 10 gm monofilament intact on both feet.     Results Review:     I reviewed the patient's new clinical results.    Lab Results   Component Value Date    HGBA1C 6.4 (H) 10/26/2021    HGBA1C 6.2 (H) 06/04/2021    HGBA1C 6.5 (H) 12/07/2020      Lab Results   Component Value Date    GLUCOSE 120 (H) 10/26/2021    BUN 16 10/26/2021    CREATININE 1.23 10/26/2021    EGFRIFNONA 58 (L) 10/26/2021    BCR 13.0 10/26/2021    K 4.2 10/26/2021    CO2 26.6 10/26/2021    CALCIUM 9.4 10/26/2021    ALBUMIN 4.40 10/26/2021    LABIL2 1.6 05/14/2019    AST 25 10/26/2021    ALT 27 10/26/2021    CHOL 129  10/26/2021    TRIG 87 10/26/2021    LDL 70 10/26/2021    HDL 42 10/26/2021     Lab Results   Component Value Date    TSH 4.240 (H) 06/04/2021    FREET4 1.01 06/04/2021    THYROIDAB <9 06/04/2021         Medication Review: Reviewed.       Current Outpatient Medications:   •  ACCU-CHEK FASTCLIX LANCETS misc, Use to check blood sugar twice daily, Disp: , Rfl:   •  alfuzosin (UROXATRAL) 10 MG 24 hr tablet, TAKE 1 TABLET EVERY DAY, Disp: 90 tablet, Rfl: 1  •  aspirin 81 MG tablet, ASPIRIN 81 MG ORAL TABLET, Disp: , Rfl:   •  atorvastatin (LIPITOR) 40 MG tablet, TAKE 1 TABLET EVERY DAY, Disp: 90 tablet, Rfl: 1  •  cetirizine (zyrTEC) 10 MG tablet, , Disp: , Rfl:   •  diphenoxylate-atropine (Lomotil) 2.5-0.025 MG per tablet, Take 1 tablet by mouth 4 (Four) Times a Day As Needed for Diarrhea., Disp: 60 tablet, Rfl: 0  •  fluticasone (FLONASE) 50 MCG/ACT nasal spray, As Needed., Disp: , Rfl:   •  glucose blood (ACCU-CHEK GUIDE) test strip, Use to check blood sugar twice daily, Disp: , Rfl:   •  Insulin Glargine (LANTUS SOLOSTAR) 100 UNIT/ML injection pen, LANTUS SOLOSTAR 100 UNIT/ML SOPN, Disp: , Rfl:   •  Insulin Pen Needle (B-D ULTRAFINE III SHORT PEN) 31G X 8 MM misc, BD PEN NEEDLE SHORT U/F 31G X 8 MM, Disp: , Rfl:   •  levothyroxine (SYNTHROID, LEVOTHROID) 25 MCG tablet, Take 12.5 mcg by mouth Daily., Disp: , Rfl:   •  meclizine (ANTIVERT) 25 MG tablet, Take 1 tablet by mouth 3 (Three) Times a Day As Needed for Dizziness., Disp: 15 tablet, Rfl: 0  •  metFORMIN (GLUCOPHAGE) 500 MG tablet, Take 500 mg by mouth 2 (Two) Times a Day With Meals., Disp: , Rfl:   •  montelukast (SINGULAIR) 10 MG tablet, TAKE 1 TABLET EVERY DAY, Disp: 90 tablet, Rfl: 3  •  Multiple Vitamins-Minerals (MULTI VITAMIN/MINERALS) tablet, MULTI VITAMIN/MINERALS TABS, Disp: , Rfl:   •  pantoprazole (PROTONIX) 40 MG EC tablet, TAKE 1 TABLET EVERY DAY, Disp: 90 tablet, Rfl: 1  •  vitamin B-12 (CYANOCOBALAMIN) 1000 MCG tablet, Take 1,000 mcg by mouth  "Daily., Disp: , Rfl:       Assessment/Plan   1. Diabetes mellitus type 1: Excellent control, continue current medications.  Always keep glucose source in case of low blood sugar also advised to get regular eye exam and flu vaccine.    2. Hypertension: He is currently not on any blood pressure medications.  Advised to watch blood pressure at home and decrease salt intake    3. Hyperlipidemia: Much better with LDL now at 70 and triglycerides 87  He is currently on atorvastatin.  Will continue that.      4.  Hypothyroidism: On levothyroxine supplementation.  His continues to have fatigue and tiredness, will add vitamin D 2000 units p.o. daily.            Sania Mays MD FACE.        EMR Dragon / transcription disclaimer:     \"Dictated utilizing Dragon dictation\".                 "

## 2021-11-02 NOTE — PATIENT INSTRUCTIONS
Start vitamin D 2000 units p.o. daily  Continue rest of the medication  Always keep glucose source in case of low blood sugar  Annual eye exam and flu vaccine  Labs before follow-up.

## 2021-12-09 ENCOUNTER — DOCUMENTATION (OUTPATIENT)
Dept: PHYSICAL THERAPY | Facility: CLINIC | Age: 76
End: 2021-12-09

## 2021-12-09 DIAGNOSIS — R42 DIZZINESS AND GIDDINESS: ICD-10-CM

## 2021-12-09 DIAGNOSIS — H81.10 BENIGN PAROXYSMAL POSITIONAL VERTIGO, UNSPECIFIED LATERALITY: Primary | ICD-10-CM

## 2021-12-09 NOTE — PROGRESS NOTES
Discharge Summary  Discharge Summary from Physical Therapy Report        Goals: Partially Met    Discharge Plan: Continue with current home exercise program as instructed    Comments Patient failed to return for treatment.  Last seen 10/28/2021  Patient seen for 3 sessions                  Subjective Patient reports he has been practicing exercises at home and was able to get under a sink without any problems.     Objective   See Exercise, Manual, and Modality Logs for complete treatment. Reviewed Rupert Roldan, good form noted, encouraged patient to hold positions a little longer.  Able to balance EC on foam with NBOS 10 sec.  Reassess DHI NEXT.        Assessment/Plan  Patient independent with HEP in 2 weeks - MET  Able to decrease dizziness 50% in 2 weeks - MET  Able to maintain balance on foam EC NBOS in 2 weeks - PARTIALLY  MET  Improve function as evidenced by DHI score of 20% or less by discharge (46% impairment initially) - NOT MET  Able to return to work by discharge - NOT MET      Date of Discharge 12/9/21        Robin A Sprigler, PT  Physical Therapist

## 2022-02-04 RX ORDER — MONTELUKAST SODIUM 10 MG/1
TABLET ORAL
Qty: 90 TABLET | Refills: 2 | Status: SHIPPED | OUTPATIENT
Start: 2022-02-04 | End: 2022-09-12

## 2022-02-08 ENCOUNTER — LAB (OUTPATIENT)
Dept: FAMILY MEDICINE CLINIC | Facility: CLINIC | Age: 77
End: 2022-02-08

## 2022-02-08 ENCOUNTER — OFFICE VISIT (OUTPATIENT)
Dept: FAMILY MEDICINE CLINIC | Facility: CLINIC | Age: 77
End: 2022-02-08

## 2022-02-08 VITALS
OXYGEN SATURATION: 95 % | BODY MASS INDEX: 30.13 KG/M2 | SYSTOLIC BLOOD PRESSURE: 118 MMHG | WEIGHT: 216 LBS | DIASTOLIC BLOOD PRESSURE: 81 MMHG | TEMPERATURE: 98.2 F | HEART RATE: 72 BPM

## 2022-02-08 DIAGNOSIS — E78.2 MIXED HYPERLIPIDEMIA: ICD-10-CM

## 2022-02-08 DIAGNOSIS — Z00.00 MEDICARE ANNUAL WELLNESS VISIT, SUBSEQUENT: Primary | ICD-10-CM

## 2022-02-08 DIAGNOSIS — E10.65 TYPE 1 DIABETES MELLITUS WITH HYPERGLYCEMIA: ICD-10-CM

## 2022-02-08 DIAGNOSIS — Z23 NEED FOR VACCINATION: ICD-10-CM

## 2022-02-08 DIAGNOSIS — Z11.59 NEED FOR HEPATITIS C SCREENING TEST: ICD-10-CM

## 2022-02-08 LAB
ALBUMIN SERPL-MCNC: 4.4 G/DL (ref 3.5–5.2)
ALBUMIN/GLOB SERPL: 1.8 G/DL
ALP SERPL-CCNC: 80 U/L (ref 39–117)
ALT SERPL W P-5'-P-CCNC: 34 U/L (ref 1–41)
ANION GAP SERPL CALCULATED.3IONS-SCNC: 10.8 MMOL/L (ref 5–15)
AST SERPL-CCNC: 26 U/L (ref 1–40)
BILIRUB SERPL-MCNC: 0.5 MG/DL (ref 0–1.2)
BUN SERPL-MCNC: 16 MG/DL (ref 8–23)
BUN/CREAT SERPL: 12.9 (ref 7–25)
CALCIUM SPEC-SCNC: 9.6 MG/DL (ref 8.6–10.5)
CHLORIDE SERPL-SCNC: 106 MMOL/L (ref 98–107)
CHOLEST SERPL-MCNC: 138 MG/DL (ref 0–200)
CO2 SERPL-SCNC: 26.2 MMOL/L (ref 22–29)
CREAT SERPL-MCNC: 1.24 MG/DL (ref 0.76–1.27)
GFR SERPL CREATININE-BSD FRML MDRD: 57 ML/MIN/1.73
GLOBULIN UR ELPH-MCNC: 2.5 GM/DL
GLUCOSE SERPL-MCNC: 138 MG/DL (ref 65–99)
HBA1C MFR BLD: 6.9 % (ref 3.5–5.6)
HCV AB SER DONR QL: NORMAL
HDLC SERPL-MCNC: 49 MG/DL (ref 40–60)
LDLC SERPL CALC-MCNC: 74 MG/DL (ref 0–100)
LDLC/HDLC SERPL: 1.5 {RATIO}
POTASSIUM SERPL-SCNC: 4.6 MMOL/L (ref 3.5–5.2)
PROT SERPL-MCNC: 6.9 G/DL (ref 6–8.5)
SODIUM SERPL-SCNC: 143 MMOL/L (ref 136–145)
TRIGL SERPL-MCNC: 77 MG/DL (ref 0–150)
VLDLC SERPL-MCNC: 15 MG/DL (ref 5–40)

## 2022-02-08 PROCEDURE — 86803 HEPATITIS C AB TEST: CPT | Performed by: FAMILY MEDICINE

## 2022-02-08 PROCEDURE — 80061 LIPID PANEL: CPT | Performed by: FAMILY MEDICINE

## 2022-02-08 PROCEDURE — 1170F FXNL STATUS ASSESSED: CPT | Performed by: FAMILY MEDICINE

## 2022-02-08 PROCEDURE — G0008 ADMIN INFLUENZA VIRUS VAC: HCPCS | Performed by: FAMILY MEDICINE

## 2022-02-08 PROCEDURE — 1159F MED LIST DOCD IN RCRD: CPT | Performed by: FAMILY MEDICINE

## 2022-02-08 PROCEDURE — 80053 COMPREHEN METABOLIC PANEL: CPT | Performed by: FAMILY MEDICINE

## 2022-02-08 PROCEDURE — G0439 PPPS, SUBSEQ VISIT: HCPCS | Performed by: FAMILY MEDICINE

## 2022-02-08 PROCEDURE — 90662 IIV NO PRSV INCREASED AG IM: CPT | Performed by: FAMILY MEDICINE

## 2022-02-08 PROCEDURE — 83036 HEMOGLOBIN GLYCOSYLATED A1C: CPT | Performed by: FAMILY MEDICINE

## 2022-02-08 PROCEDURE — 36415 COLL VENOUS BLD VENIPUNCTURE: CPT | Performed by: FAMILY MEDICINE

## 2022-02-08 PROCEDURE — 96160 PT-FOCUSED HLTH RISK ASSMT: CPT | Performed by: FAMILY MEDICINE

## 2022-02-08 RX ORDER — ACETAMINOPHEN 160 MG
2000 TABLET,DISINTEGRATING ORAL DAILY
COMMUNITY
Start: 2021-11-15

## 2022-02-08 NOTE — PATIENT INSTRUCTIONS
Medicare Wellness  Personal Prevention Plan of Service     Date of Office Visit:  2022  Encounter Provider:  Tammy Rodas MD  Place of Service:  [facility]  Patient Name: Floyd Herrera  :  1945    As part of the Medicare Wellness portion of your visit today, we are providing you with this personalized preventive plan of services (PPPS). This plan is based upon recommendations of the [place] Preventive Services Task Force (USPSTF) and the Advisory Committee on Immunization Practices (ACIP).    This lists the preventive care services that should be considered, and provides dates of when you are due. Items listed as completed are up-to-date and do not require any further intervention.    Health Maintenance   Topic Date Due   • ZOSTER VACCINE (1 of 2) Never done   • HEPATITIS C SCREENING  Never done   • Pneumococcal Vaccine 65+ (1 of 2 - PPSV23) 2021   • INFLUENZA VACCINE  2021   • DIABETIC EYE EXAM  2022   • HEMOGLOBIN A1C  2022   • DIABETIC FOOT EXAM  10/18/2022   • LIPID PANEL  10/26/2022   • URINE MICROALBUMIN  10/26/2022   • ANNUAL WELLNESS VISIT  2023   • TDAP/TD VACCINES (2 - Td or Tdap) 2024   • COLORECTAL CANCER SCREENING  2027   • COVID-19 Vaccine  Completed   • AAA SCREEN (ONE-TIME)  Completed       Orders Placed This Encounter   Procedures   • Hepatitis C Antibody     Standing Status:   Future     Standing Expiration Date:   2023     Order Specific Question:   Release to patient     Answer:   Immediate   • Comprehensive Metabolic Panel     Order Specific Question:   Release to patient     Answer:   Immediate   • Lipid Panel   • Hemoglobin A1c     Order Specific Question:   Release to patient     Answer:   Immediate       Return in about 1 year (around 2023) for Medicare Wellness.

## 2022-02-08 NOTE — PROGRESS NOTES
The ABCs of the Annual Wellness Visit  Subsequent Medicare Wellness Visit    Chief Complaint   Patient presents with   • Medicare Wellness-subsequent     fasting       Subjective    History of Present Illness:  Floyd Herrera is a 76 y.o. male who presents for a Subsequent Medicare Wellness Visit.  He is [facility]. He has been diagnosed with prostate cancer.     The following portions of the patient's history were reviewed and   updated as appropriate: allergies, current medications, past family history, past medical history, past social history, past surgical history and problem list.    Compared to one year ago, the patient feels his physical   health is the same.    Compared to one year ago, the patient feels his mental   health is the same.    Recent Hospitalizations:  He was not admitted to the hospital during the last year.       Current Medical Providers:  Patient Care Team:  Tammy Rodas MD as PCP - General (Family Medicine)  Rodger Limon MD as Consulting Physician (Cardiology)  Manuel Camacho MD as Consulting Physician (Cardiology)    Outpatient Medications Prior to Visit   Medication Sig Dispense Refill   • Cholecalciferol (Vitamin D3) 50 MCG (2000 UT) capsule Take 2,000 Units by mouth Daily.     • ACCU-CHEK FASTCLIX LANCETS misc Use to check blood sugar twice daily     • alfuzosin (UROXATRAL) 10 MG 24 hr tablet TAKE 1 TABLET EVERY DAY 90 tablet 1   • aspirin 81 MG tablet ASPIRIN 81 MG ORAL TABLET     • atorvastatin (LIPITOR) 40 MG tablet TAKE 1 TABLET EVERY DAY 90 tablet 1   • cetirizine (zyrTEC) 10 MG tablet      • diphenoxylate-atropine (Lomotil) 2.5-0.025 MG per tablet Take 1 tablet by mouth 4 (Four) Times a Day As Needed for Diarrhea. 60 tablet 0   • fluticasone (FLONASE) 50 MCG/ACT nasal spray As Needed.     • glucose blood (ACCU-CHEK GUIDE) test strip Use to check blood sugar twice daily     •  Insulin Glargine (LANTUS SOLOSTAR) 100 UNIT/ML injection pen LANTUS SOLOSTAR 100 UNIT/ML SOPN     • Insulin Pen Needle (B-D ULTRAFINE III SHORT PEN) 31G X 8 MM misc BD PEN NEEDLE SHORT U/F 31G X 8 MM     • levothyroxine (SYNTHROID, LEVOTHROID) 25 MCG tablet Take 12.5 mcg by mouth Daily.     • meclizine (ANTIVERT) 25 MG tablet Take 1 tablet by mouth 3 (Three) Times a Day As Needed for Dizziness. 15 tablet 0   • metFORMIN (GLUCOPHAGE) 500 MG tablet Take 500 mg by mouth 2 (Two) Times a Day With Meals.     • montelukast (SINGULAIR) 10 MG tablet TAKE 1 TABLET EVERY DAY 90 tablet 2   • Multiple Vitamins-Minerals (MULTI VITAMIN/MINERALS) tablet MULTI VITAMIN/MINERALS TABS     • pantoprazole (PROTONIX) 40 MG EC tablet TAKE 1 TABLET EVERY DAY 90 tablet 1   • vitamin B-12 (CYANOCOBALAMIN) 1000 MCG tablet Take 1,000 mcg by mouth Daily.     • Vitamin D, Ergocalciferol, 50 MCG (2000 UT) capsule Take 2,000 Units by mouth Daily. 100 capsule 4     No facility-administered medications prior to visit.       No opioid medication identified on active medication list. I have reviewed chart for other potential  high risk medication/s and harmful drug interactions in the elderly.          Aspirin is on active medication list. Aspirin use is indicated based on review of current medical condition/s. Pros and cons of this therapy have been discussed today. Benefits of this medication outweigh potential harm.  Patient has been encouraged to continue taking this medication.  .      Patient Active Problem List   Diagnosis   • Acquired hallux rigidus   • Benign prostatic hyperplasia with urinary obstruction   • Encounter for immunization   • Medicare annual wellness visit, subsequent   • Mixed hyperlipidemia   • Hypertension, benign   • Hypogonadism   • Impaired fasting glucose   • Need for prophylactic vaccination with combined diphtheria-tetanus-pertussis (DTP) vaccine   • Osteoarthritis   • Presence of cardiac pacemaker   • Sick sinus  syndrome (HCC)   • Reactive airway disease   • Sleep apnea   • Type 1 diabetes mellitus with hyperglycemia (HCC)   • Acquired hypothyroidism   • Gastroesophageal reflux disease without esophagitis   • Dizziness   • Subdural hygroma   • Light headedness   • Need for vaccination     Advance Care Planning  Advance Directive is not on file.  ACP discussion was held with the patient during this visit. Patient does not have an advance directive, information provided.          Objective    Vitals:    02/08/22 0928   BP: 118/81   BP Location: Left arm   Patient Position: Sitting   Cuff Size: Small Adult   Pulse: 72   Temp: 98.2 °F (36.8 °C)   TempSrc: Infrared   SpO2: 95%   Weight: 98 kg (216 lb)     BMI Readings from Last 1 Encounters:   02/08/22 30.13 kg/m²   BMI is above normal parameters. Recommendations include: exercise counseling    Does the patient have evidence of cognitive impairment? No    Physical Exam  Vitals and nursing note reviewed.   Constitutional:       General: He is not in acute distress.     Appearance: He is well-developed.   HENT:      Head: Normocephalic.   Eyes:      General: Lids are normal.      Conjunctiva/sclera: Conjunctivae normal.   Neck:      Thyroid: No thyroid mass or thyromegaly.      Trachea: Trachea normal.   Cardiovascular:      Rate and Rhythm: Normal rate and regular rhythm.      Heart sounds: Murmur heard.       Pulmonary:      Effort: Pulmonary effort is normal.      Breath sounds: Normal breath sounds.   Musculoskeletal:      Cervical back: Normal range of motion.   Lymphadenopathy:      Cervical: No cervical adenopathy.   Skin:     General: Skin is warm and dry.   Neurological:      Mental Status: He is alert and oriented to person, place, and time.   Psychiatric:         Attention and Perception: He is attentive.         Mood and Affect: Mood normal.         Speech: Speech normal.         Behavior: Behavior normal.                 HEALTH RISK ASSESSMENT    Smoking  Status:  Social History     Tobacco Use   Smoking Status Former Smoker   • Packs/day: 0.00   • Years: 0.00   • Pack years: 0.00   • Types: Pipe   Smokeless Tobacco Never Used     Alcohol Consumption:  Social History     Substance and Sexual Activity   Alcohol Use Yes   • Alcohol/week: 0.0 standard drinks    Comment: Socially     Fall Risk Screen:    AMOS Fall Risk Assessment was completed, and patient is at LOW risk for falls.Assessment completed on:2/8/2022    Depression Screening:  PHQ-2/PHQ-9 Depression Screening 2/8/2022   Little interest or pleasure in doing things 0   Feeling down, depressed, or hopeless 0   Trouble falling or staying asleep, or sleeping too much -   Feeling tired or having little energy -   Poor appetite or overeating -   Feeling bad about yourself - or that you are a failure or have let yourself or your family down -   Trouble concentrating on things, such as reading the newspaper or watching television -   Moving or speaking so slowly that other people could have noticed. Or the opposite - being so fidgety or restless that you have been moving around a lot more than usual -   Thoughts that you would be better off dead, or of hurting yourself in some way -   Total Score 0   If you checked off any problems, how difficult have these problems made it for you to do your work, take care of things at home, or get along with other people? -       Health Habits and Functional and Cognitive Screening:  Functional & Cognitive Status 2/8/2022   Do you have difficulty preparing food and eating? No   Do you have difficulty bathing yourself, getting dressed or grooming yourself? No   Do you have difficulty using the toilet? No   Do you have difficulty moving around from place to place? No   Do you have trouble with steps or getting out of a bed or a chair? No   Current Diet Well Balanced Diet   Dental Exam Up to date   Eye Exam Up to date   Exercise (times per week) 5 times per week   Current Exercises  Include Walking   Current Exercise Activities Include -   Do you need help using the phone?  No   Are you deaf or do you have serious difficulty hearing?  Yes   Do you need help with transportation? No   Do you need help shopping? No   Do you need help preparing meals?  No   Do you need help with housework?  No   Do you need help with laundry? No   Do you need help taking your medications? No   Do you need help managing money? -   Do you ever drive or ride in a car without wearing a seat belt? No   Have you felt unusual stress, anger or loneliness in the last month? No   Who do you live with? Spouse   If you need help, do you have trouble finding someone available to you? No   Have you been bothered in the last four weeks by sexual problems? -   Do you have difficulty concentrating, remembering or making decisions? No       Age-appropriate Screening Schedule:  Refer to the list below for future screening recommendations based on patient's age, sex and/or medical conditions. Orders for these recommended tests are listed in the plan section. The patient has been provided with a written plan.    Health Maintenance   Topic Date Due   • ZOSTER VACCINE (1 of 2) Never done   • DIABETIC EYE EXAM  01/18/2022   • HEMOGLOBIN A1C  04/26/2022   • DIABETIC FOOT EXAM  10/18/2022   • LIPID PANEL  10/26/2022   • URINE MICROALBUMIN  10/26/2022   • TDAP/TD VACCINES (2 - Td or Tdap) 11/05/2024   • INFLUENZA VACCINE  Completed              Assessment/Plan   CMS Preventative Services Quick Reference  Risk Factors Identified During Encounter  Hearing Problem  The above risks/problems have been discussed with the patient.  Follow up actions/plans if indicated are seen below in the Assessment/Plan Section.  Pertinent information has been shared with the patient in the After Visit Summary.    Diagnoses and all orders for this visit:    1. Medicare annual wellness visit, subsequent (Primary)    2. Need for hepatitis C screening test  -      Hepatitis C Antibody; Future    3. Mixed hyperlipidemia  -     Comprehensive Metabolic Panel  -     Lipid Panel  -     Hemoglobin A1c    4. Type 1 diabetes mellitus with hyperglycemia (HCC)  -     Comprehensive Metabolic Panel  -     Lipid Panel  -     Hemoglobin A1c    5. Need for vaccination  -     Fluzone High-Dose 65+yrs ([id number])        Follow Up:   Return in about 1 year (around 2/8/2023) for Medicare Wellness.     An After Visit Summary and PPPS were made available to the patient.    {Optional Chart Navigation Links Wrapup  Review (Popup)  Advance Care Planning  Labs  CC  Problem List  Visit Diagnosis  Medications  Result Review  Imaging  [facility] Maintenance  Quality  BestPractice  SmartSets  SnapShot  Encounters  Notes  Media  Procedures :23}

## 2022-02-19 PROCEDURE — 93294 REM INTERROG EVL PM/LDLS PM: CPT | Performed by: INTERNAL MEDICINE

## 2022-02-19 PROCEDURE — 93296 REM INTERROG EVL PM/IDS: CPT | Performed by: INTERNAL MEDICINE

## 2022-03-04 RX ORDER — ALFUZOSIN HYDROCHLORIDE 10 MG/1
TABLET, EXTENDED RELEASE ORAL
Qty: 90 TABLET | Refills: 1 | Status: SHIPPED | OUTPATIENT
Start: 2022-03-04 | End: 2022-07-26

## 2022-03-04 RX ORDER — ATORVASTATIN CALCIUM 40 MG/1
TABLET, FILM COATED ORAL
Qty: 90 TABLET | Refills: 1 | Status: SHIPPED | OUTPATIENT
Start: 2022-03-04 | End: 2022-07-26

## 2022-03-04 RX ORDER — PANTOPRAZOLE SODIUM 40 MG/1
TABLET, DELAYED RELEASE ORAL
Qty: 90 TABLET | Refills: 1 | Status: SHIPPED | OUTPATIENT
Start: 2022-03-04 | End: 2022-07-26

## 2022-03-22 ENCOUNTER — CLINICAL SUPPORT NO REQUIREMENTS (OUTPATIENT)
Dept: CARDIOLOGY | Facility: CLINIC | Age: 77
End: 2022-03-22

## 2022-03-22 ENCOUNTER — OFFICE VISIT (OUTPATIENT)
Dept: CARDIOLOGY | Facility: CLINIC | Age: 77
End: 2022-03-22

## 2022-03-22 VITALS
HEART RATE: 64 BPM | OXYGEN SATURATION: 97 % | DIASTOLIC BLOOD PRESSURE: 77 MMHG | BODY MASS INDEX: 30.1 KG/M2 | WEIGHT: 215 LBS | HEIGHT: 71 IN | SYSTOLIC BLOOD PRESSURE: 132 MMHG

## 2022-03-22 DIAGNOSIS — I49.5 SICK SINUS SYNDROME: ICD-10-CM

## 2022-03-22 DIAGNOSIS — Z95.0 PRESENCE OF CARDIAC PACEMAKER: ICD-10-CM

## 2022-03-22 DIAGNOSIS — I10 HYPERTENSION, BENIGN: ICD-10-CM

## 2022-03-22 DIAGNOSIS — I49.5 SICK SINUS SYNDROME: Primary | ICD-10-CM

## 2022-03-22 DIAGNOSIS — I44.2 AV BLOCK, COMPLETE: ICD-10-CM

## 2022-03-22 DIAGNOSIS — Z95.0 PRESENCE OF CARDIAC PACEMAKER: Primary | ICD-10-CM

## 2022-03-22 PROCEDURE — 93280 PM DEVICE PROGR EVAL DUAL: CPT | Performed by: INTERNAL MEDICINE

## 2022-03-22 PROCEDURE — 99213 OFFICE O/P EST LOW 20 MIN: CPT | Performed by: INTERNAL MEDICINE

## 2022-03-22 NOTE — PROGRESS NOTES
Progress note      Name: Floyd Herrera ADMIT: (Not on file)   : 1945  PCP: Tammy Rodas MD    MRN: [id number] LOS: 0 days   AGE/SEX: 76 y.o. male  ROOM: Room/bed info not found     Chief Complaint   Patient presents with   • Hypertension   • Pacemaker Check       Subjective       History of present illness  Floyd Herrera is a 76-year-old male patient who has complete heart block status post dual-chamber pacemaker placement with generator change out on 2021, also history of prostate cancer, diabetes, hypertension, here today for follow-up.  Patient is doing well denies having any chest pain or shortness of breath, no palpitations no lower extremity edema no syncopal episodes.  Overall is feeling well.    Past Medical History:   Diagnosis Date   • AV block, complete (HCC) 3/22/2022   • Benign prostatic hyperplasia     Prostate cancer   • Cataract    • Diabetes mellitus type I (HCC)    • Hyperlipidemia    • Hypertension    • Injury of back    • Prostate cancer (HCC)     Dr. Jones   • Shortness of breath    • Skin cancer    • Sleep apnea [id number]    Cpap 11 retested  no changes   • Thyroid disease      Past Surgical History:   Procedure Laterality Date   • BACK SURGERY     • CARDIAC CATHETERIZATION     • CARDIAC ELECTROPHYSIOLOGY PROCEDURE N/A 2021    Procedure: PPM generator change - dual;  Surgeon: Manuel Camacho MD;  Location: [facility] INVASIVE LOCATION;  Service: Cardiovascular;  Laterality: N/A;   • CARDIOVASCULAR STRESS TEST      Medications induced sress test   • CARPAL TUNNEL RELEASE     • CATARACT EXTRACTION     • COLONOSCOPY  2017   • GALLBLADDER SURGERY     • HERNIA REPAIR     • KNEE SURGERY     • PACEMAKER REPLACEMENT       Family History   Problem Relation Age of Onset   • Stroke Mother    • Heart attack Sister    • Cancer Sister         colon   • Stroke Sister    • Cancer Brother         lung   • Alcohol abuse Brother    • No Known Problems  Father    • No Known Problems Maternal Aunt    • No Known Problems Maternal Uncle    • No Known Problems Paternal Aunt    • No Known Problems Paternal Uncle    • No Known Problems Maternal Grandmother    • No Known Problems Maternal Grandfather    • No Known Problems Paternal Grandmother    • No Known Problems Paternal Grandfather    • No Known Problems Other    • Anemia Neg Hx    • Arrhythmia Neg Hx    • Asthma Neg Hx    • Clotting disorder Neg Hx    • Fainting Neg Hx    • Heart disease Neg Hx    • Heart failure Neg Hx    • Hyperlipidemia Neg Hx    • Hypertension Neg Hx      Social History     Tobacco Use   • Smoking status: Former Smoker     Packs/day: 0.00     Years: 0.00     Pack years: 0.00     Types: Pipe   • Smokeless tobacco: Never Used   Vaping Use   • Vaping Use: Never used   Substance Use Topics   • Alcohol use: Yes     Comment: Socially   • Drug use: Never     (Not in a hospital admission)    Allergies:  Patient has no known allergies.      Physical Exam  VITALS REVIEWED    General:      well developed, in no acute distress.    Head:      normocephalic and atraumatic.    Eyes:      PERRL/EOM intact, conjunctiva and sclera clear with out nystagmus.    Neck:      no masses, thyromegaly,  trachea central with normal respiratory effort and PMI displaced laterally  Lungs:      Clear to auscultation bilaterally  Heart:       Regular rate and rhythm, systolic murmur  Msk:      no deformity or scoliosis noted of thoracic or lumbar spine.    Pulses:      pulses normal in all 4 extremities.    Extremities:       No lower extremity edema  Neurologic:      no focal deficits.   alert oriented x3  Skin:      intact without lesions or rashes.    Psych:      alert and cooperative; normal mood and affect; normal attention span and concentration.      Result Review :               Pertinent cardiac workup    1.  EKG 6/9/2021 sinus rhythm, ventricular pacing    Procedures        Assessment and Plan      Floyd Herrera  is a 76-year-old male patient who has complete heart block and a dual-chamber pacemaker, here today for follow-up.  Patient is doing well denies any anginal symptoms or CHF symptoms.  His pacemaker interrogation shows good function, his 100% a and V paced.  He does have a systolic murmur on physical exam, I would like to get an echocardiogram before his next visit to assess his cardiac valves but also his ejection fraction since he is 100% RV paced.  We will see him after echocardiogram.    Diagnoses and all orders for this visit:    1. Sick sinus syndrome (HCC) (Primary)    2. AV block, complete (HCC)    3. Presence of cardiac pacemaker    4. Hypertension, benign           Return in about 4 months (around 7/22/2022).  Patient was given instructions and counseling regarding his condition or for health maintenance advice. Please see specific information pulled into the AVS if appropriate.

## 2022-05-03 ENCOUNTER — LAB (OUTPATIENT)
Dept: LAB | Facility: HOSPITAL | Age: 77
End: 2022-05-03

## 2022-05-03 DIAGNOSIS — E10.65 TYPE 1 DIABETES MELLITUS WITH HYPERGLYCEMIA: ICD-10-CM

## 2022-05-03 DIAGNOSIS — I10 HYPERTENSION, BENIGN: ICD-10-CM

## 2022-05-03 LAB
ALBUMIN SERPL-MCNC: 4.2 G/DL (ref 3.5–5.2)
ALBUMIN/GLOB SERPL: 1.6 G/DL
ALP SERPL-CCNC: 75 U/L (ref 39–117)
ALT SERPL W P-5'-P-CCNC: 28 U/L (ref 1–41)
ANION GAP SERPL CALCULATED.3IONS-SCNC: 11.8 MMOL/L (ref 5–15)
AST SERPL-CCNC: 35 U/L (ref 1–40)
BILIRUB SERPL-MCNC: 0.6 MG/DL (ref 0–1.2)
BUN SERPL-MCNC: 14 MG/DL (ref 8–23)
BUN/CREAT SERPL: 11 (ref 7–25)
CALCIUM SPEC-SCNC: 9.6 MG/DL (ref 8.6–10.5)
CHLORIDE SERPL-SCNC: 107 MMOL/L (ref 98–107)
CHOLEST SERPL-MCNC: 128 MG/DL (ref 0–200)
CO2 SERPL-SCNC: 23.2 MMOL/L (ref 22–29)
CREAT SERPL-MCNC: 1.27 MG/DL (ref 0.76–1.27)
EGFRCR SERPLBLD CKD-EPI 2021: 59.3 ML/MIN/1.73
GLOBULIN UR ELPH-MCNC: 2.7 GM/DL
GLUCOSE SERPL-MCNC: 113 MG/DL (ref 65–99)
HBA1C MFR BLD: 6.5 % (ref 3.5–5.6)
HDLC SERPL-MCNC: 45 MG/DL (ref 40–60)
LDLC SERPL CALC-MCNC: 69 MG/DL (ref 0–100)
LDLC/HDLC SERPL: 1.54 {RATIO}
POTASSIUM SERPL-SCNC: 4.9 MMOL/L (ref 3.5–5.2)
PROT SERPL-MCNC: 6.9 G/DL (ref 6–8.5)
SODIUM SERPL-SCNC: 142 MMOL/L (ref 136–145)
T4 FREE SERPL-MCNC: 0.9 NG/DL (ref 0.93–1.7)
TRIGL SERPL-MCNC: 68 MG/DL (ref 0–150)
TSH SERPL DL<=0.05 MIU/L-ACNC: 4.16 UIU/ML (ref 0.27–4.2)
VLDLC SERPL-MCNC: 14 MG/DL (ref 5–40)

## 2022-05-03 PROCEDURE — 36415 COLL VENOUS BLD VENIPUNCTURE: CPT

## 2022-05-03 PROCEDURE — 83036 HEMOGLOBIN GLYCOSYLATED A1C: CPT

## 2022-05-03 PROCEDURE — 84439 ASSAY OF FREE THYROXINE: CPT

## 2022-05-03 PROCEDURE — 80061 LIPID PANEL: CPT

## 2022-05-03 PROCEDURE — 84443 ASSAY THYROID STIM HORMONE: CPT

## 2022-05-03 PROCEDURE — 80053 COMPREHEN METABOLIC PANEL: CPT

## 2022-05-10 ENCOUNTER — OFFICE VISIT (OUTPATIENT)
Dept: ENDOCRINOLOGY | Facility: CLINIC | Age: 77
End: 2022-05-10

## 2022-05-10 VITALS
DIASTOLIC BLOOD PRESSURE: 75 MMHG | SYSTOLIC BLOOD PRESSURE: 135 MMHG | OXYGEN SATURATION: 97 % | HEIGHT: 71 IN | WEIGHT: 213 LBS | BODY MASS INDEX: 29.82 KG/M2 | HEART RATE: 84 BPM

## 2022-05-10 DIAGNOSIS — E10.65 TYPE 1 DIABETES MELLITUS WITH HYPERGLYCEMIA: Primary | ICD-10-CM

## 2022-05-10 DIAGNOSIS — I10 HYPERTENSION, BENIGN: ICD-10-CM

## 2022-05-10 DIAGNOSIS — E03.9 ACQUIRED HYPOTHYROIDISM: ICD-10-CM

## 2022-05-10 DIAGNOSIS — E78.2 MIXED HYPERLIPIDEMIA: ICD-10-CM

## 2022-05-10 LAB — GLUCOSE BLDC GLUCOMTR-MCNC: 109 MG/DL (ref 70–105)

## 2022-05-10 PROCEDURE — 99214 OFFICE O/P EST MOD 30 MIN: CPT | Performed by: INTERNAL MEDICINE

## 2022-05-10 PROCEDURE — 82962 GLUCOSE BLOOD TEST: CPT | Performed by: INTERNAL MEDICINE

## 2022-05-10 NOTE — PROGRESS NOTES
Endocrine Progress Note Outpatient     Patient Care Team:  Tammy Rodas MD as PCP - General (Family Medicine)  Rodger Limon MD as Consulting Physician (Cardiology)  Manuel Camacho MD as Consulting Physician (Cardiology)    Chief Complaint: Follow-up type 1 diabetes    HPI: 76-year-old male with history of type 1 diabetes, hypertension, hyperlipidemia abnormal thyroid tests is here for follow-up.  He has high mario 65 antibodies been mid normal C-peptide therefore he is a still on metformin 500 twice a day along with Lantus 5 units subcu daily.  Blood sugars at home are doing very well.  I reviewed his records and are mostly less than 120.  He checks his blood sugars twice a day. He does not have low blood sugars of less than 70.      Hypertension: Well-controlled    Hyperlipidemia: On simvastatin.    Hypothyroidism: He is currently on levothyroxine supplementation.  He is complaining of fatigue and tiredness.  He is currently on levothyroxine 25 mcg p.o. daily.    Past Medical History:   Diagnosis Date   • AV block, complete (HCC) 3/22/2022   • Benign prostatic hyperplasia 2021    Prostate cancer   • Cataract    • Diabetes mellitus type I (HCC)    • Hyperlipidemia    • Hypertension    • Injury of back    • Prostate cancer (HCC) 2021    Dr. Jones   • Shortness of breath    • Skin cancer    • Sleep apnea [id number]    Cpap 11 retested 2009 no changes   • Thyroid disease        Social History     Socioeconomic History   • Marital status:      Spouse name: Kalina   • Number of children: 1   • Years of education: 12   Tobacco Use   • Smoking status: Former Smoker     Packs/day: 0.00     Years: 0.00     Pack years: 0.00     Types: Pipe   • Smokeless tobacco: Never Used   Vaping Use   • Vaping Use: Never used   Substance and Sexual Activity   • Alcohol use: Yes     Comment: Socially   • Drug use: Never   • Sexual activity: Never       Family History   Problem Relation Age of Onset   • Stroke  Mother    • Heart attack Sister    • Cancer Sister         colon   • Stroke Sister    • Cancer Brother         lung   • Alcohol abuse Brother    • No Known Problems Father    • No Known Problems Maternal Aunt    • No Known Problems Maternal Uncle    • No Known Problems Paternal Aunt    • No Known Problems Paternal Uncle    • No Known Problems Maternal Grandmother    • No Known Problems Maternal Grandfather    • No Known Problems Paternal Grandmother    • No Known Problems Paternal Grandfather    • No Known Problems Other    • Anemia Neg Hx    • Arrhythmia Neg Hx    • Asthma Neg Hx    • Clotting disorder Neg Hx    • Fainting Neg Hx    • Heart disease Neg Hx    • Heart failure Neg Hx    • Hyperlipidemia Neg Hx    • Hypertension Neg Hx        No Known Allergies    ROS:   Constitutional:  Admit fatigue, tiredness.    Eyes:  Denies change in visual acuity   HENT:  Denies nasal congestion or sore throat   Respiratory: denies cough, shortness of breath.   Cardiovascular:  denies chest pain, edema   GI:  Denies abdominal pain, nausea, vomiting.   Musculoskeletal:  Denies back pain or joint pain   Integument:  Denies dry skin and rash   Neurologic:  Denies headache, focal weakness or sensory changes   Endocrine:  Denies polyuria or polydipsia   Psychiatric:  Denies depression or anxiety      Vitals:    05/10/22 1441   BP: 135/75   Pulse: 84   SpO2: 97%       Physical Exam:  GEN: NAD, conversant  EYES: EOMI, PERRL, no conjunctival erythema  NECK: no thyromegaly, full ROM   CV: RRR, no murmurs/rubs/gallops, no peripheral edema  LUNG: CTAB, no wheezes/rales/ronchi  SKIN: no rashes, no acanthosis  MSK: no deformities, full ROM of all extremities  NEURO: no tremors, DTR normal  PSYCH: AOX3, appropriate mood, affect normal  FEET: No ulcer or calluses. 10 gm monofilament intact on both feet.     Results Review:     I reviewed the patient's new clinical results.    Lab Results   Component Value Date    HGBA1C 6.5 (H) 05/03/2022     HGBA1C 6.9 (H) 02/08/2022    HGBA1C 6.4 (H) 10/26/2021      Lab Results   Component Value Date    GLUCOSE 113 (H) 05/03/2022    BUN 14 05/03/2022    CREATININE 1.27 05/03/2022    EGFRIFNONA 57 (L) 02/08/2022    BCR 11.0 05/03/2022    K 4.9 05/03/2022    CO2 23.2 05/03/2022    CALCIUM 9.6 05/03/2022    ALBUMIN 4.20 05/03/2022    LABIL2 1.6 05/14/2019    AST 35 05/03/2022    ALT 28 05/03/2022    CHOL 128 05/03/2022    TRIG 68 05/03/2022    LDL 69 05/03/2022    HDL 45 05/03/2022     Lab Results   Component Value Date    TSH 4.160 05/03/2022    FREET4 0.90 (L) 05/03/2022    THYROIDAB <9 06/04/2021         Medication Review: Reviewed.       Current Outpatient Medications:   •  ACCU-CHEK FASTCLIX LANCETS misc, Use to check blood sugar twice daily, Disp: , Rfl:   •  alfuzosin (UROXATRAL) 10 MG 24 hr tablet, TAKE 1 TABLET EVERY DAY, Disp: 90 tablet, Rfl: 1  •  aspirin 81 MG tablet, ASPIRIN 81 MG ORAL TABLET, Disp: , Rfl:   •  atorvastatin (LIPITOR) 40 MG tablet, TAKE 1 TABLET EVERY DAY, Disp: 90 tablet, Rfl: 1  •  cetirizine (zyrTEC) 10 MG tablet, , Disp: , Rfl:   •  Cholecalciferol (Vitamin D3) 50 MCG (2000 UT) capsule, Take 2,000 Units by mouth Daily., Disp: , Rfl:   •  diphenoxylate-atropine (Lomotil) 2.5-0.025 MG per tablet, Take 1 tablet by mouth 4 (Four) Times a Day As Needed for Diarrhea., Disp: 60 tablet, Rfl: 0  •  fluticasone (FLONASE) 50 MCG/ACT nasal spray, As Needed., Disp: , Rfl:   •  glucose blood test strip, Use to check blood sugar twice daily, Disp: , Rfl:   •  Insulin Glargine (LANTUS SOLOSTAR) 100 UNIT/ML injection pen, LANTUS SOLOSTAR 100 UNIT/ML SOPN, Disp: , Rfl:   •  Insulin Pen Needle 31G X 8 MM misc, BD PEN NEEDLE SHORT U/F 31G X 8 MM, Disp: , Rfl:   •  levothyroxine (SYNTHROID, LEVOTHROID) 25 MCG tablet, Take 25 mcg by mouth Daily., Disp: , Rfl:   •  meclizine (ANTIVERT) 25 MG tablet, Take 1 tablet by mouth 3 (Three) Times a Day As Needed for Dizziness., Disp: 15 tablet, Rfl: 0  •  metFORMIN  "(GLUCOPHAGE) 500 MG tablet, Take 500 mg by mouth 2 (Two) Times a Day With Meals., Disp: , Rfl:   •  montelukast (SINGULAIR) 10 MG tablet, TAKE 1 TABLET EVERY DAY, Disp: 90 tablet, Rfl: 2  •  Multiple Vitamins-Minerals (MULTI VITAMIN/MINERALS) tablet, MULTI VITAMIN/MINERALS TABS, Disp: , Rfl:   •  pantoprazole (PROTONIX) 40 MG EC tablet, TAKE 1 TABLET EVERY DAY, Disp: 90 tablet, Rfl: 1  •  vitamin B-12 (CYANOCOBALAMIN) 1000 MCG tablet, Take 1,000 mcg by mouth Daily., Disp: , Rfl:   •  Vitamin D, Ergocalciferol, 50 MCG (2000 UT) capsule, Take 2,000 Units by mouth Daily., Disp: 100 capsule, Rfl: 4      Assessment/Plan   1. Diabetes mellitus type 1: Excellent control, A1c 6.5%.  Continue current medications.  Always keep glucose source in case of low blood sugar also advised to get regular eye exam and flu vaccine.    2. Hypertension: He is currently not on any blood pressure medications.  Advised to watch blood pressure at home and decrease salt intake    3. Hyperlipidemia: Continue atorvastatin and follow lipid panel.    4.  Hypothyroidism: Uncontrolled with mild high TSH and low free T4, he has been taking his levothyroxine with other medications, I have advised him to change and take levothyroxine by itself with water at least 1 hour before or after other pills and food and will follow labs.            Sania Mays MD FACE.        EMR Dragon / transcription disclaimer:     \"Dictated utilizing Dragon dictation\".                 "

## 2022-05-10 NOTE — PATIENT INSTRUCTIONS
Please take your levothyroxine by itself with water at least 1 hour before or after other pills and food  Continue rest of the medications  Always keep glucose source in case of low blood sugar  Annual eye exam and flu vaccine  Labs before follow-up.

## 2022-05-21 PROCEDURE — 93296 REM INTERROG EVL PM/IDS: CPT | Performed by: INTERNAL MEDICINE

## 2022-05-21 PROCEDURE — 93294 REM INTERROG EVL PM/LDLS PM: CPT | Performed by: INTERNAL MEDICINE

## 2022-07-22 ENCOUNTER — OFFICE VISIT (OUTPATIENT)
Dept: CARDIOLOGY | Facility: CLINIC | Age: 77
End: 2022-07-22

## 2022-07-22 ENCOUNTER — HOSPITAL ENCOUNTER (OUTPATIENT)
Dept: CARDIOLOGY | Facility: HOSPITAL | Age: 77
Discharge: HOME | End: 2022-07-22
Admitting: INTERNAL MEDICINE

## 2022-07-22 VITALS
BODY MASS INDEX: 29.82 KG/M2 | WEIGHT: 213 LBS | SYSTOLIC BLOOD PRESSURE: 136 MMHG | HEIGHT: 71 IN | HEART RATE: 82 BPM | DIASTOLIC BLOOD PRESSURE: 76 MMHG

## 2022-07-22 VITALS
DIASTOLIC BLOOD PRESSURE: 84 MMHG | SYSTOLIC BLOOD PRESSURE: 141 MMHG | HEART RATE: 76 BPM | WEIGHT: 211 LBS | HEIGHT: 71 IN | BODY MASS INDEX: 29.54 KG/M2 | OXYGEN SATURATION: 98 %

## 2022-07-22 DIAGNOSIS — I49.5 SICK SINUS SYNDROME: ICD-10-CM

## 2022-07-22 DIAGNOSIS — E78.2 MIXED HYPERLIPIDEMIA: ICD-10-CM

## 2022-07-22 DIAGNOSIS — I44.2 AV BLOCK, COMPLETE: ICD-10-CM

## 2022-07-22 DIAGNOSIS — Z95.0 PRESENCE OF CARDIAC PACEMAKER: Primary | ICD-10-CM

## 2022-07-22 PROCEDURE — 99213 OFFICE O/P EST LOW 20 MIN: CPT | Performed by: INTERNAL MEDICINE

## 2022-07-22 PROCEDURE — 93000 ELECTROCARDIOGRAM COMPLETE: CPT | Performed by: INTERNAL MEDICINE

## 2022-07-22 PROCEDURE — 93306 TTE W/DOPPLER COMPLETE: CPT | Performed by: INTERNAL MEDICINE

## 2022-07-22 PROCEDURE — 93306 TTE W/DOPPLER COMPLETE: CPT

## 2022-07-22 NOTE — PROGRESS NOTES
Progress note      Name: Floyd Herrera ADMIT: (Not on file)   : 1945  PCP: Tammy Rodas MD    MRN: [id number] LOS: 0 days   AGE/SEX: 76 y.o. male  ROOM: Room/bed info not found     Chief Complaint   Patient presents with   • Follow-up     Echo results       Subjective       History of present illness  Floyd Herrera is a 76-year-old male patient was complete heart block status post dual-chamber pacemaker placement with generator change out on 2021, also history of prostate cancer, diabetes, hypertension here today for follow-up.  During his last visit we had ordered an echo to assess his ejection fraction since he has been RV paced chronically.    Past Medical History:   Diagnosis Date   • AV block, complete (HCC) 3/22/2022   • Benign prostatic hyperplasia     Prostate cancer   • Cataract    • Diabetes mellitus type I (HCC)    • Hyperlipidemia    • Hypertension    • Injury of back    • Prostate cancer (HCC)     Dr. Jones   • Shortness of breath    • Skin cancer    • Sleep apnea [id number]    Cpap 11 retested  no changes   • Thyroid disease      Past Surgical History:   Procedure Laterality Date   • BACK SURGERY     • CARDIAC CATHETERIZATION     • CARDIAC ELECTROPHYSIOLOGY PROCEDURE N/A 2021    Procedure: PPM generator change - dual;  Surgeon: Manuel Camacho MD;  Location: [facility] CATH INVASIVE LOC[facility];  Service: Cardiovascular;  Laterality: N/A;   • CARDIOVASCULAR STRESS TEST      Medications induced sress test   • CARPAL TUNNEL RELEASE     • CATARACT EXTRACTION     • COLONOSCOPY  2017   • GALLBLADDER SURGERY     • HERNIA REPAIR     • KNEE SURGERY     • PACEMAKER REPLACEMENT       Family History   Problem Relation Age of Onset   • Stroke Mother    • Heart attack Sister    • Cancer Sister         colon   • Stroke Sister    • Cancer Brother         lung   • Alcohol abuse Brother    • No Known Problems Father    • No Known Problems Maternal Aunt    • No Known  Problems Maternal Uncle    • No Known Problems Paternal Aunt    • No Known Problems Paternal Uncle    • No Known Problems Maternal Grandmother    • No Known Problems Maternal Grandfather    • No Known Problems Paternal Grandmother    • No Known Problems Paternal Grandfather    • No Known Problems Other    • Anemia Neg Hx    • Arrhythmia Neg Hx    • Asthma Neg Hx    • Clotting disorder Neg Hx    • Fainting Neg Hx    • Heart disease Neg Hx    • Heart failure Neg Hx    • Hyperlipidemia Neg Hx    • Hypertension Neg Hx      Social History     Tobacco Use   • Smoking status: Former Smoker     Packs/day: 0.00     Years: 0.00     Pack years: 0.00     Types: Pipe   • Smokeless tobacco: Never Used   Vaping Use   • Vaping Use: Never used   Substance Use Topics   • Alcohol use: Yes     Comment: Socially   • Drug use: Never     (Not in a hospital admission)    Allergies:  Patient has no known allergies.      Physical Exam  VITALS REVIEWED    General:      well developed, in no acute distress.    Head:      normocephalic and atraumatic.    Eyes:      PERRL/EOM intact, conjunctiva and sclera clear with out nystagmus.    Neck:      no masses, thyromegaly,  trachea central with normal respiratory effort and PMI displaced laterally  Lungs:      Clear to auscultation bilaterally  Heart:       regular rate and rhythm  Msk:      no deformity or scoliosis noted of thoracic or lumbar spine.    Pulses:      pulses normal in all 4 extremities.    Extremities:       no lower extremity edema  Neurologic:      no focal deficits.   alert oriented x3  Skin:      intact without lesions or rashes.    Psych:      alert and cooperative; normal mood and affect; normal attention span and concentration.      Result Review :               Pertinent cardiac workup    1.  EKG 6/9/2021 sinus rhythm, ventricular pacing  2.  Echo 7/22/2022 ejection fraction 60%.        ECG 12 Lead    Date/Time: 7/22/2022 12:00 PM  Performed by: Julian Quezada,  MD  Authorized by: Julian Quezada MD   Comparison: compared with previous ECG   Rhythm comments: A-v paced  Rate: normal  BPM: 63  QRS axis: normal  Other findings: non-specific ST-T wave changes    Clinical impression: abnormal EKG                Assessment and Plan      Floyd Herrera is a 76-year-old male patient was complete heart block and a dual-chamber pacemaker here today for follow-up.  Patient is doing well denies having any anginal symptoms or CHF symptoms.  His pacemaker interrogation had shown 100% RV pacing.  Echocardiogram during this visit showed normal cardiac function and no RV pacing induced cardiomyopathy.  For now we will continue same therapy and I will see him in follow-up in 1 year.    Diagnoses and all orders for this visit:    1. Presence of cardiac pacemaker (Primary)    2. Sick sinus syndrome (HCC)    3. AV block, complete (HCC)    4. Mixed hyperlipidemia    Other orders  -     ECG 12 Lead           Return in about 1 year (around 7/22/2023).  Patient was given instructions and counseling regarding his condition or for health maintenance advice. Please see specific information pulled into the AVS if appropriate.

## 2022-07-24 LAB
BH CV ECHO MEAS - ACS: 2.12 CM
BH CV ECHO MEAS - AO MAX PG: 29.4 MMHG
BH CV ECHO MEAS - AO MEAN PG: 17.1 MMHG
BH CV ECHO MEAS - AO ROOT DIAM: 3.3 CM
BH CV ECHO MEAS - AO V2 MAX: 271.1 CM/SEC
BH CV ECHO MEAS - AO V2 VTI: 60.5 CM
BH CV ECHO MEAS - AVA(I,D): 1.62 CM2
BH CV ECHO MEAS - EDV(CUBED): 181.1 ML
BH CV ECHO MEAS - EDV(MOD-SP2): 123.6 ML
BH CV ECHO MEAS - EDV(MOD-SP4): 114 ML
BH CV ECHO MEAS - EF(MOD-BP): 58 %
BH CV ECHO MEAS - EF(MOD-SP2): 58.4 %
BH CV ECHO MEAS - EF(MOD-SP4): 57.4 %
BH CV ECHO MEAS - ESV(CUBED): 35.1 ML
BH CV ECHO MEAS - ESV(MOD-SP2): 51.4 ML
BH CV ECHO MEAS - ESV(MOD-SP4): 48.5 ML
BH CV ECHO MEAS - FS: 42.1 %
BH CV ECHO MEAS - IVS/LVPW: 1.29 CM
BH CV ECHO MEAS - IVSD: 0.93 CM
BH CV ECHO MEAS - LA DIMENSION: 4.3 CM
BH CV ECHO MEAS - LV DIASTOLIC VOL/BSA (35-75): 52.6 CM2
BH CV ECHO MEAS - LV MASS(C)D: 173.7 GRAMS
BH CV ECHO MEAS - LV MAX PG: 6.2 MMHG
BH CV ECHO MEAS - LV MEAN PG: 3.2 MMHG
BH CV ECHO MEAS - LV SYSTOLIC VOL/BSA (12-30): 22.4 CM2
BH CV ECHO MEAS - LV V1 MAX: 124.1 CM/SEC
BH CV ECHO MEAS - LV V1 VTI: 26.1 CM
BH CV ECHO MEAS - LVIDD: 5.7 CM
BH CV ECHO MEAS - LVIDS: 3.3 CM
BH CV ECHO MEAS - LVOT AREA: 3.7 CM2
BH CV ECHO MEAS - LVOT DIAM: 2.18 CM
BH CV ECHO MEAS - LVPWD: 0.72 CM
BH CV ECHO MEAS - MV A MAX VEL: 69.6 CM/SEC
BH CV ECHO MEAS - MV DEC SLOPE: 275.9 CM/SEC2
BH CV ECHO MEAS - MV DEC TIME: 0.29 MSEC
BH CV ECHO MEAS - MV E MAX VEL: 80.4 CM/SEC
BH CV ECHO MEAS - MV E/A: 1.15
BH CV ECHO MEAS - MV MAX PG: 2.8 MMHG
BH CV ECHO MEAS - MV MEAN PG: 1.15 MMHG
BH CV ECHO MEAS - MV V2 VTI: 28.2 CM
BH CV ECHO MEAS - MVA(VTI): 3.5 CM2
BH CV ECHO MEAS - PA ACC TIME: 0.11 SEC
BH CV ECHO MEAS - PA PR(ACCEL): 29.2 MMHG
BH CV ECHO MEAS - PA V2 MAX: 111.6 CM/SEC
BH CV ECHO MEAS - PI END-D VEL: 63.1 CM/SEC
BH CV ECHO MEAS - PULM A REVS DUR: 0.1 SEC
BH CV ECHO MEAS - PULM A REVS VEL: 26.1 CM/SEC
BH CV ECHO MEAS - PULM DIAS VEL: 67.2 CM/SEC
BH CV ECHO MEAS - PULM S/D: 1.04
BH CV ECHO MEAS - PULM SYS VEL: 69.9 CM/SEC
BH CV ECHO MEAS - RAP SYSTOLE: 3 MMHG
BH CV ECHO MEAS - RV MAX PG: 2.6 MMHG
BH CV ECHO MEAS - RV V1 MAX: 81.3 CM/SEC
BH CV ECHO MEAS - RV V1 VTI: 17 CM
BH CV ECHO MEAS - RVSP: 27.1 MMHG
BH CV ECHO MEAS - SI(MOD-SP2): 33.4 ML/M2
BH CV ECHO MEAS - SI(MOD-SP4): 30.2 ML/M2
BH CV ECHO MEAS - SV(LVOT): 97.7 ML
BH CV ECHO MEAS - SV(MOD-SP2): 72.2 ML
BH CV ECHO MEAS - SV(MOD-SP4): 65.5 ML
BH CV ECHO MEAS - TR MAX PG: 24.1 MMHG
BH CV ECHO MEAS - TR MAX VEL: 245.7 CM/SEC
LEFT ATRIUM VOLUME INDEX: 31 ML/M2
MAXIMAL PREDICTED HEART RATE: 146 BPM
STRESS TARGET HR: 124 BPM

## 2022-07-26 RX ORDER — PANTOPRAZOLE SODIUM 40 MG/1
TABLET, DELAYED RELEASE ORAL
Qty: 90 TABLET | Refills: 1 | Status: SHIPPED | OUTPATIENT
Start: 2022-07-26 | End: 2023-03-20

## 2022-07-26 RX ORDER — ALFUZOSIN HYDROCHLORIDE 10 MG/1
TABLET, EXTENDED RELEASE ORAL
Qty: 90 TABLET | Refills: 1 | Status: SHIPPED | OUTPATIENT
Start: 2022-07-26 | End: 2023-04-05

## 2022-07-26 RX ORDER — ATORVASTATIN CALCIUM 40 MG/1
TABLET, FILM COATED ORAL
Qty: 90 TABLET | Refills: 1 | Status: SHIPPED | OUTPATIENT
Start: 2022-07-26 | End: 2023-04-05

## 2022-07-28 ENCOUNTER — OFFICE (AMBULATORY)
Dept: URBAN - METROPOLITAN AREA CLINIC 64 | Facility: CLINIC | Age: 77
End: 2022-07-28

## 2022-07-28 VITALS
HEIGHT: 72 IN | SYSTOLIC BLOOD PRESSURE: 132 MMHG | WEIGHT: 236 LBS | HEART RATE: 84 BPM | DIASTOLIC BLOOD PRESSURE: 81 MMHG

## 2022-07-28 DIAGNOSIS — R10.32 LEFT LOWER QUADRANT PAIN: ICD-10-CM

## 2022-07-28 DIAGNOSIS — Z95.0 PRESENCE OF CARDIAC PACEMAKER: ICD-10-CM

## 2022-07-28 PROCEDURE — 99203 OFFICE O/P NEW LOW 30 MIN: CPT | Performed by: NURSE PRACTITIONER

## 2022-08-20 PROCEDURE — 93296 REM INTERROG EVL PM/IDS: CPT | Performed by: INTERNAL MEDICINE

## 2022-08-20 PROCEDURE — 93294 REM INTERROG EVL PM/LDLS PM: CPT | Performed by: INTERNAL MEDICINE

## 2022-08-25 ENCOUNTER — OFFICE (AMBULATORY)
Dept: URBAN - METROPOLITAN AREA PATHOLOGY 4 | Facility: PATHOLOGY | Age: 77
End: 2022-08-25

## 2022-08-25 ENCOUNTER — ON CAMPUS - OUTPATIENT (AMBULATORY)
Dept: URBAN - METROPOLITAN AREA HOSPITAL 2 | Facility: HOSPITAL | Age: 77
End: 2022-08-25

## 2022-08-25 ENCOUNTER — OFFICE (AMBULATORY)
Dept: URBAN - METROPOLITAN AREA PATHOLOGY 4 | Facility: PATHOLOGY | Age: 77
End: 2022-08-25
Payer: COMMERCIAL

## 2022-08-25 VITALS
HEIGHT: 72 IN | OXYGEN SATURATION: 96 % | SYSTOLIC BLOOD PRESSURE: 115 MMHG | SYSTOLIC BLOOD PRESSURE: 133 MMHG | HEART RATE: 60 BPM | DIASTOLIC BLOOD PRESSURE: 78 MMHG | OXYGEN SATURATION: 100 % | SYSTOLIC BLOOD PRESSURE: 131 MMHG | SYSTOLIC BLOOD PRESSURE: 113 MMHG | SYSTOLIC BLOOD PRESSURE: 124 MMHG | RESPIRATION RATE: 18 BRPM | DIASTOLIC BLOOD PRESSURE: 80 MMHG | RESPIRATION RATE: 17 BRPM | SYSTOLIC BLOOD PRESSURE: 105 MMHG | DIASTOLIC BLOOD PRESSURE: 65 MMHG | HEART RATE: 73 BPM | HEART RATE: 79 BPM | TEMPERATURE: 97.1 F | DIASTOLIC BLOOD PRESSURE: 76 MMHG | WEIGHT: 210 LBS | SYSTOLIC BLOOD PRESSURE: 134 MMHG | OXYGEN SATURATION: 99 % | SYSTOLIC BLOOD PRESSURE: 97 MMHG | OXYGEN SATURATION: 97 % | OXYGEN SATURATION: 98 % | DIASTOLIC BLOOD PRESSURE: 60 MMHG | DIASTOLIC BLOOD PRESSURE: 101 MMHG | RESPIRATION RATE: 16 BRPM | HEART RATE: 63 BPM

## 2022-08-25 DIAGNOSIS — D12.2 BENIGN NEOPLASM OF ASCENDING COLON: ICD-10-CM

## 2022-08-25 DIAGNOSIS — K64.1 SECOND DEGREE HEMORRHOIDS: ICD-10-CM

## 2022-08-25 DIAGNOSIS — K57.30 DIVERTICULOSIS OF LARGE INTESTINE WITHOUT PERFORATION OR ABS: ICD-10-CM

## 2022-08-25 DIAGNOSIS — Z86.010 PERSONAL HISTORY OF COLONIC POLYPS: ICD-10-CM

## 2022-08-25 PROBLEM — K63.5 POLYP OF COLON: Status: ACTIVE | Noted: 2022-08-25

## 2022-08-25 LAB
GI HISTOLOGY: A. UNSPECIFIED: (no result)
GI HISTOLOGY: PDF REPORT: (no result)

## 2022-08-25 PROCEDURE — 45385 COLONOSCOPY W/LESION REMOVAL: CPT | Mod: PT | Performed by: INTERNAL MEDICINE

## 2022-08-25 PROCEDURE — 88305 TISSUE EXAM BY PATHOLOGIST: CPT | Mod: 26 | Performed by: INTERNAL MEDICINE

## 2022-09-12 RX ORDER — MONTELUKAST SODIUM 10 MG/1
TABLET ORAL
Qty: 90 TABLET | Refills: 2 | Status: SHIPPED | OUTPATIENT
Start: 2022-09-12

## 2022-11-19 PROCEDURE — 93294 REM INTERROG EVL PM/LDLS PM: CPT | Performed by: INTERNAL MEDICINE

## 2022-11-19 PROCEDURE — 93296 REM INTERROG EVL PM/IDS: CPT | Performed by: INTERNAL MEDICINE

## 2022-12-07 PROBLEM — Z96.1 PSEUDOPHAKIA OF RIGHT EYE: Status: ACTIVE | Noted: 2017-10-05

## 2022-12-07 PROBLEM — H52.4 PRESBYOPIA: Status: ACTIVE | Noted: 2017-01-23

## 2022-12-07 PROBLEM — H25.819 COMBINED FORM OF SENILE CATARACT: Status: ACTIVE | Noted: 2017-01-23

## 2022-12-07 PROBLEM — H43.819 POSTERIOR VITREOUS DETACHMENT: Status: ACTIVE | Noted: 2019-02-06

## 2022-12-08 ENCOUNTER — LAB (OUTPATIENT)
Dept: LAB | Facility: HOSPITAL | Age: 77
End: 2022-12-08

## 2022-12-08 DIAGNOSIS — E10.65 TYPE 1 DIABETES MELLITUS WITH HYPERGLYCEMIA: ICD-10-CM

## 2022-12-08 LAB
ALBUMIN SERPL-MCNC: 4.4 G/DL (ref 3.5–5.2)
ALBUMIN UR-MCNC: 2 MG/DL
ALBUMIN/GLOB SERPL: 1.7 G/DL
ALP SERPL-CCNC: 72 U/L (ref 39–117)
ALT SERPL W P-5'-P-CCNC: 21 U/L (ref 1–41)
ANION GAP SERPL CALCULATED.3IONS-SCNC: 10 MMOL/L (ref 5–15)
AST SERPL-CCNC: 23 U/L (ref 1–40)
BILIRUB SERPL-MCNC: 0.6 MG/DL (ref 0–1.2)
BUN SERPL-MCNC: 18 MG/DL (ref 8–23)
BUN/CREAT SERPL: 15.5 (ref 7–25)
CALCIUM SPEC-SCNC: 9.7 MG/DL (ref 8.6–10.5)
CHLORIDE SERPL-SCNC: 99 MMOL/L (ref 98–107)
CHOLEST SERPL-MCNC: 124 MG/DL (ref 0–200)
CO2 SERPL-SCNC: 27 MMOL/L (ref 22–29)
CREAT SERPL-MCNC: 1.16 MG/DL (ref 0.76–1.27)
CREAT UR-MCNC: 147.8 MG/DL
EGFRCR SERPLBLD CKD-EPI 2021: 65.7 ML/MIN/1.73
GLOBULIN UR ELPH-MCNC: 2.6 GM/DL
GLUCOSE SERPL-MCNC: 96 MG/DL (ref 65–99)
HBA1C MFR BLD: 6.2 % (ref 3.5–5.6)
HDLC SERPL-MCNC: 42 MG/DL (ref 40–60)
LDLC SERPL CALC-MCNC: 69 MG/DL (ref 0–100)
LDLC/HDLC SERPL: 1.67 {RATIO}
MICROALBUMIN/CREAT UR: 13.5 MG/G
POTASSIUM SERPL-SCNC: 4.5 MMOL/L (ref 3.5–5.2)
PROT SERPL-MCNC: 7 G/DL (ref 6–8.5)
SODIUM SERPL-SCNC: 136 MMOL/L (ref 136–145)
T4 FREE SERPL-MCNC: 1.08 NG/DL (ref 0.93–1.7)
TRIGL SERPL-MCNC: 59 MG/DL (ref 0–150)
TSH SERPL DL<=0.05 MIU/L-ACNC: 4.69 UIU/ML (ref 0.27–4.2)
VLDLC SERPL-MCNC: 13 MG/DL (ref 5–40)

## 2022-12-08 PROCEDURE — 83036 HEMOGLOBIN GLYCOSYLATED A1C: CPT

## 2022-12-08 PROCEDURE — 84439 ASSAY OF FREE THYROXINE: CPT

## 2022-12-08 PROCEDURE — 84443 ASSAY THYROID STIM HORMONE: CPT

## 2022-12-08 PROCEDURE — 82570 ASSAY OF URINE CREATININE: CPT

## 2022-12-08 PROCEDURE — 80053 COMPREHEN METABOLIC PANEL: CPT

## 2022-12-08 PROCEDURE — 36415 COLL VENOUS BLD VENIPUNCTURE: CPT

## 2022-12-08 PROCEDURE — 80061 LIPID PANEL: CPT

## 2022-12-08 PROCEDURE — 82043 UR ALBUMIN QUANTITATIVE: CPT

## 2022-12-15 ENCOUNTER — OFFICE VISIT (OUTPATIENT)
Dept: ENDOCRINOLOGY | Facility: CLINIC | Age: 77
End: 2022-12-15

## 2022-12-15 VITALS
WEIGHT: 194 LBS | DIASTOLIC BLOOD PRESSURE: 72 MMHG | HEIGHT: 71 IN | OXYGEN SATURATION: 96 % | HEART RATE: 73 BPM | BODY MASS INDEX: 27.16 KG/M2 | SYSTOLIC BLOOD PRESSURE: 130 MMHG

## 2022-12-15 DIAGNOSIS — I95.1 ORTHOSTATIC HYPOTENSION: ICD-10-CM

## 2022-12-15 DIAGNOSIS — E03.9 ACQUIRED HYPOTHYROIDISM: ICD-10-CM

## 2022-12-15 DIAGNOSIS — E78.2 MIXED HYPERLIPIDEMIA: ICD-10-CM

## 2022-12-15 DIAGNOSIS — E10.65 TYPE 1 DIABETES MELLITUS WITH HYPERGLYCEMIA: Primary | ICD-10-CM

## 2022-12-15 LAB — GLUCOSE BLDC GLUCOMTR-MCNC: 101 MG/DL (ref 70–105)

## 2022-12-15 PROCEDURE — 82962 GLUCOSE BLOOD TEST: CPT | Performed by: INTERNAL MEDICINE

## 2022-12-15 PROCEDURE — 99214 OFFICE O/P EST MOD 30 MIN: CPT | Performed by: INTERNAL MEDICINE

## 2022-12-15 NOTE — PROGRESS NOTES
Endocrine Progress Note Outpatient     Patient Care Team:  Tammy Rodas MD as PCP - General (Family Medicine)  Manuel Camacho MD as Consulting Physician (Cardiology)  Julian Quezada MD as Consulting Physician (Cardiology)    Chief Complaint: Follow-up type 1 diabetes    HPI: 77-year-old male with history of type 1 diabetes, hypertension, hyperlipidemia abnormal thyroid tests is here for follow-up.  He has high mario 65 antibodies been mid normal C-peptide therefore he is a still on metformin 500 twice a day along with Lantus 5 units subcu daily.  Blood sugars at home are doing very well.  I reviewed his records and are mostly less than 120.  He checks his blood sugars twice a day. He does not have low blood sugars of less than 70.      Hyperlipidemia: On simvastatin.    Hypothyroidism: He is currently on levothyroxine supplementation.      He tells me that he has been having excessive fatigue and tiredness and also [facility] and [facility] did drop blood pressure when he stood up from laying down.  They prescribed midodrine.  He is also having some nausea low appetite and has lost 19 lbs since last seen.     Past Medical History:   Diagnosis Date   • AV block, complete ([facility]) 3/22/2022   • Benign prostatic hyperplasia 2021    Prostate cancer   • Cataract    • Diabetes mellitus type I (HCC)    • Hyperlipidemia    • Hypertension    • Injury of back    • Prostate cancer (HCC) 2021    Dr. Jones   • Shortness of breath    • Skin cancer    • Sleep apnea [id number]    Cpap 11 retested 2009 no changes   • Thyroid disease        Social History     Socioeconomic History   • Marital status:      Spouse name: Kalina   • Number of children: 1   • Years of education: 12   Tobacco Use   • Smoking status: Former     Packs/day: 0.00     Years: 0.00     Pack years: 0.00     Types: Pipe, Cigarettes   • Smokeless tobacco: Never   Vaping Use   • Vaping Use: Never used   Substance and Sexual  Activity   • Alcohol use: Yes     Comment: Socially   • Drug use: Never   • Sexual activity: Never       Family History   Problem Relation Age of Onset   • Stroke Mother    • Heart attack Sister    • Cancer Sister         colon   • Stroke Sister    • Cancer Brother         lung   • Alcohol abuse Brother    • No Known Problems Father    • No Known Problems Maternal Aunt    • No Known Problems Maternal Uncle    • No Known Problems Paternal Aunt    • No Known Problems Paternal Uncle    • No Known Problems Maternal Grandmother    • No Known Problems Maternal Grandfather    • No Known Problems Paternal Grandmother    • No Known Problems Paternal Grandfather    • No Known Problems Other    • Anemia Neg Hx    • Arrhythmia Neg Hx    • Asthma Neg Hx    • Clotting disorder Neg Hx    • Fainting Neg Hx    • Heart disease Neg Hx    • Heart failure Neg Hx    • Hyperlipidemia Neg Hx    • Hypertension Neg Hx        No Known Allergies    ROS:   Constitutional:  Admit fatigue, tiredness.    Eyes:  Denies change in visual acuity   HENT:  Denies nasal congestion or sore throat   Respiratory: denies cough, shortness of breath.   Cardiovascular:  denies chest pain, edema   GI:  Denies abdominal pain, nausea, vomiting.   Musculoskeletal:  Denies back pain or joint pain   Integument:  Denies dry skin and rash   Neurologic:  Denies headache, focal weakness or sensory changes   Endocrine:  Denies polyuria or polydipsia   Psychiatric:  Denies depression or anxiety      Vitals:    12/15/22 0928   BP: 130/72   Pulse: 73   SpO2: 96%       Physical Exam:  GEN: NAD, conversant  EYES: EOMI, PERRL, no conjunctival erythema  NECK: no thyromegaly, full ROM   CV: RRR, no murmurs/rubs/gallops, no peripheral edema  LUNG: CTAB, no wheezes/rales/ronchi  SKIN: no rashes, no acanthosis  MSK: no deformities, full ROM of all extremities  NEURO: no tremors, DTR normal  PSYCH: AOX3, appropriate mood, affect normal  FEET: No ulcer or calluses. 10 gm monofilament  intact on both feet.     Results Review:     I reviewed the patient's new clinical results.    Lab Results   Component Value Date    HGBA1C 6.2 (H) 12/08/2022    HGBA1C 6.5 (H) 05/03/2022    HGBA1C 6.9 (H) 02/08/2022      Lab Results   Component Value Date    GLUCOSE 96 12/08/2022    BUN 18 12/08/2022    CREATININE 1.16 12/08/2022    EGFRIFNONA 57 (L) 02/08/2022    BCR 15.5 12/08/2022    K 4.5 12/08/2022    CO2 27.0 12/08/2022    CALCIUM 9.7 12/08/2022    ALBUMIN 4.40 12/08/2022    LABIL2 1.6 05/14/2019    AST 23 12/08/2022    ALT 21 12/08/2022    CHOL 124 12/08/2022    TRIG 59 12/08/2022    LDL 69 12/08/2022    HDL 42 12/08/2022     Lab Results   Component Value Date    TSH 4.690 (H) 12/08/2022    FREET4 1.08 12/08/2022    THYROIDAB <9 06/04/2021         Medication Review: Reviewed.       Current Outpatient Medications:   •  ACCU-CHEK FASTCLIX LANCETS misc, Use to check blood sugar twice daily, Disp: , Rfl:   •  alfuzosin (UROXATRAL) 10 MG 24 hr tablet, TAKE 1 TABLET EVERY DAY, Disp: 90 tablet, Rfl: 1  •  aspirin 81 MG tablet, ASPIRIN 81 MG ORAL TABLET, Disp: , Rfl:   •  atorvastatin (LIPITOR) 40 MG tablet, TAKE 1 TABLET EVERY DAY, Disp: 90 tablet, Rfl: 1  •  cetirizine (zyrTEC) 10 MG tablet, , Disp: , Rfl:   •  Cholecalciferol (Vitamin D3) 50 MCG (2000 UT) capsule, Take 2,000 Units by mouth Daily., Disp: , Rfl:   •  diphenoxylate-atropine (Lomotil) 2.5-0.025 MG per tablet, Take 1 tablet by mouth 4 (Four) Times a Day As Needed for Diarrhea., Disp: 60 tablet, Rfl: 0  •  fluticasone (FLONASE) 50 MCG/ACT nasal spray, As Needed., Disp: , Rfl:   •  glucose blood test strip, Use to check blood sugar twice daily, Disp: , Rfl:   •  Insulin Glargine (LANTUS SOLOSTAR) 100 UNIT/ML injection pen, LANTUS SOLOSTAR 100 UNIT/ML SOPN, Disp: , Rfl:   •  Insulin Pen Needle 31G X 8 MM misc, BD PEN NEEDLE SHORT U/F 31G X 8 MM, Disp: , Rfl:   •  levothyroxine (SYNTHROID, LEVOTHROID) 25 MCG tablet, Take 25 mcg by mouth Daily., Disp: ,  "Rfl:   •  meclizine (ANTIVERT) 25 MG tablet, Take 1 tablet by mouth 3 (Three) Times a Day As Needed for Dizziness., Disp: 15 tablet, Rfl: 0  •  metFORMIN (GLUCOPHAGE) 500 MG tablet, Take 500 mg by mouth 2 (Two) Times a Day With Meals., Disp: , Rfl:   •  montelukast (SINGULAIR) 10 MG tablet, TAKE 1 TABLET EVERY DAY, Disp: 90 tablet, Rfl: 2  •  Multiple Vitamins-Minerals (MULTI VITAMIN/MINERALS) tablet, MULTI VITAMIN/MINERALS TABS, Disp: , Rfl:   •  pantoprazole (PROTONIX) 40 MG EC tablet, TAKE 1 TABLET EVERY DAY, Disp: 90 tablet, Rfl: 1  •  vitamin B-12 (CYANOCOBALAMIN) 1000 MCG tablet, Take 1,000 mcg by mouth Daily., Disp: , Rfl:   •  Vitamin D, Ergocalciferol, 50 MCG (2000 UT) capsule, Take 2,000 Units by mouth Daily., Disp: 100 capsule, Rfl: 4      Assessment/Plan   1. Diabetes mellitus type 1 with Hyperglycemia: Excellent control, A1c 6.2%.  Continue current medications.  Always keep glucose source in case of low blood sugar also advised to get regular eye exam and flu vaccine.    2. Hyperlipidemia: Continue atorvastatin and follow lipid panel.    3.  Hypothyroidism: Uncontrolled, TSH was high and he has been having issues with hypotension orthostatic, as well as fatigue and tiredness, no do an ACTH stim test to rule out [place]/cortisol deficiency.  Therefore for now we will continue his current dose of levothyroxine.    4.  Hypotension: We will check ACTH stimulation test.            Sania Mays MD FACE.        EMR Dragon / transcription disclaimer:     \"Dictated utilizing Dragon dictation\".                 "

## 2022-12-15 NOTE — PATIENT INSTRUCTIONS
Please arrange for ACTH stimulation test in the next few days  Continue current medications otherwise  Always keep glucose source in case of low blood sugar  Annual eye exam and flu vaccine  Labs before follow-up.

## 2022-12-20 ENCOUNTER — TELEPHONE (OUTPATIENT)
Dept: ENDOCRINOLOGY | Facility: CLINIC | Age: 77
End: 2022-12-20

## 2022-12-20 NOTE — TELEPHONE ENCOUNTER
Order for stim test faxed to [facility] for scheduling.     No PA required. Ref# [id number]

## 2022-12-29 ENCOUNTER — HOSPITAL ENCOUNTER (OUTPATIENT)
Dept: INFUSION THERAPY | Facility: HOSPITAL | Age: 77
Discharge: HOME | End: 2022-12-29
Admitting: INTERNAL MEDICINE
Payer: MEDICARE

## 2022-12-29 VITALS
BODY MASS INDEX: 25.73 KG/M2 | DIASTOLIC BLOOD PRESSURE: 70 MMHG | TEMPERATURE: 98.4 F | HEART RATE: 68 BPM | WEIGHT: 190 LBS | OXYGEN SATURATION: 98 % | SYSTOLIC BLOOD PRESSURE: 114 MMHG | RESPIRATION RATE: 16 BRPM | HEIGHT: 72 IN

## 2022-12-29 DIAGNOSIS — E10.65 TYPE 1 DIABETES MELLITUS WITH HYPERGLYCEMIA: ICD-10-CM

## 2022-12-29 DIAGNOSIS — E03.9 ACQUIRED HYPOTHYROIDISM: ICD-10-CM

## 2022-12-29 DIAGNOSIS — I95.1 ORTHOSTATIC HYPOTENSION: ICD-10-CM

## 2022-12-29 LAB
CORTIS 1H P CHAL SERPL-MCNC: 21.5 MCG/DL
CORTIS 30M P ACTH SERPL-MCNC: 18.59 MCG/DL
CORTIS BS SERPL-MCNC: 13.84 MCG/DL (ref 4–22)

## 2022-12-29 PROCEDURE — 96372 THER/PROPH/DIAG INJ SC/IM: CPT

## 2022-12-29 PROCEDURE — 82533 TOTAL CORTISOL: CPT | Performed by: INTERNAL MEDICINE

## 2022-12-29 PROCEDURE — 25010000002 COSYNTROPIN PER 0.25 MG: Performed by: INTERNAL MEDICINE

## 2022-12-29 RX ORDER — COSYNTROPIN 0.25 MG/ML
0.25 INJECTION, POWDER, FOR SOLUTION INTRAMUSCULAR; INTRAVENOUS ONCE
Status: COMPLETED | OUTPATIENT
Start: 2022-12-29 | End: 2022-12-29

## 2022-12-29 RX ADMIN — COSYNTROPIN 0.25 MG: 0.25 INJECTION, POWDER, LYOPHILIZED, FOR SOLUTION INTRAMUSCULAR; INTRAVENOUS at 08:35

## 2023-02-18 PROCEDURE — 93294 REM INTERROG EVL PM/LDLS PM: CPT | Performed by: INTERNAL MEDICINE

## 2023-02-18 PROCEDURE — 93296 REM INTERROG EVL PM/IDS: CPT | Performed by: INTERNAL MEDICINE

## 2023-03-20 RX ORDER — PANTOPRAZOLE SODIUM 40 MG/1
TABLET, DELAYED RELEASE ORAL
Qty: 90 TABLET | Refills: 1 | Status: SHIPPED | OUTPATIENT
Start: 2023-03-20

## 2023-04-05 RX ORDER — ATORVASTATIN CALCIUM 40 MG/1
TABLET, FILM COATED ORAL
Qty: 90 TABLET | Refills: 1 | Status: SHIPPED | OUTPATIENT
Start: 2023-04-05

## 2023-04-05 RX ORDER — ALFUZOSIN HYDROCHLORIDE 10 MG/1
TABLET, EXTENDED RELEASE ORAL
Qty: 90 TABLET | Refills: 1 | Status: SHIPPED | OUTPATIENT
Start: 2023-04-05

## 2023-05-18 ENCOUNTER — TELEPHONE (OUTPATIENT)
Dept: FAMILY MEDICINE CLINIC | Facility: CLINIC | Age: 78
End: 2023-05-18

## 2023-05-18 NOTE — TELEPHONE ENCOUNTER
"  Caller: Floyd Herrera \"Paul\"    Relationship to patient: Self    Best call back number: [phone]    Patient is needing: PATIENT STATES HE IS HAVING PAIN RUNNING DOWN THE CALF IN HIS LEG. ASKING TO BE SEEN 5-19-23, NO APPOINTMENTS AVAILABLE WITH DR MYERS  PLEASE ADVISE           "

## 2023-05-18 NOTE — TELEPHONE ENCOUNTER
Patient was called and he states that he has pain in the calf and it is red and warm to the touch/ he denies any numbness or tingling/ He stated that he was walking in the woods earlier this week. I advised him since it is after hours to go to [facility] to be evaluated tonight. Patient verbally understood.

## 2023-05-19 ENCOUNTER — HOSPITAL ENCOUNTER (OUTPATIENT)
Dept: CARDIOLOGY | Facility: HOSPITAL | Age: 78
Discharge: HOME | End: 2023-05-19
Payer: MEDICARE

## 2023-05-19 DIAGNOSIS — M79.661 RIGHT CALF PAIN: ICD-10-CM

## 2023-05-19 LAB
BH CV LOW VAS RIGHT LESSER SAPH VESSEL: 1
BH CV LOW VAS RIGHT VARICOSITY BK VESSEL: 1
BH CV LOWER VASCULAR LEFT COMMON FEMORAL AUGMENT: NORMAL
BH CV LOWER VASCULAR LEFT COMMON FEMORAL COMPETENT: NORMAL
BH CV LOWER VASCULAR LEFT COMMON FEMORAL COMPRESS: NORMAL
BH CV LOWER VASCULAR LEFT COMMON FEMORAL PHASIC: NORMAL
BH CV LOWER VASCULAR LEFT COMMON FEMORAL SPONT: NORMAL
BH CV LOWER VASCULAR RIGHT COMMON FEMORAL AUGMENT: NORMAL
BH CV LOWER VASCULAR RIGHT COMMON FEMORAL COMPETENT: NORMAL
BH CV LOWER VASCULAR RIGHT COMMON FEMORAL COMPRESS: NORMAL
BH CV LOWER VASCULAR RIGHT COMMON FEMORAL PHASIC: NORMAL
BH CV LOWER VASCULAR RIGHT COMMON FEMORAL SPONT: NORMAL
BH CV LOWER VASCULAR RIGHT DISTAL FEMORAL COMPRESS: NORMAL
BH CV LOWER VASCULAR RIGHT GASTRONEMIUS COMPRESS: NORMAL
BH CV LOWER VASCULAR RIGHT GREATER SAPH AK COMPRESS: NORMAL
BH CV LOWER VASCULAR RIGHT GREATER SAPH BK COMPRESS: NORMAL
BH CV LOWER VASCULAR RIGHT LESSER SAPH COMPRESS: NORMAL
BH CV LOWER VASCULAR RIGHT LESSER SAPH THROMBUS: NORMAL
BH CV LOWER VASCULAR RIGHT MID FEMORAL AUGMENT: NORMAL
BH CV LOWER VASCULAR RIGHT MID FEMORAL COMPETENT: NORMAL
BH CV LOWER VASCULAR RIGHT MID FEMORAL COMPRESS: NORMAL
BH CV LOWER VASCULAR RIGHT MID FEMORAL PHASIC: NORMAL
BH CV LOWER VASCULAR RIGHT MID FEMORAL SPONT: NORMAL
BH CV LOWER VASCULAR RIGHT PERONEAL COMPRESS: NORMAL
BH CV LOWER VASCULAR RIGHT POPLITEAL AUGMENT: NORMAL
BH CV LOWER VASCULAR RIGHT POPLITEAL COMPETENT: NORMAL
BH CV LOWER VASCULAR RIGHT POPLITEAL COMPRESS: NORMAL
BH CV LOWER VASCULAR RIGHT POPLITEAL PHASIC: NORMAL
BH CV LOWER VASCULAR RIGHT POPLITEAL SPONT: NORMAL
BH CV LOWER VASCULAR RIGHT POSTERIOR TIBIAL COMPRESS: NORMAL
BH CV LOWER VASCULAR RIGHT PROXIMAL FEMORAL COMPRESS: NORMAL
BH CV LOWER VASCULAR RIGHT SAPHENOFEMORAL JUNCTION COMPRESS: NORMAL
BH CV LOWER VASCULAR RIGHT VARICOSITY BK COMPRESS: NORMAL
BH CV LOWER VASCULAR RIGHT VARICOSITY BK THROMBUS: NORMAL
BH CV VAS PRELIMINARY FINDINGS SCRIPTING: 1
MAXIMAL PREDICTED HEART RATE: 145 BPM
STRESS TARGET HR: 123 BPM

## 2023-05-19 PROCEDURE — 93971 EXTREMITY STUDY: CPT

## 2023-05-31 ENCOUNTER — TELEPHONE (OUTPATIENT)
Dept: CARDIOLOGY | Facility: CLINIC | Age: 78
End: 2023-05-31

## 2023-05-31 NOTE — TELEPHONE ENCOUNTER
Patient has a MRI compatible [employer] pacemaker [id number] with atrial lead 5007 and ventricular lead 4074. His device is MRI OK, recommend he check with orthopedic MD for knee replacement safety. Spoke to patient.

## 2023-05-31 NOTE — TELEPHONE ENCOUNTER
Patient stopped in office and says he is going MRI of prostate in a few weeks.  [facility] wants to know what type of pacemaker he has.  As he thought about it, he has 2 partial knee replacements and isn't sure they can do an MRI anyway.  Please call Paul or his wife.

## 2023-07-25 ENCOUNTER — CLINICAL SUPPORT NO REQUIREMENTS (OUTPATIENT)
Dept: CARDIOLOGY | Facility: CLINIC | Age: 78
End: 2023-07-25
Payer: MEDICARE

## 2023-07-25 ENCOUNTER — OFFICE VISIT (OUTPATIENT)
Dept: CARDIOLOGY | Facility: CLINIC | Age: 78
End: 2023-07-25
Payer: MEDICARE

## 2023-07-25 VITALS
SYSTOLIC BLOOD PRESSURE: 140 MMHG | OXYGEN SATURATION: 96 % | DIASTOLIC BLOOD PRESSURE: 78 MMHG | HEIGHT: 72 IN | BODY MASS INDEX: 28.31 KG/M2 | HEART RATE: 63 BPM | WEIGHT: 209 LBS

## 2023-07-25 DIAGNOSIS — Z95.0 PRESENCE OF CARDIAC PACEMAKER: Primary | ICD-10-CM

## 2023-07-25 DIAGNOSIS — I49.5 SICK SINUS SYNDROME: ICD-10-CM

## 2023-07-25 DIAGNOSIS — Z95.0 PRESENCE OF CARDIAC PACEMAKER: ICD-10-CM

## 2023-07-25 DIAGNOSIS — I44.2 AV BLOCK, COMPLETE: Primary | ICD-10-CM

## 2023-07-25 DIAGNOSIS — I44.2 AV BLOCK, COMPLETE: ICD-10-CM

## 2023-07-25 PROCEDURE — 93280 PM DEVICE PROGR EVAL DUAL: CPT | Performed by: INTERNAL MEDICINE

## 2023-07-25 PROCEDURE — 1160F RVW MEDS BY RX/DR IN RCRD: CPT | Performed by: INTERNAL MEDICINE

## 2023-07-25 PROCEDURE — 1159F MED LIST DOCD IN RCRD: CPT | Performed by: INTERNAL MEDICINE

## 2023-07-25 PROCEDURE — 99214 OFFICE O/P EST MOD 30 MIN: CPT | Performed by: INTERNAL MEDICINE

## 2023-07-25 PROCEDURE — 3078F DIAST BP <80 MM HG: CPT | Performed by: INTERNAL MEDICINE

## 2023-07-25 PROCEDURE — 3077F SYST BP >= 140 MM HG: CPT | Performed by: INTERNAL MEDICINE

## 2023-07-25 PROCEDURE — 93000 ELECTROCARDIOGRAM COMPLETE: CPT | Performed by: INTERNAL MEDICINE

## 2023-07-25 NOTE — PROGRESS NOTES
Progress note      Name: Floyd Herrera ADMIT: (Not on file)   : 1945  PCP: Tammy Rodas MD    MRN: [id number] LOS: 0 days   AGE/SEX: 77 y.o. male  ROOM: Room/bed info not found     Chief Complaint   Patient presents with    Follow-up     1 yr       Subjective       History of present illness  Floyd Herrera is a 77-year-old male patient who has complete heart block, dual-chamber pacemaker with generator change out in 2021, prostate cancer, diabetes, here today for follow-up.  He is doing well, denies having any chest pain or shortness of breath no palpitations no lower extremity edema no syncopal episodes.    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 Jones    Shortness of breath     Skin cancer     Sleep apnea [id number]    Cpap 11 retested  no changes    Thyroid disease      Past Surgical History:   Procedure Laterality Date    BACK SURGERY      CARDIAC CATHETERIZATION      CARDIAC ELECTROPHYSIOLOGY PROCEDURE N/A 2021    Procedure: PPM generator change - dual;  Surgeon: Manuel Camacho MD;  Location: [facility] CATH INVASIVE LOCATION;  Service: Cardiovascular;  Laterality: N/A;    CARDIOVASCULAR STRESS TEST      Medications induced sress test    CARPAL TUNNEL RELEASE      CATARACT EXTRACTION      COLONOSCOPY  2017    GALLBLADDER SURGERY      HERNIA REPAIR      KNEE SURGERY      PACEMAKER REPLACEMENT       Family History   Problem Relation Age of Onset    Stroke Mother     Heart attack Sister     Cancer Sister         colon    Stroke Sister     Cancer Brother         lung    Alcohol abuse Brother     No Known Problems Father     No Known Problems Maternal Aunt     No Known Problems Maternal Uncle     No Known Problems Paternal Aunt     No Known Problems  Paternal Uncle     No Known Problems Maternal Grandmother     No Known Problems Maternal Grandfather     No Known Problems Paternal Grandmother     No Known Problems Paternal Grandfather     No Known Problems Other     Anemia Neg Hx     Arrhythmia Neg Hx     Asthma Neg Hx     Clotting disorder Neg Hx     Fainting Neg Hx     Heart disease Neg Hx     Heart failure Neg Hx     Hyperlipidemia Neg Hx     Hypertension Neg Hx      Social History     Tobacco Use    Smoking status: Former     Types: Pipe    Smokeless tobacco: Never   Vaping Use    Vaping Use: Never used   Substance Use Topics    Alcohol use: Yes     Comment: Socially    Drug use: Never     (Not in a hospital admission)    Allergies:  Patient has no known allergies.      Physical Exam  VITALS REVIEWED    General:      well developed, in no acute distress.    Head:      normocephalic and atraumatic.    Eyes:      PERRL/EOM intact, conjunctiva and sclera clear with out nystagmus.    Neck:      no masses, thyromegaly,  trachea central with normal respiratory effort and PMI displaced laterally  Lungs:      Clear to auscultation bilaterally  Heart:       Regular rate and rhythm, systolic murmur  Msk:      no deformity or scoliosis noted of thoracic or lumbar spine.    Pulses:      pulses normal in all 4 extremities.    Extremities:       No lower extremity edema  Neurologic:      no focal deficits.   alert oriented x3  Skin:      intact without lesions or rashes.    Psych:      alert and cooperative; normal mood and affect; normal attention span and concentration.      Result Review :               Pertinent cardiac workup    1.  EKG 6/9/2021 sinus rhythm, ventricular pacing  2.  Echo 7/22/2022 ejection fraction 60%.           ECG 12 Lead    Date/Time: 7/25/2023 2:06 PM  Performed by: Julian Quezada MD  Authorized by: Julian Quezada MD   Comparison: compared with previous ECG   Similar to previous ECG  Rhythm comments: Sinus rhythm with ventricular  pacing  Rate: normal  BPM: 61  QRS axis: normal  Other findings: non-specific ST-T wave changes            Assessment and Plan      Floyd Mendoza a 77-year-old male patient was complete heart block and a dual-chamber pacemaker here today for follow-up. Patient is doing well denies having any anginal symptoms or CHF symptoms. His pacemaker interrogation had shown 100% RV pacing.  He does not have RV pacing cardiomyopathy, has mild AS.  For now we will continue same therapy and I will see him in follow-up in 1 year.     Diagnoses and all orders for this visit:    1. AV block, complete (Primary)    2. Presence of cardiac pacemaker           Return in about 1 year (around 7/25/2024).  Patient was given instructions and counseling regarding his condition or for health maintenance advice. Please see specific information pulled into the AVS if appropriate.

## 2023-07-26 ENCOUNTER — OFFICE VISIT (OUTPATIENT)
Dept: FAMILY MEDICINE CLINIC | Facility: CLINIC | Age: 78
End: 2023-07-26
Payer: MEDICARE

## 2023-07-26 VITALS
HEIGHT: 72 IN | WEIGHT: 207.4 LBS | DIASTOLIC BLOOD PRESSURE: 80 MMHG | RESPIRATION RATE: 16 BRPM | SYSTOLIC BLOOD PRESSURE: 128 MMHG | TEMPERATURE: 98.4 F | OXYGEN SATURATION: 92 % | HEART RATE: 72 BPM | BODY MASS INDEX: 28.09 KG/M2

## 2023-07-26 DIAGNOSIS — Z00.00 MEDICARE ANNUAL WELLNESS VISIT, SUBSEQUENT: Primary | ICD-10-CM

## 2023-07-26 NOTE — PROGRESS NOTES
The ABCs of the Annual Wellness Visit  Subsequent Medicare Wellness Visit    Subjective      Floyd Herrera is a 77 y.o. male who presents for a Subsequent Medicare Wellness Visit.    The following portions of the patient's history were reviewed and   updated as appropriate: allergies, current medications, past family history, past medical history, past social history, past surgical history, and problem list.    Compared to one year ago, the patient feels his physical   health is the same.    Compared to one year ago, the patient feels his mental   health is the same.    Recent Hospitalizations:  He was not admitted to the hospital during the last year.       Current Medical Providers:  Patient Care Team:  Tammy Rodas MD as PCP - General (Family Medicine)  Manuel Camacho MD as Consulting Physician (Cardiology)  Julian Quezada MD as Consulting Physician (Cardiology)    Outpatient Medications Prior to Visit   Medication Sig Dispense Refill    ACCU-CHEK FASTCLIX LANCETS misc Use to check blood sugar twice daily      alfuzosin (UROXATRAL) 10 MG 24 hr tablet TAKE 1 TABLET EVERY DAY 90 tablet 1    aspirin 81 MG tablet ASPIRIN 81 MG ORAL TABLET      atorvastatin (LIPITOR) 40 MG tablet TAKE 1 TABLET EVERY DAY 90 tablet 1    cetirizine (zyrTEC) 10 MG tablet       fluticasone (FLONASE) 50 MCG/ACT nasal spray As Needed.      glucose blood test strip Use to check blood sugar twice daily      Insulin Glargine (LANTUS SOLOSTAR) 100 UNIT/ML injection pen LANTUS SOLOSTAR 100 UNIT/ML SOPN      Insulin Pen Needle 31G X 8 MM misc BD PEN NEEDLE SHORT U/F 31G X 8 MM      levothyroxine (SYNTHROID, LEVOTHROID) 25 MCG tablet Take 1 tablet by mouth Daily. 90 tablet 3    montelukast (SINGULAIR) 10 MG tablet TAKE 1 TABLET EVERY DAY 90 tablet 2    Multiple Vitamins-Minerals (MULTI VITAMIN/MINERALS) tablet MULTI VITAMIN/MINERALS TABS      pantoprazole (PROTONIX) 40 MG EC tablet TAKE 1 TABLET EVERY DAY 90 tablet 1     No  "facility-administered medications prior to visit.       No opioid medication identified on active medication list. I have reviewed chart for other potential  high risk medication/s and harmful drug interactions in the elderly.        Aspirin is on active medication list. Aspirin use is indicated based on review of current medical condition/s. Pros and cons of this therapy have been discussed today. Benefits of this medication outweigh potential harm.  Patient has been encouraged to continue taking this medication.  .      Patient Active Problem List   Diagnosis    Acquired hallux rigidus    Benign prostatic hyperplasia with urinary obstruction    Encounter for immunization    Medicare annual wellness visit, subsequent    Mixed hyperlipidemia    Hypertension, benign    Hypogonadism    Impaired fasting glucose    Need for prophylactic vaccination with combined diphtheria-tetanus-pertussis (DTP) vaccine    Osteoarthritis    Presence of cardiac pacemaker    Sick sinus syndrome    Reactive airway disease    Sleep apnea    Type 1 diabetes mellitus with hyperglycemia    Acquired hypothyroidism    Gastroesophageal reflux disease without esophagitis    Dizziness    Subdural hygroma    Light headedness    Need for vaccination    AV block, complete    Combined form of senile cataract    Posterior vitreous detachment    Presbyopia    Pseudophakia of right eye    Orthostatic hypotension     Advance Care Planning   Advance Care Planning     Advance Directive is not on file.  ACP discussion was held with the patient during this visit. Patient does not have an advance directive, information provided.     Objective    Vitals:    07/26/23 0757   BP: 128/80   Pulse: 72   Resp: 16   Temp: 98.4 °F (36.9 °C)   SpO2: 92%   Weight: 94.1 kg (207 lb 6.4 oz)   Height: 182.9 cm (72\")     Estimated body mass index is 28.13 kg/m² as calculated from the following:    Height as of this encounter: 182.9 cm (72\").    Weight as of this encounter: " 94.1 kg (207 lb 6.4 oz).    BMI is >= 25 and <30. (Overweight) The following options were offered after discussion;: exercise counseling/recommendations  Physical Exam  Vitals and nursing note reviewed. Exam conducted with a chaperone present.   Constitutional:       General: He is not in acute distress.     Appearance: He is well-developed.   HENT:      Head: Normocephalic.   Eyes:      General: Lids are normal.      Conjunctiva/sclera: Conjunctivae normal.   Neck:      Thyroid: No thyroid mass or thyromegaly.      Trachea: Trachea normal.   Cardiovascular:      Rate and Rhythm: Normal rate and regular rhythm.      Heart sounds: Normal heart sounds.   Pulmonary:      Effort: Pulmonary effort is normal.      Breath sounds: Normal breath sounds.   Abdominal:      Palpations: Abdomen is soft.   Musculoskeletal:      Cervical back: Normal range of motion.      Right lower leg: No edema.      Left lower leg: No edema.   Lymphadenopathy:      Cervical: No cervical adenopathy.   Skin:     General: Skin is warm and dry.   Neurological:      Mental Status: He is alert and oriented to person, place, and time.   Psychiatric:         Attention and Perception: He is attentive.         Mood and Affect: Mood normal.         Speech: Speech normal.         Behavior: Behavior normal.         Does the patient have evidence of cognitive impairment?   No    Lab Results   Component Value Date    CHLPL 143 07/05/2023    TRIG 78 07/05/2023    HDL 50 07/05/2023    LDL 78 07/05/2023    VLDL 15 07/05/2023    HGBA1C 7.2 (H) 07/05/2023    HGBA1C 7.2 07/05/2023          HEALTH RISK ASSESSMENT    Smoking Status:  Social History     Tobacco Use   Smoking Status Former    Types: Pipe   Smokeless Tobacco Never     Alcohol Consumption:  Social History     Substance and Sexual Activity   Alcohol Use Yes    Comment: Socially     Fall Risk Screen:    STEADI Fall Risk Assessment was completed, and patient is at LOW risk for falls.Assessment completed  on:2023    Depression Screenin/26/2023     8:07 AM   PHQ-2/PHQ-9 Depression Screening   Little Interest or Pleasure in Doing Things 0-->not at all   Feeling Down, Depressed or Hopeless 0-->not at all   PHQ-9: Brief Depression Severity Measure Score 0       Health Habits and Functional and Cognitive Screenin/26/2023     8:00 AM   Functional & Cognitive Status   Do you have difficulty preparing food and eating? No   Do you have difficulty bathing yourself, getting dressed or grooming yourself? No   Do you have difficulty using the toilet? No   Do you have difficulty moving around from place to place? No   Do you have trouble with steps or getting out of a bed or a chair? No   Current Diet Well Balanced Diet   Dental Exam Up to date   Eye Exam Up to date   Exercise (times per week) 6 times per week   Current Exercises Include Walking   Do you need help using the phone?  No   Are you deaf or do you have serious difficulty hearing?  Yes   Do you need help to go to places out of walking distance? No   Do you need help shopping? No   Do you need help preparing meals?  No   Do you need help with housework?  No   Do you need help with laundry? No   Do you need help taking your medications? No   Do you ever drive or ride in a car without wearing a seat belt? No   Have you felt unusual stress, anger or loneliness in the last month? No   Who do you live with? Spouse   If you need help, do you have trouble finding someone available to you? No   Do you have difficulty concentrating, remembering or making decisions? No       Age-appropriate Screening Schedule:  Refer to the list below for future screening recommendations based on patient's age, sex and/or medical conditions. Orders for these recommended tests are listed in the plan section. The patient has been provided with a written plan.    Health Maintenance   Topic Date Due    COVID-19 Vaccine (5 - Pfizer series) 2023 (Originally 2022)     ZOSTER VACCINE (1 of 2) 07/26/2024 (Originally 9/27/1997)    INFLUENZA VACCINE  10/01/2023    DIABETIC FOOT EXAM  11/01/2023    HEMOGLOBIN A1C  01/05/2024    DIABETIC EYE EXAM  02/20/2024    LIPID PANEL  07/05/2024    URINE MICROALBUMIN  07/05/2024    ANNUAL WELLNESS VISIT  07/26/2024    TDAP/TD VACCINES (2 - Td or Tdap) 11/05/2024    COLORECTAL CANCER SCREENING  08/26/2032    HEPATITIS C SCREENING  Completed    Pneumococcal Vaccine 65+  Completed    AAA SCREEN (ONE-TIME)  Completed                  CMS Preventative Services Quick Reference  Risk Factors Identified During Encounter:    Inactivity/Sedentary: Patient was advised to exercise at least 150 minutes a week per CDC recommendations.  Dental Screening Recommended  Vision Screening Recommended    The above risks/problems have been discussed with the patient.  Pertinent information has been shared with the patient in the After Visit Summary.    Diagnoses and all orders for this visit:    1. Medicare annual wellness visit, subsequent (Primary)        Follow Up:   Next Medicare Wellness visit to be scheduled in 1 year.      An After Visit Summary and PPPS were made available to the patient.

## 2023-08-19 PROCEDURE — 93294 REM INTERROG EVL PM/LDLS PM: CPT | Performed by: INTERNAL MEDICINE

## 2023-08-19 PROCEDURE — 93296 REM INTERROG EVL PM/IDS: CPT | Performed by: INTERNAL MEDICINE

## 2023-10-31 RX ORDER — PANTOPRAZOLE SODIUM 40 MG/1
TABLET, DELAYED RELEASE ORAL
Qty: 90 TABLET | Refills: 1 | Status: SHIPPED | OUTPATIENT
Start: 2023-10-31

## 2023-12-08 RX ORDER — ALFUZOSIN HYDROCHLORIDE 10 MG/1
TABLET, EXTENDED RELEASE ORAL
Qty: 90 TABLET | Refills: 3 | Status: SHIPPED | OUTPATIENT
Start: 2023-12-08

## 2023-12-08 RX ORDER — ATORVASTATIN CALCIUM 40 MG/1
TABLET, FILM COATED ORAL
Qty: 90 TABLET | Refills: 3 | Status: SHIPPED | OUTPATIENT
Start: 2023-12-08

## 2024-01-19 ENCOUNTER — OFFICE VISIT (OUTPATIENT)
Dept: FAMILY MEDICINE CLINIC | Facility: CLINIC | Age: 79
End: 2024-01-19
Payer: MEDICARE

## 2024-01-19 VITALS
RESPIRATION RATE: 16 BRPM | SYSTOLIC BLOOD PRESSURE: 133 MMHG | WEIGHT: 212.4 LBS | HEART RATE: 66 BPM | DIASTOLIC BLOOD PRESSURE: 84 MMHG | OXYGEN SATURATION: 93 % | BODY MASS INDEX: 28.77 KG/M2 | HEIGHT: 72 IN | TEMPERATURE: 98.4 F

## 2024-01-19 DIAGNOSIS — E03.9 HYPOTHYROIDISM, UNSPECIFIED TYPE: ICD-10-CM

## 2024-01-19 DIAGNOSIS — I10 HYPERTENSION, BENIGN: Primary | ICD-10-CM

## 2024-01-19 DIAGNOSIS — E78.2 MIXED HYPERLIPIDEMIA: ICD-10-CM

## 2024-01-19 PROCEDURE — 1160F RVW MEDS BY RX/DR IN RCRD: CPT | Performed by: FAMILY MEDICINE

## 2024-01-19 PROCEDURE — 1159F MED LIST DOCD IN RCRD: CPT | Performed by: FAMILY MEDICINE

## 2024-01-19 PROCEDURE — 3079F DIAST BP 80-89 MM HG: CPT | Performed by: FAMILY MEDICINE

## 2024-01-19 PROCEDURE — 3075F SYST BP GE 130 - 139MM HG: CPT | Performed by: FAMILY MEDICINE

## 2024-01-19 PROCEDURE — 99214 OFFICE O/P EST MOD 30 MIN: CPT | Performed by: FAMILY MEDICINE

## 2024-01-19 RX ORDER — MONTELUKAST SODIUM 10 MG/1
10 TABLET ORAL DAILY
Qty: 90 TABLET | Refills: 2 | Status: SHIPPED | OUTPATIENT
Start: 2024-01-19

## 2024-01-19 RX ORDER — PANTOPRAZOLE SODIUM 40 MG/1
40 TABLET, DELAYED RELEASE ORAL DAILY
Qty: 90 TABLET | Refills: 3 | Status: SHIPPED | OUTPATIENT
Start: 2024-01-19

## 2024-01-19 RX ORDER — ATORVASTATIN CALCIUM 40 MG/1
40 TABLET, FILM COATED ORAL DAILY
Qty: 90 TABLET | Refills: 3 | Status: SHIPPED | OUTPATIENT
Start: 2024-01-19

## 2024-01-19 NOTE — PROGRESS NOTES
"Chief Complaint  Hyperlipidemia (Follow Up) and Hypothyroidism (Follow Up)    Subjective        Floyd Herrera presents to [facility]  History of Present Illness  [facility]  Cough  This is a recurrent problem. The current episode started today. The problem has been coming and going. The problem occurs intermittent. The cough is Dry. Pertinent negatives include no chest pain, chills, ear congestion, ear pain, fever, headaches, heartburn, hemoptysis, myalgias, nasal congestion, postnasal drip, rash, rhinorrhea, sore throat, shortness of breath, sweats, weight loss or wheezing. Nothing aggravates the symptoms.   Hyperlipidemia  This is a chronic problem. The problem is controlled. Exacerbating diseases include hypothyroidism. Pertinent negatives include no chest pain, myalgias or shortness of breath. Current antihyperlipidemic treatment includes statins. The current treatment provides moderate improvement of lipids. There are no compliance problems.    Hypothyroidism  This is a chronic problem. The current episode started more than 1 year ago. The problem has been unchanged. Associated symptoms include coughing. Pertinent negatives include no chest pain, chills, fever, headaches, myalgias, rash or sore throat.       Objective   Vital Signs:  /84 (BP Location: Left arm, Patient Position: Sitting, Cuff Size: Large Adult)   Pulse 66   Temp 98.4 °F (36.9 °C) (Infrared)   Resp 16   Ht 182.9 cm (72\")   Wt 96.3 kg (212 lb 6.4 oz)   SpO2 93%   BMI 28.81 kg/m²   Estimated body mass index is 28.81 kg/m² as calculated from the following:    Height as of this encounter: 182.9 cm (72\").    Weight as of this encounter: 96.3 kg (212 lb 6.4 oz).     BMI is >= 25 and <30. (Overweight) The following options were offered after discussion;: exercise counseling/recommendations           Physical Exam  Vitals and nursing note reviewed.   Constitutional:  "      General: He is not in acute distress.     Appearance: He is well-developed.   HENT:      Head: Normocephalic.   Eyes:      General: Lids are normal.   Neck:      Thyroid: No thyroid mass or thyromegaly.      Trachea: Trachea normal.   Cardiovascular:      Rate and Rhythm: Normal rate and regular rhythm.      Heart sounds: Murmur heard.      Systolic murmur is present with a grade of 2/6.   Pulmonary:      Effort: Pulmonary effort is normal.      Breath sounds: Normal breath sounds.   Musculoskeletal:      Cervical back: Normal range of motion.      Right lower leg: No edema.      Left lower leg: No edema.   Lymphadenopathy:      Cervical: No cervical adenopathy.   Skin:     General: Skin is warm and dry.   Neurological:      Mental Status: He is alert and oriented to person, place, and time.   Psychiatric:         Attention and Perception: He is attentive.         Mood and Affect: Mood normal.         Speech: Speech normal.         Behavior: Behavior normal.        Result Review :    The following data was reviewed by: Tammy Rodas MD on 01/19/2024:  Common labs          7/5/2023    00:00 7/5/2023    09:02   Common Labs   Glucose  146    BUN  21    Creatinine  1.44    Sodium  141    Potassium  5.5    Chloride  102    Calcium  10.0    Total Protein  6.8    Albumin  4.8    Total Bilirubin  1.1    Alkaline Phosphatase  80    AST (SGOT)  22    ALT (SGPT)  20    Total Cholesterol  143    Triglycerides  78    HDL Cholesterol  50    LDL Cholesterol   78    Hemoglobin A1C 7.2     7.2    Microalbumin, Urine  59.3       Details          This result is from an external source.                          Assessment and Plan     Diagnoses and all orders for this visit:    1. Hypertension, benign (Primary)  Assessment & Plan:  Hypertension is improving with treatment.  Continue current treatment regimen.  Blood pressure will be reassessed at the next regular appointment.      2. Mixed hyperlipidemia  Assessment &  Plan:  Lipid abnormalities are improving with treatment.  Pharmacotherapy as ordered.  Lipids will be reassessed in 6 months.    Orders:  -     atorvastatin (LIPITOR) 40 MG tablet; Take 1 tablet by mouth Daily.  Dispense: 90 tablet; Refill: 3    3. Hypothyroidism, unspecified type    Other orders  -     Insulin Glargine (LANTUS SOLOSTAR) 100 UNIT/ML injection pen; 5 units qam, 3 units qpm  -     pantoprazole (PROTONIX) 40 MG EC tablet; Take 1 tablet by mouth Daily.  Dispense: 90 tablet; Refill: 3  -     montelukast (SINGULAIR) 10 MG tablet; Take 1 tablet by mouth Daily.  Dispense: 90 tablet; Refill: 2             Follow Up     No follow-ups on file.  Patient was given instructions and counseling regarding his condition or for health maintenance advice. Please see specific information pulled into the AVS if appropriate.         Answers submitted by the patient for this visit:  Primary Reason for Visit (Submitted on 1/18/2024)  What is the primary reason for your visit?: Cough

## 2024-02-12 ENCOUNTER — OFFICE VISIT (OUTPATIENT)
Dept: ENDOCRINOLOGY | Facility: CLINIC | Age: 79
End: 2024-02-12
Payer: MEDICARE

## 2024-02-12 VITALS
HEART RATE: 65 BPM | BODY MASS INDEX: 28.99 KG/M2 | DIASTOLIC BLOOD PRESSURE: 75 MMHG | SYSTOLIC BLOOD PRESSURE: 125 MMHG | OXYGEN SATURATION: 96 % | HEIGHT: 72 IN | WEIGHT: 214 LBS

## 2024-02-12 DIAGNOSIS — E10.65 TYPE 1 DIABETES MELLITUS WITH HYPERGLYCEMIA: Primary | ICD-10-CM

## 2024-02-12 DIAGNOSIS — E03.9 ACQUIRED HYPOTHYROIDISM: ICD-10-CM

## 2024-02-12 DIAGNOSIS — E78.2 MIXED HYPERLIPIDEMIA: ICD-10-CM

## 2024-02-12 LAB — GLUCOSE BLDC GLUCOMTR-MCNC: 315 MG/DL (ref 70–105)

## 2024-02-12 PROCEDURE — 3074F SYST BP LT 130 MM HG: CPT | Performed by: INTERNAL MEDICINE

## 2024-02-12 PROCEDURE — 1160F RVW MEDS BY RX/DR IN RCRD: CPT | Performed by: INTERNAL MEDICINE

## 2024-02-12 PROCEDURE — 99214 OFFICE O/P EST MOD 30 MIN: CPT | Performed by: INTERNAL MEDICINE

## 2024-02-12 PROCEDURE — 1159F MED LIST DOCD IN RCRD: CPT | Performed by: INTERNAL MEDICINE

## 2024-02-12 PROCEDURE — 3078F DIAST BP <80 MM HG: CPT | Performed by: INTERNAL MEDICINE

## 2024-02-12 PROCEDURE — G2211 COMPLEX E/M VISIT ADD ON: HCPCS | Performed by: INTERNAL MEDICINE

## 2024-02-12 PROCEDURE — 82948 REAGENT STRIP/BLOOD GLUCOSE: CPT | Performed by: INTERNAL MEDICINE

## 2024-02-12 RX ORDER — INSULIN LISPRO 100 [IU]/ML
INJECTION, SOLUTION INTRAVENOUS; SUBCUTANEOUS
Qty: 45 ML | Refills: 3 | Status: SHIPPED | OUTPATIENT
Start: 2024-02-12

## 2024-02-12 RX ORDER — MIRABEGRON 25 MG/1
TABLET, FILM COATED, EXTENDED RELEASE ORAL
COMMUNITY
Start: 2024-01-25

## 2024-02-22 ENCOUNTER — TELEPHONE (OUTPATIENT)
Dept: CARDIOLOGY | Facility: CLINIC | Age: 79
End: 2024-02-22
Payer: MEDICARE

## 2024-02-22 NOTE — TELEPHONE ENCOUNTER
Patient had a remote on 12/19 and next is due on 3/19/24. He has 7.8 years battery, leads stable. AP 99.7% and  98.9%.  Spoke to patient, informed him of information.

## 2024-07-30 ENCOUNTER — CLINICAL SUPPORT NO REQUIREMENTS (OUTPATIENT)
Dept: CARDIOLOGY | Facility: CLINIC | Age: 79
End: 2024-07-30
Payer: MEDICARE

## 2024-07-30 ENCOUNTER — OFFICE VISIT (OUTPATIENT)
Dept: CARDIOLOGY | Facility: CLINIC | Age: 79
End: 2024-07-30
Payer: MEDICARE

## 2024-07-30 VITALS
WEIGHT: 212.5 LBS | HEART RATE: 66 BPM | SYSTOLIC BLOOD PRESSURE: 114 MMHG | DIASTOLIC BLOOD PRESSURE: 75 MMHG | HEIGHT: 72 IN | BODY MASS INDEX: 28.78 KG/M2 | OXYGEN SATURATION: 97 %

## 2024-07-30 DIAGNOSIS — I49.5 SICK SINUS SYNDROME: ICD-10-CM

## 2024-07-30 DIAGNOSIS — Z95.0 PRESENCE OF CARDIAC PACEMAKER: ICD-10-CM

## 2024-07-30 DIAGNOSIS — I44.2 AV BLOCK, COMPLETE: ICD-10-CM

## 2024-07-30 DIAGNOSIS — I35.0 AORTIC STENOSIS, MILD: Primary | ICD-10-CM

## 2024-07-30 DIAGNOSIS — I44.2 AV BLOCK, COMPLETE: Primary | ICD-10-CM

## 2024-07-30 PROCEDURE — 99213 OFFICE O/P EST LOW 20 MIN: CPT | Performed by: INTERNAL MEDICINE

## 2024-07-30 PROCEDURE — 3078F DIAST BP <80 MM HG: CPT | Performed by: INTERNAL MEDICINE

## 2024-07-30 PROCEDURE — 93280 PM DEVICE PROGR EVAL DUAL: CPT | Performed by: INTERNAL MEDICINE

## 2024-07-30 PROCEDURE — 3074F SYST BP LT 130 MM HG: CPT | Performed by: INTERNAL MEDICINE

## 2024-07-30 NOTE — PROGRESS NOTES
Progress note      Name: Floyd Herrera ADMIT: (Not on file)   : 1945  PCP: Tammy Rodas MD    MRN: [id number] LOS: 0 days   AGE/SEX: 78 y.o. male  ROOM: Room/bed info not found     Chief Complaint   Patient presents with    Follow-up     1yr w/device       Subjective       History of present illness  Floyd Herrera is a 78-year-old male patient who has complete heart block, dual-chamber pacemaker with generator change out in 2021, prostate cancer, diabetes, here today for follow-up. He is doing well, denies having any chest pain or shortness of breath no palpitations no lower extremity edema no syncopal episodes.     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 Jones    Shortness of breath     Skin cancer     Sleep apnea [id number]    Cpap 11 retested  no changes    Thyroid disease      Past Surgical History:   Procedure Laterality Date    BACK SURGERY      CARDIAC CATHETERIZATION      CARDIAC ELECTROPHYSIOLOGY PROCEDURE N/A 2021    Procedure: PPM generator change - dual;  Surgeon: Manuel Camacho MD;  Location: [facility] CATH INVASIVE LOC[facility];  Service: Cardiovascular;  Laterality: N/A;    CARDIOVASCULAR STRESS TEST      Medications induced sress test    CARPAL TUNNEL RELEASE      CATARACT EXTRACTION      COLONOSCOPY  2017    GALLBLADDER SURGERY      HERNIA REPAIR      KNEE SURGERY      PACEMAKER REPLACEMENT       Family History   Problem Relation Age of Onset    Stroke Mother     Heart attack Sister     Cancer Sister         colon    Stroke Sister     Cancer Brother         lung    Alcohol abuse Brother     No Known Problems Father     No Known Problems Maternal Aunt     No Known Problems Maternal Uncle     No Known Problems Paternal Aunt     No Known  Problems Paternal Uncle     No Known Problems Maternal Grandmother     No Known Problems Maternal Grandfather     No Known Problems Paternal Grandmother     No Known Problems Paternal Grandfather     No Known Problems Other     Anemia Neg Hx     Arrhythmia Neg Hx     Asthma Neg Hx     Clotting disorder Neg Hx     Fainting Neg Hx     Heart disease Neg Hx     Heart failure Neg Hx     Hyperlipidemia Neg Hx     Hypertension Neg Hx      Social History     Tobacco Use    Smoking status: Former     Types: Pipe    Smokeless tobacco: Never   Vaping Use    Vaping status: Never Used   Substance Use Topics    Alcohol use: Yes     Comment: Socially    Drug use: Never       Current Outpatient Medications:     ACCU-CHEK FASTCLIX LANCETS misc, Use to check blood sugar twice daily, Disp: , Rfl:     alfuzosin (UROXATRAL) 10 MG 24 hr tablet, TAKE 1 TABLET EVERY DAY, Disp: 90 tablet, Rfl: 3    aspirin 81 MG tablet, ASPIRIN 81 MG ORAL TABLET, Disp: , Rfl:     atorvastatin (LIPITOR) 40 MG tablet, Take 1 tablet by mouth Daily., Disp: 90 tablet, Rfl: 3    cetirizine (zyrTEC) 10 MG tablet, , Disp: , Rfl:     Continuous Blood Gluc  (FreeStyle Hermelinda 2 Terrell) device, Use 1 Device Daily., Disp: 1 each, Rfl: 0    Continuous Blood Gluc Sensor (FreeStyle Hermelinda 2 Sensor) misc, Use 1 Device Daily., Disp: 2 each, Rfl: 6    fluticasone (FLONASE) 50 MCG/ACT nasal spray, As Needed., Disp: , Rfl:     glucose blood test strip, Use to check blood sugar twice daily, Disp: , Rfl:     Insulin Glargine (LANTUS SOLOSTAR) 100 UNIT/ML injection pen, Inject 15 units subcu daily, Disp: 45 mL, Rfl: 3    Insulin Lispro, 1 Unit Dial, (HumaLOG KwikPen) 100 UNIT/ML solution pen-injector, Inject 5 units before each meal 3 times a day., Disp: 45 mL, Rfl: 3    Insulin Pen Needle 31G X 8 MM misc, BD PEN NEEDLE SHORT U/F 31G X 8 MM, Disp: , Rfl:     levothyroxine (SYNTHROID, LEVOTHROID) 25 MCG tablet, Take 1 tablet by mouth Daily. (Patient taking differently: Take 2  tablets by mouth Daily.), Disp: 90 tablet, Rfl: 3    metFORMIN (GLUCOPHAGE) 500 MG tablet, Take 1 tablet by mouth 2 (Two) Times a Day With Meals., Disp: , Rfl:     montelukast (SINGULAIR) 10 MG tablet, Take 1 tablet by mouth Daily., Disp: 90 tablet, Rfl: 2    Multiple Vitamins-Minerals (MULTI VITAMIN/MINERALS) tablet, MULTI VITAMIN/MINERALS TABS, Disp: , Rfl:     Myrbetriq 25 MG tablet sustained-release 24 hour 24 hr tablet, , Disp: , Rfl:     pantoprazole (PROTONIX) 40 MG EC tablet, Take 1 tablet by mouth Daily., Disp: 90 tablet, Rfl: 3  Allergies:  Patient has no known allergies.      Physical Exam  VITALS REVIEWED    General:      well developed, in no acute distress.    Head:      normocephalic and atraumatic.    Eyes:      PERRL/EOM intact, conjunctiva and sclera clear with out nystagmus.    Neck:      no masses, thyromegaly,  trachea central with normal respiratory effort and PMI displaced laterally  Lungs:      Clear to auscultation bilaterally  Heart:       Regular rate and rhythm, systolic murmur  Msk:      no deformity or scoliosis noted of thoracic or lumbar spine.    Pulses:      pulses normal in all 4 extremities.    Extremities:       No lower extremity edema  Neurologic:      no focal deficits.   alert oriented x3  Skin:      intact without lesions or rashes.    Psych:      alert and cooperative; normal mood and affect; normal attention span and concentration.      Result Review :               Pertinent cardiac workup    1.  EKG 6/9/2021 sinus rhythm, ventricular pacing  2.  Echo 7/22/2022 ejection fraction 60%.      Procedures        Assessment and Plan      Floyd Herrera is a 78-year-old male patient was complete heart block and a dual-chamber pacemaker here today for follow-up. Patient is doing well denies having any anginal symptoms or CHF symptoms. His pacemaker interrogation had shown 100% RV pacing.  He does not have RV pacing cardiomyopathy, has mild AS.  For now we will continue same  therapy and I will see him in follow-up in 1 year with an echocardiogram to follow-up on aortic stenosis.     Diagnoses and all orders for this visit:    1. Aortic stenosis, mild (Primary)  -     Adult Transthoracic Echo Complete W/ Cont if Necessary Per Protocol; Future    2. AV block, complete    3. Sick sinus syndrome    4. Presence of cardiac pacemaker           Return in about 1 year (around 7/30/2025), or with echo.  Patient was given instructions and counseling regarding his condition or for health maintenance advice. Please see specific information pulled into the AVS if appropriate.       Electronically signed by Julian Quezada MD, 07/30/24, 2:32 PM EDT.

## 2024-08-14 ENCOUNTER — TELEPHONE (OUTPATIENT)
Dept: CARDIOLOGY | Facility: CLINIC | Age: 79
End: 2024-08-14
Payer: MEDICARE

## 2024-08-14 NOTE — TELEPHONE ENCOUNTER
FACILITY: [facility]  [facility]   PHONE: [phone]  FAX: [phone]  PROCEDURE: Fusion  prostate biopsy   SCHEDULED: 09/04/24  MEDS TO HOLD: ASA for 7-10

## 2024-09-03 ENCOUNTER — LAB (OUTPATIENT)
Dept: LAB | Facility: HOSPITAL | Age: 79
End: 2024-09-03
Payer: MEDICARE

## 2024-09-03 DIAGNOSIS — E10.65 TYPE 1 DIABETES MELLITUS WITH HYPERGLYCEMIA: ICD-10-CM

## 2024-09-03 LAB
ALBUMIN SERPL-MCNC: 4.4 G/DL (ref 3.5–5.2)
ALBUMIN UR-MCNC: 4.8 MG/DL
ALBUMIN/GLOB SERPL: 1.8 G/DL
ALP SERPL-CCNC: 98 U/L (ref 39–117)
ALT SERPL W P-5'-P-CCNC: 24 U/L (ref 1–41)
ANION GAP SERPL CALCULATED.3IONS-SCNC: 12.7 MMOL/L (ref 5–15)
AST SERPL-CCNC: 25 U/L (ref 1–40)
BILIRUB SERPL-MCNC: 0.5 MG/DL (ref 0–1.2)
BUN SERPL-MCNC: 16 MG/DL (ref 8–23)
BUN/CREAT SERPL: 12.7 (ref 7–25)
CALCIUM SPEC-SCNC: 9.4 MG/DL (ref 8.6–10.5)
CHLORIDE SERPL-SCNC: 105 MMOL/L (ref 98–107)
CHOLEST SERPL-MCNC: 135 MG/DL (ref 0–200)
CO2 SERPL-SCNC: 23.3 MMOL/L (ref 22–29)
CREAT SERPL-MCNC: 1.26 MG/DL (ref 0.76–1.27)
CREAT UR-MCNC: 164.1 MG/DL
EGFRCR SERPLBLD CKD-EPI 2021: 59.1 ML/MIN/1.73
GLOBULIN UR ELPH-MCNC: 2.5 GM/DL
GLUCOSE SERPL-MCNC: 124 MG/DL (ref 65–99)
HBA1C MFR BLD: 7.19 % (ref 4.8–5.6)
HDLC SERPL-MCNC: 43 MG/DL (ref 40–60)
LDLC SERPL CALC-MCNC: 79 MG/DL (ref 0–100)
LDLC/HDLC SERPL: 1.85 {RATIO}
MICROALBUMIN/CREAT UR: 29.3 MG/G (ref 0–29)
POTASSIUM SERPL-SCNC: 4.5 MMOL/L (ref 3.5–5.2)
PROT SERPL-MCNC: 6.9 G/DL (ref 6–8.5)
SODIUM SERPL-SCNC: 141 MMOL/L (ref 136–145)
T4 FREE SERPL-MCNC: 1.1 NG/DL (ref 0.93–1.7)
TRIGL SERPL-MCNC: 62 MG/DL (ref 0–150)
TSH SERPL DL<=0.05 MIU/L-ACNC: 2.95 UIU/ML (ref 0.27–4.2)
VLDLC SERPL-MCNC: 13 MG/DL (ref 5–40)

## 2024-09-03 PROCEDURE — 36415 COLL VENOUS BLD VENIPUNCTURE: CPT

## 2024-09-03 PROCEDURE — 83036 HEMOGLOBIN GLYCOSYLATED A1C: CPT

## 2024-09-03 PROCEDURE — 84443 ASSAY THYROID STIM HORMONE: CPT

## 2024-09-03 PROCEDURE — 82570 ASSAY OF URINE CREATININE: CPT

## 2024-09-03 PROCEDURE — 84439 ASSAY OF FREE THYROXINE: CPT

## 2024-09-03 PROCEDURE — 80053 COMPREHEN METABOLIC PANEL: CPT

## 2024-09-03 PROCEDURE — 80061 LIPID PANEL: CPT

## 2024-09-03 PROCEDURE — 82043 UR ALBUMIN QUANTITATIVE: CPT

## 2024-09-09 RX ORDER — CIPROFLOXACIN 500 MG/1
1 TABLET, FILM COATED ORAL EVERY 12 HOURS SCHEDULED
COMMUNITY
Start: 2024-08-12 | End: 2024-09-10

## 2024-09-10 ENCOUNTER — OFFICE VISIT (OUTPATIENT)
Dept: ENDOCRINOLOGY | Facility: CLINIC | Age: 79
End: 2024-09-10
Payer: MEDICARE

## 2024-09-10 VITALS
BODY MASS INDEX: 28.44 KG/M2 | SYSTOLIC BLOOD PRESSURE: 132 MMHG | HEIGHT: 72 IN | HEART RATE: 80 BPM | WEIGHT: 210 LBS | OXYGEN SATURATION: 97 % | DIASTOLIC BLOOD PRESSURE: 75 MMHG

## 2024-09-10 DIAGNOSIS — E03.9 ACQUIRED HYPOTHYROIDISM: ICD-10-CM

## 2024-09-10 DIAGNOSIS — E10.65 TYPE 1 DIABETES MELLITUS WITH HYPERGLYCEMIA: Primary | ICD-10-CM

## 2024-09-10 DIAGNOSIS — E78.2 MIXED HYPERLIPIDEMIA: ICD-10-CM

## 2024-09-10 PROCEDURE — 99214 OFFICE O/P EST MOD 30 MIN: CPT | Performed by: INTERNAL MEDICINE

## 2024-09-10 PROCEDURE — 1159F MED LIST DOCD IN RCRD: CPT | Performed by: INTERNAL MEDICINE

## 2024-09-10 PROCEDURE — 1160F RVW MEDS BY RX/DR IN RCRD: CPT | Performed by: INTERNAL MEDICINE

## 2024-09-10 PROCEDURE — 95251 CONT GLUC MNTR ANALYSIS I&R: CPT | Performed by: INTERNAL MEDICINE

## 2024-09-10 PROCEDURE — 3075F SYST BP GE 130 - 139MM HG: CPT | Performed by: INTERNAL MEDICINE

## 2024-09-10 PROCEDURE — 3078F DIAST BP <80 MM HG: CPT | Performed by: INTERNAL MEDICINE

## 2024-09-10 NOTE — PATIENT INSTRUCTIONS
Continue current management except that do not take Humalog if the mealtime blood sugars less than 100.  Continue to use freestyle luba sensor system  Always keep glucose source in case of low blood sugar  Labs before follow-up.

## 2024-09-10 NOTE — PROGRESS NOTES
Endocrine Progress Note Outpatient     Patient Care Team:  Tammy Rodas MD as PCP - General (Family Medicine)  Manuel Camacho MD as Consulting Physician (Cardiology)  Julian Quezada MD as Consulting Physician (Cardiology)    Chief Complaint: Follow-up type 1 diabetes    HPI: 78-year-old male with history of type 1 diabetes, hypertension, hyperlipidemia abnormal thyroid tests is here for follow-up.      He has high mario 65 antibodies been mid normal C-peptide.  He is currently using Lantus 15 units in the morning and 5 units of Humalog before each meal if the blood sugar is higher than 140 at meals only.  He is also on metformin 500 mg once a day.  He tells me that he is having fluctuating blood sugars.  He is trying to work on his diet the best he can.     Hyperlipidemia: He is currently on atorvastatin.    Hypothyroidism: He is currently on levothyroxine supplementation.        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 Jones    Shortness of breath     Skin cancer     Sleep apnea [id number]    Cpap 11 retested 2009 no changes    Thyroid disease        Social History     Socioeconomic History    Marital status:      Spouse name: Kalina    Number of children: 1    Years of education: 12   Tobacco Use    Smoking status: Former     Types: Pipe    Smokeless tobacco: Never   Vaping Use    Vaping status: Never Used   Substance and Sexual Activity    Alcohol use: Yes     Comment: Socially    Drug use: Never    Sexual activity: Never       Family History   Problem Relation Age of Onset    Stroke Mother     Heart attack Sister     Cancer Sister         colon    Stroke Sister     Cancer Brother         lung    Alcohol abuse Brother     No Known Problems Father     No Known Problems Maternal Aunt      No Known Problems Maternal Uncle     No Known Problems Paternal Aunt     No Known Problems Paternal Uncle     No Known Problems Maternal Grandmother     No Known Problems Maternal Grandfather     No Known Problems Paternal Grandmother     No Known Problems Paternal Grandfather     No Known Problems Other     Anemia Neg Hx     Arrhythmia Neg Hx     Asthma Neg Hx     Clotting disorder Neg Hx     Fainting Neg Hx     Heart disease Neg Hx     Heart failure Neg Hx     Hyperlipidemia Neg Hx     Hypertension Neg Hx        No Known Allergies    ROS:   Constitutional:  Admit fatigue, tiredness.    Eyes:  Denies change in visual acuity   HENT:  Denies nasal congestion or sore throat   Respiratory: denies cough, shortness of breath.   Cardiovascular:  denies chest pain, edema   GI:  Denies abdominal pain, nausea, vomiting.   Musculoskeletal:  Denies back pain or joint pain   Integument:  Denies dry skin and rash   Neurologic:  Denies headache, focal weakness or sensory changes   Endocrine:  Denies polyuria or polydipsia   Psychiatric:  Denies depression or anxiety      Vitals:    09/10/24 1305   BP: 132/75   Pulse: 80   SpO2: 97%     BMI 29  Physical Exam:  GEN: NAD, conversant  EYES: EOMI,   NECK: no thyromegaly  CV: RRR,   LUNG: CTA  PSYCH: AOX3, appropriate mood, affect normal  FEET: No ulcer or calluses. 10 gm monofilament intact on both feet.     Results Review:     I reviewed the patient's new clinical results.    Lab Results   Component Value Date    HGBA1C 7.19 (H) 09/03/2024    HGBA1C 7.2 (H) 07/05/2023    HGBA1C 7.2 07/05/2023      Lab Results   Component Value Date    GLUCOSE 124 (H) 09/03/2024    BUN 16 09/03/2024    CREATININE 1.26 09/03/2024    EGFRIFNONA 57 (L) 02/08/2022    BCR 12.7 09/03/2024    K 4.5 09/03/2024    CO2 23.3 09/03/2024    CALCIUM 9.4 09/03/2024    PROTENTOTREF 6.8 07/05/2023    ALBUMIN 4.4 09/03/2024    LABIL2 2.4 (H) 07/05/2023    AST 25 09/03/2024    ALT 24 09/03/2024    CHOL 135 09/03/2024     TRIG 62 09/03/2024    LDL 79 09/03/2024    HDL 43 09/03/2024     Lab Results   Component Value Date    TSH 2.950 09/03/2024    FREET4 1.10 09/03/2024    THYROIDAB <9 06/04/2021     Freestyle hermelinda download interpretation: Dates covered was between August 28, 2024 to September 10, 2024.  Average blood sugar is 150.  Spending 81% in the range and 19% above range and 0% below range.  He does have some postprandial hyperglycemia especially in the morning.      Medication Review: Reviewed.       Current Outpatient Medications:     ACCU-CHEK FASTCLIX LANCETS misc, Use to check blood sugar twice daily, Disp: , Rfl:     alfuzosin (UROXATRAL) 10 MG 24 hr tablet, TAKE 1 TABLET EVERY DAY, Disp: 90 tablet, Rfl: 3    aspirin 81 MG tablet, ASPIRIN 81 MG ORAL TABLET, Disp: , Rfl:     atorvastatin (LIPITOR) 40 MG tablet, Take 1 tablet by mouth Daily., Disp: 90 tablet, Rfl: 3    cetirizine (zyrTEC) 10 MG tablet, , Disp: , Rfl:     Continuous Blood Gluc  (FreeStyle Hermelinda 2 [place]) device, Use 1 Device Daily., Disp: 1 each, Rfl: 0    Continuous Blood Gluc Sensor (FreeStyle Hermelinda 2 Sensor) misc, Use 1 Device Daily., Disp: 2 each, Rfl: 6    fluticasone (FLONASE) 50 MCG/ACT nasal spray, As Needed., Disp: , Rfl:     glucose blood test strip, Use to check blood sugar twice daily, Disp: , Rfl:     Insulin Glargine (LANTUS SOLOSTAR) 100 UNIT/ML injection pen, Inject 15 units subcu daily, Disp: 45 mL, Rfl: 3    Insulin Lispro, 1 Unit Dial, (HumaLOG KwikPen) 100 UNIT/ML solution pen-injector, Inject 5 units before each meal 3 times a day., Disp: 45 mL, Rfl: 3    Insulin Pen Needle 31G X 8 MM misc, BD PEN NEEDLE SHORT U/F 31G X 8 MM, Disp: , Rfl:     levothyroxine (SYNTHROID, LEVOTHROID) 25 MCG tablet, Take 1 tablet by mouth Daily. (Patient taking differently: Take 2 tablets by mouth Daily.), Disp: 90 tablet, Rfl: 3    metFORMIN (GLUCOPHAGE) 500 MG tablet, Take 1 tablet by mouth Daily With Breakfast., Disp: , Rfl:     montelukast  "(SINGULAIR) 10 MG tablet, Take 1 tablet by mouth Daily., Disp: 90 tablet, Rfl: 2    Multiple Vitamins-Minerals (MULTI VITAMIN/MINERALS) tablet, MULTI VITAMIN/MINERALS TABS, Disp: , Rfl:     Myrbetriq 25 MG tablet sustained-release 24 hour 24 hr tablet, , Disp: , Rfl:     pantoprazole (PROTONIX) 40 MG EC tablet, Take 1 tablet by mouth Daily., Disp: 90 tablet, Rfl: 3    ciprofloxacin (CIPRO) 500 MG tablet, Take 1 tablet by mouth Every 12 (Twelve) Hours. (Patient not taking: Reported on 9/10/2024), Disp: , Rfl:       Assessment/Plan   1. Diabetes mellitus type 1 with Hyperglycemia: Overall doing well with A1c now at 7.1%.  No low blood sugars noted on the freestyle luba but he is having some low blood sugars.  Has not required any assistance or hospitalization.  I have asked him to continue his current regimen except that he will not take his mealtime insulin if the mealtime blood sugars less than 100.  He verbalized understanding.  Will follow blood sugars and A1c.    2. Hyperlipidemia: Well-controlled, will continue atorvastatin.    3.  Hypothyroidism: Well-controlled, will continue levothyroxine.    Assessment and plan from February 12, 2024 reviewed and updated.                    Sania Mays MD FACE.        EMR Dragon / transcription disclaimer:     \"Dictated utilizing Dragon dictation\".                 "

## 2024-10-07 RX ORDER — MONTELUKAST SODIUM 10 MG/1
10 TABLET ORAL DAILY
Qty: 90 TABLET | Refills: 2 | Status: SHIPPED | OUTPATIENT
Start: 2024-10-07

## 2024-10-29 ENCOUNTER — OFFICE VISIT (OUTPATIENT)
Dept: FAMILY MEDICINE CLINIC | Facility: CLINIC | Age: 79
End: 2024-10-29
Payer: MEDICARE

## 2024-10-29 VITALS
TEMPERATURE: 98.9 F | WEIGHT: 217.2 LBS | HEART RATE: 62 BPM | OXYGEN SATURATION: 94 % | DIASTOLIC BLOOD PRESSURE: 71 MMHG | SYSTOLIC BLOOD PRESSURE: 124 MMHG | HEIGHT: 72 IN | BODY MASS INDEX: 29.42 KG/M2

## 2024-10-29 DIAGNOSIS — Z00.00 MEDICARE ANNUAL WELLNESS VISIT, SUBSEQUENT: Primary | ICD-10-CM

## 2024-10-29 PROCEDURE — 3078F DIAST BP <80 MM HG: CPT | Performed by: FAMILY MEDICINE

## 2024-10-29 PROCEDURE — 3074F SYST BP LT 130 MM HG: CPT | Performed by: FAMILY MEDICINE

## 2024-10-29 PROCEDURE — G0439 PPPS, SUBSEQ VISIT: HCPCS | Performed by: FAMILY MEDICINE

## 2024-10-29 PROCEDURE — 1159F MED LIST DOCD IN RCRD: CPT | Performed by: FAMILY MEDICINE

## 2024-10-29 PROCEDURE — 1160F RVW MEDS BY RX/DR IN RCRD: CPT | Performed by: FAMILY MEDICINE

## 2024-10-29 PROCEDURE — 1126F AMNT PAIN NOTED NONE PRSNT: CPT | Performed by: FAMILY MEDICINE

## 2024-10-29 NOTE — PROGRESS NOTES
Subjective   The ABCs of the Annual Wellness Visit  Medicare Wellness Visit      Floyd Herrera is a 79 y.o. patient who presents for a Medicare Wellness Visit.    The following portions of the patient's history were reviewed and   updated as appropriate: allergies, current medications, past family history, past medical history, past social history, past surgical history, and problem list.    Compared to one year ago, the patient's physical   health is the same.  Compared to one year ago, the patient's mental   health is the same.    Recent Hospitalizations:  He was not admitted to the hospital during the last year.     Current Medical Providers:  Patient Care Team:  Tammy Rodas MD as PCP - General (Family Medicine)  Manuel Camacho MD as Consulting Physician (Cardiology)  Julian Quezada MD as Consulting Physician (Cardiology)  Sania Mays MD as Consulting Physician (Endocrinology)  Inderjit Jones MD as Consulting Physician (Urology)    Outpatient Medications Prior to Visit   Medication Sig Dispense Refill    ACCU-CHEK FASTCLIX LANCETS misc Use to check blood sugar twice daily      alfuzosin (UROXATRAL) 10 MG 24 hr tablet TAKE 1 TABLET EVERY DAY 90 tablet 3    aspirin 81 MG tablet ASPIRIN 81 MG ORAL TABLET      atorvastatin (LIPITOR) 40 MG tablet Take 1 tablet by mouth Daily. 90 tablet 3    cetirizine (zyrTEC) 10 MG tablet       Continuous Blood Gluc  (FreeStyle Hermelinda 2 [place]) device Use 1 Device Daily. 1 each 0    Continuous Blood Gluc Sensor (FreeStyle Hermelinda 2 Sensor) misc Use 1 Device Daily. 2 each 6    fluticasone (FLONASE) 50 MCG/ACT nasal spray As Needed.      glucose blood test strip Use to check blood sugar twice daily      Insulin Glargine (LANTUS SOLOSTAR) 100 UNIT/ML injection pen Inject 15 units subcu daily 45 mL 3    Insulin Lispro, 1 Unit Dial, (HumaLOG KwikPen) 100 UNIT/ML solution pen-injector Inject 5 units before each meal 3 times a day. 45 mL 3    Insulin Pen  Needle 31G X 8 MM misc BD PEN NEEDLE SHORT U/F 31G X 8 MM      levothyroxine (SYNTHROID, LEVOTHROID) 25 MCG tablet Take 1 tablet by mouth Daily. (Patient taking differently: Take 2 tablets by mouth Daily.) 90 tablet 3    metFORMIN (GLUCOPHAGE) 500 MG tablet Take 1 tablet by mouth Daily With Breakfast.      montelukast (SINGULAIR) 10 MG tablet TAKE ONE TABLET BY MOUTH EVERY DAY 90 tablet 2    Multiple Vitamins-Minerals (MULTI VITAMIN/MINERALS) tablet MULTI VITAMIN/MINERALS TABS      pantoprazole (PROTONIX) 40 MG EC tablet Take 1 tablet by mouth Daily. 90 tablet 3    Myrbetriq 25 MG tablet sustained-release 24 hour 24 hr tablet        No facility-administered medications prior to visit.     No opioid medication identified on active medication list. I have reviewed chart for other potential  high risk medication/s and harmful drug interactions in the elderly.      Aspirin is on active medication list. Aspirin use is indicated based on review of current medical condition/s. Pros and cons of this therapy have been discussed today. Benefits of this medication outweigh potential harm.  Patient has been encouraged to continue taking this medication.  .      Patient Active Problem List   Diagnosis    Acquired hallux rigidus    Benign prostatic hyperplasia with urinary obstruction    Encounter for immunization    Medicare annual wellness visit, subsequent    Mixed hyperlipidemia    Hypertension, benign    Hypogonadism    Impaired fasting glucose    Need for prophylactic vaccination with combined diphtheria-tetanus-pertussis (DTP) vaccine    Osteoarthritis    Presence of cardiac pacemaker    Sick sinus syndrome    Reactive airway disease    Sleep apnea    Type 1 diabetes mellitus with hyperglycemia    Acquired hypothyroidism    Gastroesophageal reflux disease without esophagitis    Dizziness    Subdural hygroma    Light headedness    Need for vaccination    AV block, complete    Combined form of senile cataract    Posterior  "vitreous detachment    Presbyopia    Pseudophakia of right eye    Orthostatic hypotension     Advance Care Planning Advance Directive is not on file.  ACP discussion was held with the patient during this visit. Patient does not have an advance directive, information provided.            Objective   Vitals:    10/29/24 1246   BP: 124/71   BP Location: Left arm   Patient Position: Sitting   Cuff Size: Large Adult   Pulse: 62   Temp: 98.9 °F (37.2 °C)   TempSrc: Infrared   SpO2: 94%   Weight: 98.5 kg (217 lb 3.2 oz)   Height: 182.9 cm (72\")       Estimated body mass index is 29.46 kg/m² as calculated from the following:    Height as of this encounter: 182.9 cm (72\").    Weight as of this encounter: 98.5 kg (217 lb 3.2 oz).     Physical Exam  Vitals and nursing note reviewed.   Constitutional:       General: He is not in acute distress.     Appearance: He is well-developed.   HENT:      Head: Normocephalic.   Eyes:      General: Lids are normal.      Conjunctiva/sclera: Conjunctivae normal.   Neck:      Thyroid: No thyroid mass or thyromegaly.      Trachea: Trachea normal.   Cardiovascular:      Rate and Rhythm: Normal rate and regular rhythm.      Heart sounds: Normal heart sounds.   Pulmonary:      Effort: Pulmonary effort is normal.      Breath sounds: Normal breath sounds.   Musculoskeletal:      Cervical back: Normal range of motion.      Right lower leg: No edema.      Left lower leg: No edema.   Lymphadenopathy:      Cervical: No cervical adenopathy.   Skin:     General: Skin is warm and dry.   Neurological:      Mental Status: He is alert and oriented to person, place, and time.   Psychiatric:         Attention and Perception: He is attentive.         Mood and Affect: Mood normal.         Speech: Speech normal.         Behavior: Behavior normal.              Does the patient have evidence of cognitive impairment? No  Lab Results   Component Value Date    TRIG 62 09/03/2024    HDL 43 09/03/2024    LDL 79 " 09/03/2024    VLDL 13 09/03/2024    HGBA1C 7.19 (H) 09/03/2024                                                                                               Health  Risk Assessment    Smoking Status:  Social History     Tobacco Use   Smoking Status Former    Types: Pipe   Smokeless Tobacco Never     Alcohol Consumption:  Social History     Substance and Sexual Activity   Alcohol Use Yes    Comment: Socially       Fall Risk Screen  STEADI Fall Risk Assessment was completed, and patient is at LOW risk for falls.Assessment completed on:10/29/2024    Depression Screening:      10/29/2024     1:05 PM   PHQ-2/PHQ-9 Depression Screening   Little interest or pleasure in doing things Not at all   Feeling down, depressed, or hopeless Not at all     Health Habits and Functional and Cognitive Screening:      10/22/2024     1:52 PM   Functional & Cognitive Status   Do you have difficulty preparing food and eating? No    Do you have difficulty bathing yourself, getting dressed or grooming yourself? No    Do you have difficulty using the toilet? No    Do you have difficulty moving around from place to place? No    Do you have trouble with steps or getting out of a bed or a chair? No    Current Diet Well Balanced Diet    Dental Exam Up to date    Eye Exam Up to date    Exercise (times per week) Other    Current Exercises Include No Regular Exercise    Do you need help using the phone?  No    Are you deaf or do you have serious difficulty hearing?  Yes    Do you need help to go to places out of walking distance? No    Do you need help shopping? No    Do you need help preparing meals?  No    Do you need help with housework?  No    Do you need help with laundry? No    Do you need help taking your medications? No    Do you need help managing money? No    Do you ever drive or ride in a car without wearing a seat belt? No    Have you felt unusual stress, anger or loneliness in the last month? No    Who do you live with? Spouse    If you  need help, do you have trouble finding someone available to you? No    Have you been bothered in the last four weeks by sexual problems? No    Do you have difficulty concentrating, remembering or making decisions? Yes        Patient-reported           Age-appropriate Screening Schedule:  Refer to the list below for future screening recommendations based on patient's age, sex and/or medical conditions. Orders for these recommended tests are listed in the plan section. The patient has been provided with a written plan.    Health Maintenance List  Health Maintenance   Topic Date Due    ZOSTER VACCINE (1 of 2) Never done    COVID-19 Vaccine (7 - 2023-24 season) 09/01/2024    INFLUENZA VACCINE  03/31/2025 (Originally 8/1/2024)    BMI FOLLOWUP  01/19/2025    DIABETIC EYE EXAM  02/21/2025    HEMOGLOBIN A1C  03/03/2025    LIPID PANEL  09/03/2025    URINE MICROALBUMIN  09/03/2025    ANNUAL WELLNESS VISIT  10/29/2025    TDAP/TD VACCINES (3 - Td or Tdap) 12/19/2033    HEPATITIS C SCREENING  Completed    RSV Vaccine - Adults  Completed    Pneumococcal Vaccine 65+  Completed    COLORECTAL CANCER SCREENING  Discontinued                                                                                                                                                CMS Preventative Services Quick Reference  Risk Factors Identified During Encounter  Inactivity/Sedentary: Patient was advised to exercise at least 150 minutes a week per CDC recommendations.  Dental Screening Recommended  Vision Screening Recommended    The above risks/problems have been discussed with the patient.  Pertinent information has been shared with the patient in the After Visit Summary.  An After Visit Summary and PPPS were made available to the patient.    Follow Up:   Next Medicare Wellness visit to be scheduled in 1 year.     Assessment & Plan  Medicare annual wellness visit, subsequent               Follow Up:   No follow-ups on file.

## 2025-01-03 RX ORDER — PANTOPRAZOLE SODIUM 40 MG/1
40 TABLET, DELAYED RELEASE ORAL DAILY
Qty: 90 TABLET | Refills: 3 | Status: SHIPPED | OUTPATIENT
Start: 2025-01-03

## 2025-04-15 ENCOUNTER — LAB (OUTPATIENT)
Dept: ENDOCRINOLOGY | Facility: CLINIC | Age: 80
End: 2025-04-15
Payer: MEDICARE

## 2025-04-15 DIAGNOSIS — E10.65 TYPE 1 DIABETES MELLITUS WITH HYPERGLYCEMIA: ICD-10-CM

## 2025-04-16 LAB
ALBUMIN SERPL-MCNC: 4.3 G/DL (ref 3.8–4.8)
ALBUMIN/CREAT UR: 15 MG/G CREAT (ref 0–29)
ALP SERPL-CCNC: 91 IU/L (ref 44–121)
ALT SERPL-CCNC: 27 IU/L (ref 0–44)
AST SERPL-CCNC: 30 IU/L (ref 0–40)
BILIRUB SERPL-MCNC: 0.5 MG/DL (ref 0–1.2)
BUN SERPL-MCNC: 17 MG/DL (ref 8–27)
BUN/CREAT SERPL: 13 (ref 10–24)
CALCIUM SERPL-MCNC: 9.2 MG/DL (ref 8.6–10.2)
CHLORIDE SERPL-SCNC: 106 MMOL/L (ref 96–106)
CHOLEST SERPL-MCNC: 122 MG/DL (ref 100–199)
CO2 SERPL-SCNC: 21 MMOL/L (ref 20–29)
CREAT SERPL-MCNC: 1.3 MG/DL (ref 0.76–1.27)
CREAT UR-MCNC: 142.6 MG/DL
EGFRCR SERPLBLD CKD-EPI 2021: 57 ML/MIN/1.73
GLOBULIN SER CALC-MCNC: 2.2 G/DL (ref 1.5–4.5)
GLUCOSE SERPL-MCNC: 112 MG/DL (ref 70–99)
HBA1C MFR BLD: 7.7 % (ref 4.8–5.6)
HDLC SERPL-MCNC: 42 MG/DL
LDLC SERPL CALC-MCNC: 67 MG/DL (ref 0–99)
MICROALBUMIN UR-MCNC: 21.2 UG/ML
POTASSIUM SERPL-SCNC: 5.1 MMOL/L (ref 3.5–5.2)
PROT SERPL-MCNC: 6.5 G/DL (ref 6–8.5)
SODIUM SERPL-SCNC: 140 MMOL/L (ref 134–144)
T4 FREE SERPL-MCNC: 1.12 NG/DL (ref 0.82–1.77)
TRIGL SERPL-MCNC: 59 MG/DL (ref 0–149)
TSH SERPL DL<=0.005 MIU/L-ACNC: 3.65 UIU/ML (ref 0.45–4.5)
VLDLC SERPL CALC-MCNC: 13 MG/DL (ref 5–40)

## 2025-04-22 ENCOUNTER — OFFICE VISIT (OUTPATIENT)
Dept: ENDOCRINOLOGY | Facility: CLINIC | Age: 80
End: 2025-04-22
Payer: MEDICARE

## 2025-04-22 VITALS
SYSTOLIC BLOOD PRESSURE: 122 MMHG | DIASTOLIC BLOOD PRESSURE: 70 MMHG | BODY MASS INDEX: 29.12 KG/M2 | WEIGHT: 215 LBS | HEART RATE: 79 BPM | HEIGHT: 72 IN | OXYGEN SATURATION: 99 %

## 2025-04-22 DIAGNOSIS — E10.65 TYPE 1 DIABETES MELLITUS WITH HYPERGLYCEMIA: Primary | ICD-10-CM

## 2025-04-22 DIAGNOSIS — E03.9 ACQUIRED HYPOTHYROIDISM: ICD-10-CM

## 2025-04-22 DIAGNOSIS — E78.2 MIXED HYPERLIPIDEMIA: ICD-10-CM

## 2025-04-22 PROCEDURE — 1159F MED LIST DOCD IN RCRD: CPT | Performed by: INTERNAL MEDICINE

## 2025-04-22 PROCEDURE — 3078F DIAST BP <80 MM HG: CPT | Performed by: INTERNAL MEDICINE

## 2025-04-22 PROCEDURE — 95251 CONT GLUC MNTR ANALYSIS I&R: CPT | Performed by: INTERNAL MEDICINE

## 2025-04-22 PROCEDURE — 3074F SYST BP LT 130 MM HG: CPT | Performed by: INTERNAL MEDICINE

## 2025-04-22 PROCEDURE — 99214 OFFICE O/P EST MOD 30 MIN: CPT | Performed by: INTERNAL MEDICINE

## 2025-04-22 PROCEDURE — 1160F RVW MEDS BY RX/DR IN RCRD: CPT | Performed by: INTERNAL MEDICINE

## 2025-04-22 NOTE — PROGRESS NOTES
Endocrine Progress Note Outpatient     Patient Care Team:  Tammy Rodas MD as PCP - General (Family Medicine)  Manuel Camacho MD as Consulting Physician (Cardiology)  Julian Quezada MD as Consulting Physician (Cardiology)  Sania Mays MD as Consulting Physician (Endocrinology)  Inderjit Jones MD as Consulting Physician (Urology)    Chief Complaint: Follow-up type 1 diabetes    HPI: 79-year-old male with history of type 1 diabetes, hypertension, hyperlipidemia abnormal thyroid tests is here for follow-up.      Type 1 diabetes: He has high mario 65 antibodies been mid normal C-peptide.  He is currently using Lantus 15 units in the morning and 5 units of Humalog 5 units with breakfast only.  He tells me that he has not been taking Humalog at other meals.  He is also on metformin 500 mg once a day.  He is using freestyle luba sensor system.      Hyperlipidemia: He is currently on atorvastatin.    Hypothyroidism: He is currently on levothyroxine supplementation.        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 Jones    Shortness of breath     Skin cancer     Sleep apnea [id number]    Cpap 11 retested 2009 no changes    Thyroid disease        Social History     Socioeconomic History    Marital status:      Spouse name: Kalina    Number of children: 1    Years of education: 12   Tobacco Use    Smoking status: Former     Types: Pipe    Smokeless tobacco: Never   Vaping Use    Vaping status: Never Used   Substance and Sexual Activity    Alcohol use: Yes     Comment: Socially    Drug use: Never    Sexual activity: Never       Family History   Problem Relation Age of Onset    Stroke Mother     Heart attack Sister     Cancer Sister         colon    Stroke Sister     Cancer Brother         lung     Alcohol abuse Brother     No Known Problems Father     No Known Problems Maternal Aunt     No Known Problems Maternal Uncle     No Known Problems Paternal Aunt     No Known Problems Paternal Uncle     No Known Problems Maternal Grandmother     No Known Problems Maternal Grandfather     No Known Problems Paternal Grandmother     No Known Problems Paternal Grandfather     No Known Problems Other     Anemia Neg Hx     Arrhythmia Neg Hx     Asthma Neg Hx     Clotting disorder Neg Hx     Fainting Neg Hx     Heart disease Neg Hx     Heart failure Neg Hx     Hyperlipidemia Neg Hx     Hypertension Neg Hx        No Known Allergies    ROS:   Constitutional:  Admit fatigue, tiredness.    Eyes:  Denies change in visual acuity   HENT:  Denies nasal congestion or sore throat   Respiratory: denies cough, shortness of breath.   Cardiovascular:  denies chest pain, edema   GI:  Denies abdominal pain, nausea, vomiting.   Musculoskeletal:  Denies back pain or joint pain   Integument:  Denies dry skin and rash   Neurologic:  Denies headache, focal weakness or sensory changes   Endocrine:  Denies polyuria or polydipsia   Psychiatric:  Denies depression or anxiety      Vitals:    04/22/25 1256   BP: 122/70   Pulse: 79   SpO2: 99%       BMI 29.2  Physical Exam:  GEN: NAD, conversant  EYES: EOMI,   NECK: no thyromegaly  CV: RRR,   LUNG: CTA  PSYCH: AOX3, appropriate mood, affect normal  FEET: No ulcer or calluses. 10 gm monofilament intact on both feet.     Results Review:     I reviewed the patient's new clinical results.    Lab Results   Component Value Date    HGBA1C 7.7 (H) 04/15/2025    HGBA1C 7.19 (H) 09/03/2024    HGBA1C 7.2 (H) 07/05/2023      Lab Results   Component Value Date    GLUCOSE 112 (H) 04/15/2025    BUN 17 04/15/2025    CREATININE 1.30 (H) 04/15/2025    EGFRIFNONA 57 (L) 02/08/2022    BCR 13 04/15/2025    K 5.1 04/15/2025    CO2 21 04/15/2025    CALCIUM 9.2 04/15/2025    ALBUMIN 4.3 04/15/2025    AST 30 04/15/2025     ALT 27 04/15/2025    CHOL 135 09/03/2024    TRIG 59 04/15/2025    LDL 67 04/15/2025    HDL 42 04/15/2025     Lab Results   Component Value Date    TSH 3.650 04/15/2025    FREET4 1.12 04/15/2025    THYROIDAB <9 06/04/2021     Freestyle hermelinda download interpretation: Dates covered was between April 9, 2025 to April 22, 2025.  Average blood sugar is 166.  Spending about 69% in the range and 31% above range and 0% below range.  Glucose graph does show significant hyperglycemia in the morning post breakfast.      Medication Review: Reviewed.       Current Outpatient Medications:     ACCU-CHEK FASTCLIX LANCETS misc, Use to check blood sugar twice daily, Disp: , Rfl:     alfuzosin (UROXATRAL) 10 MG 24 hr tablet, TAKE 1 TABLET EVERY DAY, Disp: 90 tablet, Rfl: 3    aspirin 81 MG tablet, ASPIRIN 81 MG ORAL TABLET, Disp: , Rfl:     atorvastatin (LIPITOR) 40 MG tablet, Take 1 tablet by mouth Daily., Disp: 90 tablet, Rfl: 3    cetirizine (zyrTEC) 10 MG tablet, , Disp: , Rfl:     Continuous Blood Gluc  (FreeStyle Hermelinda 2 [place]) device, Use 1 Device Daily., Disp: 1 each, Rfl: 0    Continuous Blood Gluc Sensor (FreeStyle Hermelinda 2 Sensor) misc, Use 1 Device Daily., Disp: 2 each, Rfl: 6    fluticasone (FLONASE) 50 MCG/ACT nasal spray, As Needed., Disp: , Rfl:     glucose blood test strip, Use to check blood sugar twice daily, Disp: , Rfl:     Insulin Glargine (LANTUS SOLOSTAR) 100 UNIT/ML injection pen, Inject 15 units subcu daily, Disp: 45 mL, Rfl: 3    Insulin Lispro, 1 Unit Dial, (HumaLOG KwikPen) 100 UNIT/ML solution pen-injector, Inject 5 units before each meal 3 times a day., Disp: 45 mL, Rfl: 3    Insulin Pen Needle 31G X 8 MM misc, BD PEN NEEDLE SHORT U/F 31G X 8 MM, Disp: , Rfl:     levothyroxine (SYNTHROID, LEVOTHROID) 25 MCG tablet, Take 1 tablet by mouth Daily. (Patient taking differently: Take 2 tablets by mouth Daily.), Disp: 90 tablet, Rfl: 3    metFORMIN (GLUCOPHAGE) 500 MG tablet, Take 1 tablet by mouth Daily  "With Breakfast., Disp: , Rfl:     montelukast (SINGULAIR) 10 MG tablet, TAKE ONE TABLET BY MOUTH EVERY DAY, Disp: 90 tablet, Rfl: 2    Multiple Vitamins-Minerals (MULTI VITAMIN/MINERALS) tablet, MULTI VITAMIN/MINERALS TABS, Disp: , Rfl:     pantoprazole (PROTONIX) 40 MG EC tablet, TAKE ONE TABLET BY MOUTH EVERY DAY, Disp: 90 tablet, Rfl: 3      Assessment/Plan   1. Diabetes mellitus type 1 with Hyperglycemia: Uncontrolled with worsening of A1c at 7.7%.  I have advised him that he needs to start taking his Humalog 5 units before each meal and not just only with breakfast.  Continue Lantus 15 units subcu daily.  We talked about insulin pump in my opinion he will benefit from it.  I will send him for the pimp preview class so to make sure he understands what it entails.  He will continue to use freestyle luba sensor system.  Advised to always keep glucose source in case of low blood sugars.      2. Hyperlipidemia: Well-controlled, will continue atorvastatin.    3.  Hypothyroidism: Well-controlled, will continue levothyroxine.    Assessment and plan from September 10, 2024 reviewed and updated.                    Sania Mays MD FACE.        EMR Dragon / transcription disclaimer:     \"Dictated utilizing Dragon dictation\".                 "

## 2025-04-22 NOTE — PATIENT INSTRUCTIONS
Please take your Humalog 5 units before each meal 3 times a day  Continue rest of the medication  Always keep glucose source in case of low blood sugar  Labs before follow-up.

## 2025-04-24 ENCOUNTER — OFFICE VISIT (OUTPATIENT)
Dept: FAMILY MEDICINE CLINIC | Facility: CLINIC | Age: 80
End: 2025-04-24
Payer: MEDICARE

## 2025-04-24 VITALS
TEMPERATURE: 97.4 F | BODY MASS INDEX: 29.05 KG/M2 | SYSTOLIC BLOOD PRESSURE: 130 MMHG | HEART RATE: 72 BPM | WEIGHT: 214.5 LBS | RESPIRATION RATE: 18 BRPM | DIASTOLIC BLOOD PRESSURE: 76 MMHG | HEIGHT: 72 IN | OXYGEN SATURATION: 98 %

## 2025-04-24 DIAGNOSIS — K11.20 SIALADENITIS: Primary | ICD-10-CM

## 2025-04-24 PROCEDURE — 99213 OFFICE O/P EST LOW 20 MIN: CPT | Performed by: NURSE PRACTITIONER

## 2025-04-24 PROCEDURE — 1160F RVW MEDS BY RX/DR IN RCRD: CPT | Performed by: NURSE PRACTITIONER

## 2025-04-24 PROCEDURE — 1159F MED LIST DOCD IN RCRD: CPT | Performed by: NURSE PRACTITIONER

## 2025-04-24 PROCEDURE — 3075F SYST BP GE 130 - 139MM HG: CPT | Performed by: NURSE PRACTITIONER

## 2025-04-24 PROCEDURE — 3078F DIAST BP <80 MM HG: CPT | Performed by: NURSE PRACTITIONER

## 2025-04-24 PROCEDURE — 1126F AMNT PAIN NOTED NONE PRSNT: CPT | Performed by: NURSE PRACTITIONER

## 2025-04-24 NOTE — PROGRESS NOTES
"Chief Complaint  Earache (Left ear, draining and hard to open jaw )    Subjective        Floyd Herrera presents to [facility]  History of Present Illness    Ear pain  Onset of symptoms began 2 days ago. Severity of symptoms are moderate to severe. Status is worsening. Frequency is constant/daily. Location is right ear. Context includes sneezing, mowing grass past few days. Aggravating factors include wiggling pinna. Unrelieving factors include ear relief drops (natural ingredients) from [employer]. Associated symptoms include right jaw pain, hearing deficit (not new), fullness, pressure, and tenderness. Pertinent negatives include bleeding from ear, nasal congestion, cough, decreased appetite, dizziness, clear drainage, ear popping, fever, irritability, loss of balance, malaise, mastoid bone tenderness, nausea, vomiting, redness/swelling outer ear, ringing in ears, tooth pain.         Objective   Vital Signs:  /76 (BP Location: Left arm, Patient Position: Sitting, Cuff Size: Adult)   Pulse 72   Temp 97.4 °F (36.3 °C) (Temporal)   Resp 18   Ht 182.9 cm (72\")   Wt 97.3 kg (214 lb 8 oz)   SpO2 98%   BMI 29.09 kg/m²   Estimated body mass index is 29.09 kg/m² as calculated from the following:    Height as of this encounter: 182.9 cm (72\").    Weight as of this encounter: 97.3 kg (214 lb 8 oz).          Physical Exam  Constitutional:       General: He is not in acute distress.  HENT:      Head: Normocephalic and atraumatic.      Comments: Right TMJ negative for click/popping.      Right Ear: Ear canal and external ear normal. No drainage or swelling. No mastoid tenderness. Tympanic membrane is not erythematous.      Left Ear: Tympanic membrane, ear canal and external ear normal.      Ears:      Comments: Right TM dull gray with visual landmarks.     Nose: Nose normal.      Mouth/Throat:      Mouth: Mucous membranes are moist.      Pharynx: Oropharynx is clear. No posterior " oropharyngeal erythema.   Eyes:      Conjunctiva/sclera: Conjunctivae normal.   Cardiovascular:      Rate and Rhythm: Normal rate and regular rhythm.      Heart sounds: S1 normal and S2 normal.   Pulmonary:      Effort: Pulmonary effort is normal.      Breath sounds: Normal breath sounds.   Musculoskeletal:      Cervical back: Neck supple.   Lymphadenopathy:      Head:      Right side of head: No submandibular adenopathy.      Left side of head: No submandibular adenopathy.      Cervical: No cervical adenopathy.      Comments: Right parotid gland swelling/tenderness.  Negative erythema   Skin:     General: Skin is warm and dry.      Findings: No rash.   Neurological:      Mental Status: He is alert and oriented to person, place, and time.      Gait: Gait is intact.   Psychiatric:         Mood and Affect: Mood normal.         Thought Content: Thought content normal.        Result Review :    CMP          9/3/2024    08:23 4/15/2025    09:06   CMP   Glucose 124  112    BUN 16  17    Creatinine 1.26  1.30    EGFR 59.1  57    Sodium 141  140    Potassium 4.5  5.1    Chloride 105  106    Calcium 9.4  9.2    Total Protein 6.9  6.5    Albumin 4.4  4.3    Globulin 2.5  2.2    Total Bilirubin 0.5  0.5    Alkaline Phosphatase 98  91    AST (SGOT) 25  30    ALT (SGPT) 24  27    Albumin/Globulin Ratio 1.8     BUN/Creatinine Ratio 12.7  13    Anion Gap 12.7                4/15/25 A1C 7.7.      Assessment and Plan   Diagnoses and all orders for this visit:    1. Sialadenitis (Primary)  -     amoxicillin-clavulanate (AUGMENTIN) 875-125 MG per tablet; Take 1 tablet by mouth 2 (Two) Times a Day.  Dispense: 20 tablet; Refill: 0    Education provided at checkout.   Recommended Flonase nasal spray for allergies.  Advised follow-up with Dr. Rodas if symptoms persist or worsen.       Follow Up   Return if symptoms worsen or fail to improve.  Patient was given instructions and counseling regarding his condition or for health maintenance  advice. Please see specific information pulled into the AVS if appropriate.

## 2025-05-02 DIAGNOSIS — E78.2 MIXED HYPERLIPIDEMIA: ICD-10-CM

## 2025-05-02 RX ORDER — ATORVASTATIN CALCIUM 40 MG/1
40 TABLET, FILM COATED ORAL DAILY
Qty: 90 TABLET | Refills: 3 | Status: SHIPPED | OUTPATIENT
Start: 2025-05-02

## 2025-06-16 RX ORDER — MONTELUKAST SODIUM 10 MG/1
10 TABLET ORAL DAILY
Qty: 90 TABLET | Refills: 2 | Status: SHIPPED | OUTPATIENT
Start: 2025-06-16

## 2025-06-17 LAB
MC_CV_MDC_IDC_RATE_1: 150
MC_CV_MDC_IDC_RATE_1: 171
MC_CV_MDC_IDC_ZONE_ID: 2
MC_CV_MDC_IDC_ZONE_ID: 6
MDC_IDC_MSMT_BATTERY_REMAINING_LONGEVITY: 68 MO
MDC_IDC_MSMT_BATTERY_RRT_TRIGGER: 2.62
MDC_IDC_MSMT_BATTERY_STATUS: NORMAL
MDC_IDC_MSMT_BATTERY_VOLTAGE: 2.96
MDC_IDC_MSMT_LEADCHNL_RA_DTM: NORMAL
MDC_IDC_MSMT_LEADCHNL_RA_IMPEDANCE_VALUE: 380
MDC_IDC_MSMT_LEADCHNL_RA_PACING_THRESHOLD_AMPLITUDE: 0.62
MDC_IDC_MSMT_LEADCHNL_RA_PACING_THRESHOLD_POLARITY: NORMAL
MDC_IDC_MSMT_LEADCHNL_RA_PACING_THRESHOLD_PULSEWIDTH: 0.4
MDC_IDC_MSMT_LEADCHNL_RA_SENSING_INTR_AMPL: 1.5
MDC_IDC_MSMT_LEADCHNL_RV_DTM: NORMAL
MDC_IDC_MSMT_LEADCHNL_RV_IMPEDANCE_VALUE: 475
MDC_IDC_MSMT_LEADCHNL_RV_PACING_THRESHOLD_AMPLITUDE: 0.88
MDC_IDC_MSMT_LEADCHNL_RV_PACING_THRESHOLD_POLARITY: NORMAL
MDC_IDC_MSMT_LEADCHNL_RV_PACING_THRESHOLD_PULSEWIDTH: 0.4
MDC_IDC_MSMT_LEADCHNL_RV_SENSING_INTR_AMPL: 1.75
MDC_IDC_PG_MFG: NORMAL
MDC_IDC_PG_MODEL: NORMAL
MDC_IDC_PG_SERIAL: NORMAL
MDC_IDC_PG_TYPE: NORMAL
MDC_IDC_SESS_DTM: NORMAL
MDC_IDC_SESS_TYPE: NORMAL
MDC_IDC_SET_BRADY_AT_MODE_SWITCH_RATE: 171
MDC_IDC_SET_BRADY_LOWRATE: 60
MDC_IDC_SET_BRADY_MAX_SENSOR_RATE: 130
MDC_IDC_SET_BRADY_MAX_TRACKING_RATE: 130
MDC_IDC_SET_BRADY_MODE: NORMAL
MDC_IDC_SET_BRADY_PAV_DELAY: 180
MDC_IDC_SET_BRADY_SAV_DELAY: 150
MDC_IDC_SET_LEADCHNL_RA_PACING_AMPLITUDE: 2.25
MDC_IDC_SET_LEADCHNL_RA_PACING_POLARITY: NORMAL
MDC_IDC_SET_LEADCHNL_RA_PACING_PULSEWIDTH: 0.4
MDC_IDC_SET_LEADCHNL_RA_SENSING_POLARITY: NORMAL
MDC_IDC_SET_LEADCHNL_RA_SENSING_SENSITIVITY: 0.3
MDC_IDC_SET_LEADCHNL_RV_PACING_AMPLITUDE: 2
MDC_IDC_SET_LEADCHNL_RV_PACING_POLARITY: NORMAL
MDC_IDC_SET_LEADCHNL_RV_PACING_PULSEWIDTH: 0.4
MDC_IDC_SET_LEADCHNL_RV_SENSING_POLARITY: NORMAL
MDC_IDC_SET_LEADCHNL_RV_SENSING_SENSITIVITY: 0.9
MDC_IDC_SET_ZONE_STATUS: NORMAL
MDC_IDC_SET_ZONE_STATUS: NORMAL
MDC_IDC_SET_ZONE_TYPE: NORMAL
MDC_IDC_SET_ZONE_TYPE: NORMAL
MDC_IDC_STAT_AT_BURDEN_PERCENT: 0
MDC_IDC_STAT_BRADY_RA_PERCENT_PACED: 99.69
MDC_IDC_STAT_BRADY_RV_PERCENT_PACED: 99.82

## 2025-06-17 PROCEDURE — 93294 REM INTERROG EVL PM/LDLS PM: CPT | Performed by: INTERNAL MEDICINE

## 2025-06-17 PROCEDURE — 93296 REM INTERROG EVL PM/IDS: CPT | Performed by: INTERNAL MEDICINE

## 2025-07-22 ENCOUNTER — CLINICAL SUPPORT NO REQUIREMENTS (OUTPATIENT)
Dept: CARDIOLOGY | Facility: CLINIC | Age: 80
End: 2025-07-22
Payer: MEDICARE

## 2025-07-22 ENCOUNTER — OFFICE VISIT (OUTPATIENT)
Dept: CARDIOLOGY | Facility: CLINIC | Age: 80
End: 2025-07-22
Payer: MEDICARE

## 2025-07-22 ENCOUNTER — HOSPITAL ENCOUNTER (OUTPATIENT)
Dept: CARDIOLOGY | Facility: HOSPITAL | Age: 80
Discharge: HOME | End: 2025-07-22
Admitting: INTERNAL MEDICINE
Payer: MEDICARE

## 2025-07-22 VITALS
HEART RATE: 76 BPM | WEIGHT: 207 LBS | HEIGHT: 72 IN | SYSTOLIC BLOOD PRESSURE: 114 MMHG | DIASTOLIC BLOOD PRESSURE: 67 MMHG | OXYGEN SATURATION: 99 % | BODY MASS INDEX: 28.04 KG/M2

## 2025-07-22 VITALS
WEIGHT: 200 LBS | HEART RATE: 76 BPM | HEIGHT: 72 IN | DIASTOLIC BLOOD PRESSURE: 67 MMHG | BODY MASS INDEX: 27.09 KG/M2 | SYSTOLIC BLOOD PRESSURE: 114 MMHG

## 2025-07-22 DIAGNOSIS — I44.2 AV BLOCK, COMPLETE: Primary | ICD-10-CM

## 2025-07-22 DIAGNOSIS — I35.0 AORTIC STENOSIS, MILD: ICD-10-CM

## 2025-07-22 DIAGNOSIS — Z95.0 PRESENCE OF CARDIAC PACEMAKER: ICD-10-CM

## 2025-07-22 DIAGNOSIS — I49.5 SICK SINUS SYNDROME: ICD-10-CM

## 2025-07-22 DIAGNOSIS — I20.89 ANGINA AT REST: Primary | ICD-10-CM

## 2025-07-22 DIAGNOSIS — I44.2 AV BLOCK, COMPLETE: ICD-10-CM

## 2025-07-22 LAB
AORTIC DIMENSIONLESS INDEX: 0.38 (DI)
AV MEAN PRESS GRAD SYS DOP V1V2: 14.4 MMHG
AV VMAX SYS DOP: 230.3 CM/SEC
BH CV ECHO LEFT VENTRICLE GLOBAL LONGITUDINAL STRAIN: -13.5 %
BH CV ECHO MEAS - AO MAX PG: 21.4 MMHG
BH CV ECHO MEAS - AO ROOT DIAM: 3.2 CM
BH CV ECHO MEAS - AO V2 VTI: 47.4 CM
BH CV ECHO MEAS - AVA(I,D): 1.23 CM2
BH CV ECHO MEAS - EDV(CUBED): 128 ML
BH CV ECHO MEAS - EDV(MOD-SP2): 60.5 ML
BH CV ECHO MEAS - EDV(MOD-SP4): 92 ML
BH CV ECHO MEAS - EF(MOD-SP2): 64.9 %
BH CV ECHO MEAS - EF(MOD-SP4): 71.2 %
BH CV ECHO MEAS - ESV(CUBED): 35.9 ML
BH CV ECHO MEAS - ESV(MOD-SP2): 21.2 ML
BH CV ECHO MEAS - ESV(MOD-SP4): 26.5 ML
BH CV ECHO MEAS - FS: 34.5 %
BH CV ECHO MEAS - IVS/LVPW: 0.96 CM
BH CV ECHO MEAS - IVSD: 0.96 CM
BH CV ECHO MEAS - LA DIMENSION: 4 CM
BH CV ECHO MEAS - LAT PEAK E' VEL: 8.2 CM/SEC
BH CV ECHO MEAS - LV DIASTOLIC VOL/BSA (35-75): 43.2 CM2
BH CV ECHO MEAS - LV MASS(C)D: 178.8 GRAMS
BH CV ECHO MEAS - LV MAX PG: 2.5 MMHG
BH CV ECHO MEAS - LV MEAN PG: 1.51 MMHG
BH CV ECHO MEAS - LV SYSTOLIC VOL/BSA (12-30): 12.4 CM2
BH CV ECHO MEAS - LV V1 MAX: 79 CM/SEC
BH CV ECHO MEAS - LV V1 VTI: 17.9 CM
BH CV ECHO MEAS - LVIDD: 5 CM
BH CV ECHO MEAS - LVIDS: 3.3 CM
BH CV ECHO MEAS - LVOT AREA: 3.3 CM2
BH CV ECHO MEAS - LVOT DIAM: 2.04 CM
BH CV ECHO MEAS - LVPWD: 1 CM
BH CV ECHO MEAS - MED PEAK E' VEL: 8.2 CM/SEC
BH CV ECHO MEAS - MR MAX PG: 34.4 MMHG
BH CV ECHO MEAS - MR MAX VEL: 293.2 CM/SEC
BH CV ECHO MEAS - MV A MAX VEL: 53.1 CM/SEC
BH CV ECHO MEAS - MV DEC SLOPE: 317.4 CM/SEC2
BH CV ECHO MEAS - MV DEC TIME: 0.23 SEC
BH CV ECHO MEAS - MV E MAX VEL: 71.9 CM/SEC
BH CV ECHO MEAS - MV E/A: 1.35
BH CV ECHO MEAS - MV MAX PG: 2.17 MMHG
BH CV ECHO MEAS - MV MEAN PG: 1.09 MMHG
BH CV ECHO MEAS - MV V2 VTI: 28.8 CM
BH CV ECHO MEAS - MVA(VTI): 2.02 CM2
BH CV ECHO MEAS - PA ACC TIME: 0.1 SEC
BH CV ECHO MEAS - PA V2 MAX: 136.9 CM/SEC
BH CV ECHO MEAS - PI END-D VEL: 92.9 CM/SEC
BH CV ECHO MEAS - RAP SYSTOLE: 3 MMHG
BH CV ECHO MEAS - RV MAX PG: 2.6 MMHG
BH CV ECHO MEAS - RV V1 MAX: 80.8 CM/SEC
BH CV ECHO MEAS - RV V1 VTI: 19.1 CM
BH CV ECHO MEAS - RVDD: 3.2 CM
BH CV ECHO MEAS - RVSP: 21.4 MMHG
BH CV ECHO MEAS - SV(LVOT): 58.3 ML
BH CV ECHO MEAS - SV(MOD-SP2): 39.3 ML
BH CV ECHO MEAS - SV(MOD-SP4): 65.6 ML
BH CV ECHO MEAS - SVI(LVOT): 27.4 ML/M2
BH CV ECHO MEAS - SVI(MOD-SP2): 18.4 ML/M2
BH CV ECHO MEAS - SVI(MOD-SP4): 30.8 ML/M2
BH CV ECHO MEAS - TAPSE (>1.6): 1.98 CM
BH CV ECHO MEAS - TR MAX PG: 18.4 MMHG
BH CV ECHO MEAS - TR MAX VEL: 214.2 CM/SEC
BH CV ECHO MEASUREMENTS AVERAGE E/E' RATIO: 8.77
LV EF BIPLANE MOD: 70 %

## 2025-07-22 PROCEDURE — 93306 TTE W/DOPPLER COMPLETE: CPT | Performed by: INTERNAL MEDICINE

## 2025-07-22 PROCEDURE — 1160F RVW MEDS BY RX/DR IN RCRD: CPT | Performed by: INTERNAL MEDICINE

## 2025-07-22 PROCEDURE — 93306 TTE W/DOPPLER COMPLETE: CPT

## 2025-07-22 PROCEDURE — 99213 OFFICE O/P EST LOW 20 MIN: CPT | Performed by: INTERNAL MEDICINE

## 2025-07-22 PROCEDURE — 3074F SYST BP LT 130 MM HG: CPT | Performed by: INTERNAL MEDICINE

## 2025-07-22 PROCEDURE — 3078F DIAST BP <80 MM HG: CPT | Performed by: INTERNAL MEDICINE

## 2025-07-22 PROCEDURE — 93356 MYOCRD STRAIN IMG SPCKL TRCK: CPT | Performed by: INTERNAL MEDICINE

## 2025-07-22 PROCEDURE — 93356 MYOCRD STRAIN IMG SPCKL TRCK: CPT

## 2025-07-22 PROCEDURE — 1159F MED LIST DOCD IN RCRD: CPT | Performed by: INTERNAL MEDICINE

## 2025-07-22 NOTE — PROGRESS NOTES
Progress note      Name: Floyd Herrera ADMIT: (Not on file)   : 1945  PCP: Tammy Rodas MD    MRN: [id number] LOS: 0 days   AGE/SEX: 79 y.o. male  ROOM: Room/bed info not found     Chief Complaint   Patient presents with    Follow-up     Device check and echo f/u        Subjective       History of present illness  Floyd Herrera is a 79-year-old male patient who has complete heart block, dual-chamber pacemaker with generator change out in 2021, prostate cancer, diabetes, here today for follow-up.   Patient here lately has been complaining of worsening shortness of breath and also some chest discomfort.  His symptoms are worse with exertion.    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 Jones    Shortness of breath     Skin cancer     Sleep apnea [id number]    Cpap 11 retested  no changes    Thyroid disease      Past Surgical History:   Procedure Laterality Date    BACK SURGERY      CARDIAC CATHETERIZATION      CARDIAC ELECTROPHYSIOLOGY PROCEDURE N/A 2021    Procedure: PPM generator change - dual;  Surgeon: Manuel Camacho MD;  Location: [facility] CATH INVASIVE LOC[facility];  Service: Cardiovascular;  Laterality: N/A;    CARDIOVASCULAR STRESS TEST      Medications induced sress test    CARPAL TUNNEL RELEASE      CATARACT EXTRACTION      COLONOSCOPY  2017    GALLBLADDER SURGERY      HERNIA REPAIR      KNEE SURGERY      PACEMAKER REPLACEMENT       Family History   Problem Relation Age of Onset    Stroke Mother     Heart attack Sister     Cancer Sister         colon    Stroke Sister     Cancer Brother         lung    Alcohol abuse Brother     No Known Problems Father     No Known Problems Maternal Aunt     No Known Problems Maternal Uncle     No Known Problems  Paternal Aunt     No Known Problems Paternal Uncle     No Known Problems Maternal Grandmother     No Known Problems Maternal Grandfather     No Known Problems Paternal Grandmother     No Known Problems Paternal Grandfather     No Known Problems Other     Anemia Neg Hx     Arrhythmia Neg Hx     Asthma Neg Hx     Clotting disorder Neg Hx     Fainting Neg Hx     Heart disease Neg Hx     Heart failure Neg Hx     Hyperlipidemia Neg Hx     Hypertension Neg Hx      Social History     Tobacco Use    Smoking status: Former     Types: Pipe     Passive exposure: Past    Smokeless tobacco: Never   Vaping Use    Vaping status: Never Used   Substance Use Topics    Alcohol use: Yes     Comment: Socially    Drug use: Never       Current Outpatient Medications:     ACCU-CHEK FASTCLIX LANCETS misc, Use to check blood sugar twice daily, Disp: , Rfl:     alfuzosin (UROXATRAL) 10 MG 24 hr tablet, TAKE 1 TABLET EVERY DAY, Disp: 90 tablet, Rfl: 3    amoxicillin-clavulanate (AUGMENTIN) 875-125 MG per tablet, Take 1 tablet by mouth 2 (Two) Times a Day., Disp: 20 tablet, Rfl: 0    aspirin 81 MG tablet, ASPIRIN 81 MG ORAL TABLET, Disp: , Rfl:     atorvastatin (LIPITOR) 40 MG tablet, TAKE ONE TABLET BY MOUTH ONCE DAILY, Disp: 90 tablet, Rfl: 3    cetirizine (zyrTEC) 10 MG tablet, , Disp: , Rfl:     Continuous Blood Gluc  (FreeStyle Hermelinda 2 [place]) device, Use 1 Device Daily., Disp: 1 each, Rfl: 0    Continuous Blood Gluc Sensor (FreeStyle Hermelinda 2 Sensor) misc, Use 1 Device Daily., Disp: 2 each, Rfl: 6    fluticasone (FLONASE) 50 MCG/ACT nasal spray, As Needed., Disp: , Rfl:     glucose blood test strip, Use to check blood sugar twice daily, Disp: , Rfl:     Insulin Glargine (LANTUS SOLOSTAR) 100 UNIT/ML injection pen, Inject 15 units subcu daily, Disp: 45 mL, Rfl: 3    Insulin Lispro, 1 Unit Dial, (HumaLOG KwikPen) 100 UNIT/ML solution pen-injector, Inject 5 units before each meal 3 times a day., Disp: 45 mL, Rfl: 3    Insulin Pen  Needle 31G X 8 MM misc, BD PEN NEEDLE SHORT U/F 31G X 8 MM, Disp: , Rfl:     levothyroxine (SYNTHROID, LEVOTHROID) 25 MCG tablet, Take 1 tablet by mouth Daily. (Patient taking differently: Take 2 tablets by mouth Daily.), Disp: 90 tablet, Rfl: 3    metFORMIN (GLUCOPHAGE) 500 MG tablet, Take 1 tablet by mouth Daily With Breakfast., Disp: , Rfl:     montelukast (SINGULAIR) 10 MG tablet, TAKE ONE TABLET BY MOUTH EVERY DAY, Disp: 90 tablet, Rfl: 2    Multiple Vitamins-Minerals (MULTI VITAMIN/MINERALS) tablet, MULTI VITAMIN/MINERALS TABS, Disp: , Rfl:     pantoprazole (PROTONIX) 40 MG EC tablet, TAKE ONE TABLET BY MOUTH EVERY DAY, Disp: 90 tablet, Rfl: 3  Allergies:  Patient has no known allergies.      Physical Exam  VITALS REVIEWED    General:      well developed, in no acute distress.    Head:      normocephalic and atraumatic.    Eyes:      PERRL/EOM intact, conjunctiva and sclera clear with out nystagmus.    Neck:      no masses, thyromegaly,  trachea central with normal respiratory effort and PMI displaced laterally  Lungs:      Clear to auscultation bilaterally  Heart:       Rate and rhythm, systolic murmur  Msk:      no deformity or scoliosis noted of thoracic or lumbar spine.    Pulses:      pulses normal in all 4 extremities.    Extremities:       No lower extremity edema  Neurologic:      no focal deficits.   alert oriented x3  Skin:      intact without lesions or rashes.    Psych:      alert and cooperative; normal mood and affect; normal attention span and concentration.      Result Review :               Pertinent cardiac workup    Echo 7/22/2025 ejection fraction 60 to 65%, moderate aortic stenosis.      Procedures        Assessment and Plan      Floyd Herrera is a 79-year-old male patient, complete heart block, dual-chamber pacemaker, here for follow-up.  Patient does not have prior history of coronary artery disease, echo shows normal EF despite being RV paced 100%.  He does have aortic stenosis  but moderate at the most.  Patient is complaining of worsening shortness of breath along with some chest discomfort mostly evident with exertion.  I think ischemic workup is warranted specially that he has diabetes.  Will order stress test to start.    Diagnoses and all orders for this visit:    1. Angina at rest (Primary)  -     Stress Test With Myocardial Perfusion One Day; Future    2. AV block, complete    3. Presence of cardiac pacemaker           Return after stress.  Patient was given instructions and counseling regarding his condition or for health maintenance advice. Please see specific information pulled into the AVS if appropriate.     Electronically signed by Julian Quezada MD, 07/22/25, 12:05 PM EDT.

## 2025-08-05 ENCOUNTER — HOSPITAL ENCOUNTER (OUTPATIENT)
Dept: NUCLEAR MEDICINE | Facility: HOSPITAL | Age: 80
Discharge: HOME | End: 2025-08-05
Payer: MEDICARE

## 2025-08-05 DIAGNOSIS — I20.89 ANGINA AT REST: ICD-10-CM

## 2025-08-05 LAB
BH CV NUCLEAR PRIOR STUDY: 3
BH CV REST NUCLEAR ISOTOPE DOSE: 11 MCI
BH CV STRESS BP STAGE 1: NORMAL
BH CV STRESS BP STAGE 2: NORMAL
BH CV STRESS BP STAGE 3: NORMAL
BH CV STRESS COMMENTS STAGE 1: NORMAL
BH CV STRESS COMMENTS STAGE 2: NORMAL
BH CV STRESS DOSE REGADENOSON STAGE 1: 0.4
BH CV STRESS DURATION MIN STAGE 1: 0
BH CV STRESS DURATION MIN STAGE 2: 4
BH CV STRESS DURATION SEC STAGE 1: 10
BH CV STRESS DURATION SEC STAGE 2: 0
BH CV STRESS HR STAGE 1: 61
BH CV STRESS HR STAGE 2: 64
BH CV STRESS HR STAGE 3: 61
BH CV STRESS NUCLEAR ISOTOPE DOSE: 33 MCI
BH CV STRESS PROTOCOL 1: NORMAL
BH CV STRESS RECOVERY BP: NORMAL MMHG
BH CV STRESS RECOVERY HR: 63 BPM
BH CV STRESS STAGE 1: 1
BH CV STRESS STAGE 2: 2
BH CV STRESS STAGE 3: 3
MAXIMAL PREDICTED HEART RATE: 143 BPM
PERCENT MAX PREDICTED HR: 47.55 %
SPECT HRT GATED+EF W RNC IV: 54 %
STRESS BASELINE BP: NORMAL MMHG
STRESS BASELINE HR: 68 BPM
STRESS PERCENT HR: 56 %
STRESS POST PEAK BP: NORMAL MMHG
STRESS POST PEAK HR: 68 BPM
STRESS TARGET HR: 122 BPM

## 2025-08-05 PROCEDURE — 78452 HT MUSCLE IMAGE SPECT MULT: CPT

## 2025-08-05 PROCEDURE — 25010000002 REGADENOSON 0.4 MG/5ML SOLUTION: Performed by: INTERNAL MEDICINE

## 2025-08-05 PROCEDURE — 34310000005 TECHNETIUM TETROFOSMIN KIT: Performed by: INTERNAL MEDICINE

## 2025-08-05 PROCEDURE — A9502 TC99M TETROFOSMIN: HCPCS | Performed by: INTERNAL MEDICINE

## 2025-08-05 PROCEDURE — 93017 CV STRESS TEST TRACING ONLY: CPT

## 2025-08-05 RX ORDER — REGADENOSON 0.08 MG/ML
0.4 INJECTION, SOLUTION INTRAVENOUS
Status: COMPLETED | OUTPATIENT
Start: 2025-08-05 | End: 2025-08-05

## 2025-08-05 RX ADMIN — TETROFOSMIN 1 DOSE: 1.38 INJECTION, POWDER, LYOPHILIZED, FOR SOLUTION INTRAVENOUS at 13:09

## 2025-08-05 RX ADMIN — REGADENOSON 0.4 MG: 0.08 INJECTION, SOLUTION INTRAVENOUS at 13:09

## 2025-08-05 RX ADMIN — TETROFOSMIN 1 DOSE: 1.38 INJECTION, POWDER, LYOPHILIZED, FOR SOLUTION INTRAVENOUS at 12:23

## 2025-08-06 ENCOUNTER — RESULTS FOLLOW-UP (OUTPATIENT)
Dept: CARDIOLOGY | Facility: CLINIC | Age: 80
End: 2025-08-06
Payer: MEDICARE

## 2025-08-06 DIAGNOSIS — R94.39 ABNORMAL NUCLEAR STRESS TEST: Primary | ICD-10-CM

## 2025-08-06 DIAGNOSIS — I20.0 UNSTABLE ANGINA: ICD-10-CM

## 2025-08-08 ENCOUNTER — LAB (OUTPATIENT)
Dept: LAB | Facility: HOSPITAL | Age: 80
End: 2025-08-08
Payer: MEDICARE

## 2025-08-08 DIAGNOSIS — I20.0 UNSTABLE ANGINA: ICD-10-CM

## 2025-08-08 DIAGNOSIS — R94.39 ABNORMAL NUCLEAR STRESS TEST: ICD-10-CM

## 2025-08-08 LAB
ANION GAP SERPL CALCULATED.3IONS-SCNC: 9.9 MMOL/L (ref 5–15)
BUN SERPL-MCNC: 17 MG/DL (ref 8–23)
BUN/CREAT SERPL: 14 (ref 7–25)
CALCIUM SPEC-SCNC: 9.5 MG/DL (ref 8.6–10.5)
CHLORIDE SERPL-SCNC: 105 MMOL/L (ref 98–107)
CO2 SERPL-SCNC: 25.1 MMOL/L (ref 22–29)
CREAT SERPL-MCNC: 1.21 MG/DL (ref 0.76–1.27)
DEPRECATED RDW RBC AUTO: 41.8 FL (ref 37–54)
EGFRCR SERPLBLD CKD-EPI 2021: 61.7 ML/MIN/1.73
ERYTHROCYTE [DISTWIDTH] IN BLOOD BY AUTOMATED COUNT: 12.7 % (ref 12.3–15.4)
GLUCOSE SERPL-MCNC: 206 MG/DL (ref 65–99)
HCT VFR BLD AUTO: 42.6 % (ref 37.5–51)
HGB BLD-MCNC: 14 G/DL (ref 13–17.7)
MCH RBC QN AUTO: 29.9 PG (ref 26.6–33)
MCHC RBC AUTO-ENTMCNC: 32.9 G/DL (ref 31.5–35.7)
MCV RBC AUTO: 90.8 FL (ref 79–97)
PLATELET # BLD AUTO: 170 10*3/MM3 (ref 140–450)
PMV BLD AUTO: 10.2 FL (ref 6–12)
POTASSIUM SERPL-SCNC: 4.4 MMOL/L (ref 3.5–5.2)
RBC # BLD AUTO: 4.69 10*6/MM3 (ref 4.14–5.8)
SODIUM SERPL-SCNC: 140 MMOL/L (ref 136–145)
WBC NRBC COR # BLD AUTO: 5.01 10*3/MM3 (ref 3.4–10.8)

## 2025-08-08 PROCEDURE — 36415 COLL VENOUS BLD VENIPUNCTURE: CPT

## 2025-08-08 PROCEDURE — 80048 BASIC METABOLIC PNL TOTAL CA: CPT

## 2025-08-08 PROCEDURE — 85027 COMPLETE CBC AUTOMATED: CPT

## 2025-08-08 RX ORDER — FEXOFENADINE HCL 180 MG/1
180 TABLET ORAL NIGHTLY
COMMUNITY

## 2025-08-11 ENCOUNTER — HOSPITAL ENCOUNTER (OUTPATIENT)
Facility: HOSPITAL | Age: 80
Setting detail: HOSPITAL OUTPATIENT SURGERY
Discharge: HOME | End: 2025-08-11
Attending: INTERNAL MEDICINE | Admitting: INTERNAL MEDICINE
Payer: MEDICARE

## 2025-08-11 VITALS
OXYGEN SATURATION: 97 % | RESPIRATION RATE: 10 BRPM | TEMPERATURE: 97.9 F | DIASTOLIC BLOOD PRESSURE: 68 MMHG | HEIGHT: 72 IN | SYSTOLIC BLOOD PRESSURE: 127 MMHG | HEART RATE: 60 BPM | BODY MASS INDEX: 27.26 KG/M2 | WEIGHT: 201.28 LBS

## 2025-08-11 DIAGNOSIS — R94.39 ABNORMAL NUCLEAR STRESS TEST: ICD-10-CM

## 2025-08-11 DIAGNOSIS — I20.0 UNSTABLE ANGINA: ICD-10-CM

## 2025-08-11 LAB — GLUCOSE BLDC GLUCOMTR-MCNC: 133 MG/DL (ref 70–105)

## 2025-08-11 PROCEDURE — 25810000003 SODIUM CHLORIDE 0.9 % SOLUTION: Performed by: INTERNAL MEDICINE

## 2025-08-11 PROCEDURE — 82948 REAGENT STRIP/BLOOD GLUCOSE: CPT

## 2025-08-11 PROCEDURE — 25010000002 FENTANYL CITRATE (PF) 100 MCG/2ML SOLUTION: Performed by: INTERNAL MEDICINE

## 2025-08-11 PROCEDURE — C1894 INTRO/SHEATH, NON-LASER: HCPCS | Performed by: INTERNAL MEDICINE

## 2025-08-11 PROCEDURE — 93458 L HRT ARTERY/VENTRICLE ANGIO: CPT | Performed by: INTERNAL MEDICINE

## 2025-08-11 PROCEDURE — 25010000002 MIDAZOLAM PER 1 MG: Performed by: INTERNAL MEDICINE

## 2025-08-11 PROCEDURE — 99152 MOD SED SAME PHYS/QHP 5/>YRS: CPT | Performed by: INTERNAL MEDICINE

## 2025-08-11 PROCEDURE — 25010000002 LIDOCAINE 2% SOLUTION: Performed by: INTERNAL MEDICINE

## 2025-08-11 PROCEDURE — 25010000002 NITROGLYCERIN 5 MG/ML SOLUTION: Performed by: INTERNAL MEDICINE

## 2025-08-11 PROCEDURE — 25510000001 IOPAMIDOL PER 1 ML: Performed by: INTERNAL MEDICINE

## 2025-08-11 PROCEDURE — 25010000002 NICARDIPINE 2.5 MG/ML SOLUTION: Performed by: INTERNAL MEDICINE

## 2025-08-11 PROCEDURE — C1769 GUIDE WIRE: HCPCS | Performed by: INTERNAL MEDICINE

## 2025-08-11 PROCEDURE — 25010000002 HEPARIN (PORCINE) PER 1000 UNITS: Performed by: INTERNAL MEDICINE

## 2025-08-11 RX ORDER — NICARDIPINE HYDROCHLORIDE 2.5 MG/ML
INJECTION INTRAVENOUS
Status: DISCONTINUED | OUTPATIENT
Start: 2025-08-11 | End: 2025-08-11 | Stop reason: HOSPADM

## 2025-08-11 RX ORDER — FENTANYL CITRATE 50 UG/ML
INJECTION, SOLUTION INTRAMUSCULAR; INTRAVENOUS
Status: DISCONTINUED | OUTPATIENT
Start: 2025-08-11 | End: 2025-08-11 | Stop reason: HOSPADM

## 2025-08-11 RX ORDER — IOPAMIDOL 755 MG/ML
INJECTION, SOLUTION INTRAVASCULAR
Status: DISCONTINUED | OUTPATIENT
Start: 2025-08-11 | End: 2025-08-11 | Stop reason: HOSPADM

## 2025-08-11 RX ORDER — HEPARIN SODIUM 1000 [USP'U]/ML
INJECTION, SOLUTION INTRAVENOUS; SUBCUTANEOUS
Status: DISCONTINUED | OUTPATIENT
Start: 2025-08-11 | End: 2025-08-11 | Stop reason: HOSPADM

## 2025-08-11 RX ORDER — MIDAZOLAM HYDROCHLORIDE 1 MG/ML
INJECTION, SOLUTION INTRAMUSCULAR; INTRAVENOUS
Status: DISCONTINUED | OUTPATIENT
Start: 2025-08-11 | End: 2025-08-11 | Stop reason: HOSPADM

## 2025-08-11 RX ORDER — NITROGLYCERIN 0.4 MG/1
0.4 TABLET SUBLINGUAL
Status: DISCONTINUED | OUTPATIENT
Start: 2025-08-11 | End: 2025-08-11 | Stop reason: HOSPADM

## 2025-08-11 RX ORDER — LEVOTHYROXINE SODIUM 50 UG/1
50 TABLET ORAL
COMMUNITY

## 2025-08-11 RX ORDER — MONTELUKAST SODIUM 10 MG/1
10 TABLET ORAL NIGHTLY
COMMUNITY

## 2025-08-11 RX ORDER — ONDANSETRON 4 MG/1
4 TABLET, ORALLY DISINTEGRATING ORAL EVERY 6 HOURS PRN
Status: DISCONTINUED | OUTPATIENT
Start: 2025-08-11 | End: 2025-08-11 | Stop reason: HOSPADM

## 2025-08-11 RX ORDER — ACETAMINOPHEN 325 MG/1
650 TABLET ORAL EVERY 4 HOURS PRN
Status: DISCONTINUED | OUTPATIENT
Start: 2025-08-11 | End: 2025-08-11 | Stop reason: HOSPADM

## 2025-08-11 RX ORDER — ONDANSETRON 2 MG/ML
4 INJECTION INTRAMUSCULAR; INTRAVENOUS EVERY 6 HOURS PRN
Status: DISCONTINUED | OUTPATIENT
Start: 2025-08-11 | End: 2025-08-11 | Stop reason: HOSPADM

## 2025-08-11 RX ORDER — DIPHENHYDRAMINE HCL 25 MG
25 CAPSULE ORAL EVERY 6 HOURS PRN
Status: DISCONTINUED | OUTPATIENT
Start: 2025-08-11 | End: 2025-08-11 | Stop reason: HOSPADM

## 2025-08-11 RX ORDER — NITROGLYCERIN 5 MG/ML
INJECTION, SOLUTION INTRAVENOUS
Status: DISCONTINUED | OUTPATIENT
Start: 2025-08-11 | End: 2025-08-11 | Stop reason: HOSPADM

## 2025-08-11 RX ORDER — LIDOCAINE HYDROCHLORIDE 20 MG/ML
INJECTION, SOLUTION INFILTRATION; PERINEURAL
Status: DISCONTINUED | OUTPATIENT
Start: 2025-08-11 | End: 2025-08-11 | Stop reason: HOSPADM

## (undated) DEVICE — UNDYED BRAIDED (POLYGLACTIN 910), SYNTHETIC ABSORBABLE SUTURE: Brand: COATED VICRYL

## (undated) DEVICE — 3M™ PATIENT PLATE, CORDED, SPLIT, LARGE, 40 PER CASE, 1179: Brand: 3M™

## (undated) DEVICE — ADHS LIQ MASTISOL 2/3ML

## (undated) DEVICE — VIOLET BRAIDED (POLYGLACTIN 910), SYNTHETIC ABSORBABLE SUTURE: Brand: COATED VICRYL

## (undated) DEVICE — CABL BIPOL W/ALLGTR CLIP/SM 12FT

## (undated) DEVICE — CATH F6 INF TL JL 3.5 100 CM: Brand: INFINITI

## (undated) DEVICE — 3M™ STERI-STRIP™ REINFORCED ADHESIVE SKIN CLOSURES, R1547, 1/2 IN X 4 IN (12 MM X 100 MM), 6 STRIPS/ENVELOPE: Brand: 3M™ STERI-STRIP™

## (undated) DEVICE — SUT SILK 2/0 FS BLK 18IN 685G

## (undated) DEVICE — ELECTRD DEFIB M/FUNC PROPADZ RADIOL 2PK

## (undated) DEVICE — PLASMABLADE PS200-040 4.0: Brand: PLASMABLADE™

## (undated) DEVICE — DGW .035 FC J3MM 260CM TEF: Brand: EMERALD

## (undated) DEVICE — PACEMAKER CDS: Brand: MEDLINE INDUSTRIES, INC.

## (undated) DEVICE — GLIDESHEATH SLENDER ACCESS KIT: Brand: GLIDESHEATH SLENDER

## (undated) DEVICE — ST ACC MICROPUNCTURE STFF/CANN PLAT/TP 4F 21G 40CM

## (undated) DEVICE — WRENCH KT PM MEDTRONIC

## (undated) DEVICE — PK TRY HEART CATH 50

## (undated) DEVICE — CATH F6INF TL JR 4 100CM: Brand: INFINITI